# Patient Record
Sex: FEMALE | Race: WHITE | NOT HISPANIC OR LATINO | Employment: OTHER | URBAN - METROPOLITAN AREA
[De-identification: names, ages, dates, MRNs, and addresses within clinical notes are randomized per-mention and may not be internally consistent; named-entity substitution may affect disease eponyms.]

---

## 2017-01-03 ENCOUNTER — HISTORICAL (OUTPATIENT)
Dept: CARDIOLOGY | Facility: HOSPITAL | Age: 72
End: 2017-01-03

## 2017-01-17 ENCOUNTER — HISTORICAL (OUTPATIENT)
Dept: CARDIOLOGY | Facility: HOSPITAL | Age: 72
End: 2017-01-17

## 2017-01-27 ENCOUNTER — HISTORICAL (OUTPATIENT)
Dept: CARDIOLOGY | Facility: HOSPITAL | Age: 72
End: 2017-01-27

## 2017-01-30 ENCOUNTER — HISTORICAL (OUTPATIENT)
Dept: CARDIOLOGY | Facility: HOSPITAL | Age: 72
End: 2017-01-30

## 2017-02-08 ENCOUNTER — HISTORICAL (OUTPATIENT)
Dept: CARDIOLOGY | Facility: HOSPITAL | Age: 72
End: 2017-02-08

## 2017-02-14 ENCOUNTER — HISTORICAL (OUTPATIENT)
Dept: CARDIOLOGY | Facility: HOSPITAL | Age: 72
End: 2017-02-14

## 2017-02-21 ENCOUNTER — HISTORICAL (OUTPATIENT)
Dept: CARDIOLOGY | Facility: HOSPITAL | Age: 72
End: 2017-02-21

## 2017-03-08 ENCOUNTER — HISTORICAL (OUTPATIENT)
Dept: CARDIOLOGY | Facility: HOSPITAL | Age: 72
End: 2017-03-08

## 2017-03-21 ENCOUNTER — HISTORICAL (OUTPATIENT)
Dept: ADMINISTRATIVE | Facility: HOSPITAL | Age: 72
End: 2017-03-21

## 2017-04-05 ENCOUNTER — HISTORICAL (OUTPATIENT)
Dept: CARDIOLOGY | Facility: HOSPITAL | Age: 72
End: 2017-04-05

## 2017-05-08 ENCOUNTER — HISTORICAL (OUTPATIENT)
Dept: CARDIOLOGY | Facility: HOSPITAL | Age: 72
End: 2017-05-08

## 2017-05-11 ENCOUNTER — HISTORICAL (OUTPATIENT)
Dept: CARDIOLOGY | Facility: HOSPITAL | Age: 72
End: 2017-05-11

## 2017-05-17 ENCOUNTER — HISTORICAL (OUTPATIENT)
Dept: CARDIOLOGY | Facility: HOSPITAL | Age: 72
End: 2017-05-17

## 2017-06-14 ENCOUNTER — HISTORICAL (OUTPATIENT)
Dept: CARDIOLOGY | Facility: HOSPITAL | Age: 72
End: 2017-06-14

## 2017-06-23 ENCOUNTER — HISTORICAL (OUTPATIENT)
Dept: ADMINISTRATIVE | Facility: HOSPITAL | Age: 72
End: 2017-06-23

## 2017-06-23 LAB
FLUAV AG NPH QL IA: NEGATIVE
FLUBV AG NPH QL IA: NEGATIVE

## 2017-06-28 ENCOUNTER — HISTORICAL (OUTPATIENT)
Dept: CARDIOLOGY | Facility: HOSPITAL | Age: 72
End: 2017-06-28

## 2017-07-12 ENCOUNTER — HISTORICAL (OUTPATIENT)
Dept: CARDIOLOGY | Facility: HOSPITAL | Age: 72
End: 2017-07-12

## 2017-08-02 ENCOUNTER — HISTORICAL (OUTPATIENT)
Dept: CARDIOLOGY | Facility: HOSPITAL | Age: 72
End: 2017-08-02

## 2017-08-28 ENCOUNTER — HISTORICAL (OUTPATIENT)
Dept: CARDIOLOGY | Facility: HOSPITAL | Age: 72
End: 2017-08-28

## 2017-09-20 ENCOUNTER — HISTORICAL (OUTPATIENT)
Dept: ADMINISTRATIVE | Facility: HOSPITAL | Age: 72
End: 2017-09-20

## 2017-09-20 LAB
ABS NEUT (OLG): 3.56 X10(3)/MCL (ref 2.1–9.2)
ALBUMIN SERPL-MCNC: 3.9 GM/DL (ref 3.4–5)
ALBUMIN/GLOB SERPL: 1.3 {RATIO}
ALP SERPL-CCNC: 56 UNIT/L (ref 38–126)
ALT SERPL-CCNC: 24 UNIT/L (ref 12–78)
AST SERPL-CCNC: 17 UNIT/L (ref 15–37)
BASOPHILS # BLD AUTO: 0.1 X10(3)/MCL (ref 0–0.2)
BASOPHILS NFR BLD AUTO: 1 %
BILIRUB SERPL-MCNC: 0.7 MG/DL (ref 0.2–1)
BILIRUBIN DIRECT+TOT PNL SERPL-MCNC: 0.2 MG/DL (ref 0–0.2)
BILIRUBIN DIRECT+TOT PNL SERPL-MCNC: 0.5 MG/DL (ref 0–0.8)
BUN SERPL-MCNC: 19 MG/DL (ref 7–18)
CALCIUM SERPL-MCNC: 8.9 MG/DL (ref 8.5–10.1)
CHLORIDE SERPL-SCNC: 103 MMOL/L (ref 98–107)
CHOLEST SERPL-MCNC: 187 MG/DL (ref 0–200)
CHOLEST/HDLC SERPL: 2.8 {RATIO} (ref 0–4)
CO2 SERPL-SCNC: 28 MMOL/L (ref 21–32)
CREAT SERPL-MCNC: 0.7 MG/DL (ref 0.55–1.02)
EOSINOPHIL # BLD AUTO: 0.3 X10(3)/MCL (ref 0–0.9)
EOSINOPHIL NFR BLD AUTO: 5 %
ERYTHROCYTE [DISTWIDTH] IN BLOOD BY AUTOMATED COUNT: 13.1 % (ref 11.5–17)
GLOBULIN SER-MCNC: 3.1 GM/DL (ref 2.4–3.5)
GLUCOSE SERPL-MCNC: 96 MG/DL (ref 74–106)
HCT VFR BLD AUTO: 38.7 % (ref 37–47)
HDLC SERPL-MCNC: 67 MG/DL (ref 35–60)
HGB BLD-MCNC: 13.5 GM/DL (ref 12–16)
LDLC SERPL CALC-MCNC: 100 MG/DL (ref 0–129)
LYMPHOCYTES # BLD AUTO: 1.4 X10(3)/MCL (ref 0.6–4.6)
LYMPHOCYTES NFR BLD AUTO: 24 %
MCH RBC QN AUTO: 32.5 PG (ref 27–31)
MCHC RBC AUTO-ENTMCNC: 34.9 GM/DL (ref 33–36)
MCV RBC AUTO: 93 FL (ref 80–94)
MONOCYTES # BLD AUTO: 0.5 X10(3)/MCL (ref 0.1–1.3)
MONOCYTES NFR BLD AUTO: 9 %
NEUTROPHILS # BLD AUTO: 3.56 X10(3)/MCL (ref 1.4–7.9)
NEUTROPHILS NFR BLD AUTO: 60 %
PLATELET # BLD AUTO: 174 X10(3)/MCL (ref 130–400)
PMV BLD AUTO: 9.8 FL (ref 9.4–12.4)
POTASSIUM SERPL-SCNC: 4.3 MMOL/L (ref 3.5–5.1)
PROT SERPL-MCNC: 7 GM/DL (ref 6.4–8.2)
RBC # BLD AUTO: 4.16 X10(6)/MCL (ref 4.2–5.4)
SODIUM SERPL-SCNC: 138 MMOL/L (ref 136–145)
TRIGL SERPL-MCNC: 99 MG/DL (ref 30–150)
TSH SERPL-ACNC: 2.53 MIU/ML (ref 0.36–3.74)
VLDLC SERPL CALC-MCNC: 20 MG/DL
WBC # SPEC AUTO: 5.9 X10(3)/MCL (ref 4.5–11.5)

## 2017-09-26 ENCOUNTER — HISTORICAL (OUTPATIENT)
Dept: CARDIOLOGY | Facility: HOSPITAL | Age: 72
End: 2017-09-26

## 2017-09-29 ENCOUNTER — HISTORICAL (OUTPATIENT)
Dept: CARDIOLOGY | Facility: HOSPITAL | Age: 72
End: 2017-09-29

## 2017-10-05 ENCOUNTER — HISTORICAL (OUTPATIENT)
Dept: CARDIOLOGY | Facility: HOSPITAL | Age: 72
End: 2017-10-05

## 2017-10-18 ENCOUNTER — HISTORICAL (OUTPATIENT)
Dept: CARDIOLOGY | Facility: HOSPITAL | Age: 72
End: 2017-10-18

## 2017-11-15 ENCOUNTER — HISTORICAL (OUTPATIENT)
Dept: CARDIOLOGY | Facility: HOSPITAL | Age: 72
End: 2017-11-15

## 2017-11-29 ENCOUNTER — HISTORICAL (OUTPATIENT)
Dept: CARDIOLOGY | Facility: HOSPITAL | Age: 72
End: 2017-11-29

## 2017-12-14 ENCOUNTER — HISTORICAL (OUTPATIENT)
Dept: CARDIOLOGY | Facility: HOSPITAL | Age: 72
End: 2017-12-14

## 2017-12-19 ENCOUNTER — HISTORICAL (OUTPATIENT)
Dept: CARDIOLOGY | Facility: HOSPITAL | Age: 72
End: 2017-12-19

## 2018-01-10 ENCOUNTER — HISTORICAL (OUTPATIENT)
Dept: CARDIOLOGY | Facility: HOSPITAL | Age: 73
End: 2018-01-10

## 2018-01-30 ENCOUNTER — HISTORICAL (OUTPATIENT)
Dept: CARDIOLOGY | Facility: HOSPITAL | Age: 73
End: 2018-01-30

## 2018-02-06 ENCOUNTER — HISTORICAL (OUTPATIENT)
Dept: CARDIOLOGY | Facility: HOSPITAL | Age: 73
End: 2018-02-06

## 2018-02-15 ENCOUNTER — HISTORICAL (OUTPATIENT)
Dept: CARDIOLOGY | Facility: HOSPITAL | Age: 73
End: 2018-02-15

## 2018-02-19 ENCOUNTER — HISTORICAL (OUTPATIENT)
Dept: CARDIOLOGY | Facility: HOSPITAL | Age: 73
End: 2018-02-19

## 2018-02-21 ENCOUNTER — HISTORICAL (OUTPATIENT)
Dept: CARDIOLOGY | Facility: HOSPITAL | Age: 73
End: 2018-02-21

## 2018-02-27 ENCOUNTER — HISTORICAL (OUTPATIENT)
Dept: CARDIOLOGY | Facility: HOSPITAL | Age: 73
End: 2018-02-27

## 2018-03-15 ENCOUNTER — HISTORICAL (OUTPATIENT)
Dept: ADMINISTRATIVE | Facility: HOSPITAL | Age: 73
End: 2018-03-15

## 2018-03-15 LAB
ABS NEUT (OLG): 2.93 X10(3)/MCL (ref 2.1–9.2)
ALBUMIN SERPL-MCNC: 3.6 GM/DL (ref 3.4–5)
ALBUMIN/GLOB SERPL: 1.1 RATIO (ref 1.1–2)
ALP SERPL-CCNC: 64 UNIT/L (ref 38–126)
ALT SERPL-CCNC: 25 UNIT/L (ref 12–78)
AST SERPL-CCNC: 28 UNIT/L (ref 15–37)
BASOPHILS # BLD AUTO: 0.1 X10(3)/MCL (ref 0–0.2)
BASOPHILS NFR BLD AUTO: 2 %
BILIRUB SERPL-MCNC: 0.6 MG/DL (ref 0.2–1)
BILIRUBIN DIRECT+TOT PNL SERPL-MCNC: 0.1 MG/DL (ref 0–0.5)
BILIRUBIN DIRECT+TOT PNL SERPL-MCNC: 0.5 MG/DL (ref 0–0.8)
BUN SERPL-MCNC: 14 MG/DL (ref 7–18)
CALCIUM SERPL-MCNC: 9.4 MG/DL (ref 8.5–10.1)
CHLORIDE SERPL-SCNC: 103 MMOL/L (ref 98–107)
CHOLEST SERPL-MCNC: 205 MG/DL (ref 0–200)
CHOLEST/HDLC SERPL: 3.4 {RATIO} (ref 0–4)
CO2 SERPL-SCNC: 28 MMOL/L (ref 21–32)
CREAT SERPL-MCNC: 0.76 MG/DL (ref 0.55–1.02)
DEPRECATED CALCIDIOL+CALCIFEROL SERPL-MC: 50.93 NG/ML (ref 30–80)
EOSINOPHIL # BLD AUTO: 0.5 X10(3)/MCL (ref 0–0.9)
EOSINOPHIL NFR BLD AUTO: 9 %
ERYTHROCYTE [DISTWIDTH] IN BLOOD BY AUTOMATED COUNT: 12.6 % (ref 11.5–17)
GLOBULIN SER-MCNC: 3.2 GM/DL (ref 2.4–3.5)
GLUCOSE SERPL-MCNC: 108 MG/DL (ref 74–106)
HCT VFR BLD AUTO: 38 % (ref 37–47)
HDLC SERPL-MCNC: 61 MG/DL (ref 35–60)
HGB BLD-MCNC: 13.4 GM/DL (ref 12–16)
LDLC SERPL CALC-MCNC: 124 MG/DL (ref 0–129)
LYMPHOCYTES # BLD AUTO: 1.3 X10(3)/MCL (ref 0.6–4.6)
LYMPHOCYTES NFR BLD AUTO: 25 %
MCH RBC QN AUTO: 32.1 PG (ref 27–31)
MCHC RBC AUTO-ENTMCNC: 35.3 GM/DL (ref 33–36)
MCV RBC AUTO: 91.1 FL (ref 80–94)
MONOCYTES # BLD AUTO: 0.4 X10(3)/MCL (ref 0.1–1.3)
MONOCYTES NFR BLD AUTO: 8 %
NEUTROPHILS # BLD AUTO: 2.93 X10(3)/MCL (ref 2.1–9.2)
NEUTROPHILS NFR BLD AUTO: 56 %
PLATELET # BLD AUTO: 180 X10(3)/MCL (ref 130–400)
PMV BLD AUTO: 9.4 FL (ref 9.4–12.4)
POTASSIUM SERPL-SCNC: 4 MMOL/L (ref 3.5–5.1)
PROT SERPL-MCNC: 6.8 GM/DL (ref 6.4–8.2)
RBC # BLD AUTO: 4.17 X10(6)/MCL (ref 4.2–5.4)
SODIUM SERPL-SCNC: 138 MMOL/L (ref 136–145)
TRIGL SERPL-MCNC: 98 MG/DL (ref 30–150)
TSH SERPL-ACNC: 2.39 MIU/ML (ref 0.36–3.74)
VLDLC SERPL CALC-MCNC: 20 MG/DL
WBC # SPEC AUTO: 5.3 X10(3)/MCL (ref 4.5–11.5)

## 2018-03-20 ENCOUNTER — HISTORICAL (OUTPATIENT)
Dept: CARDIOLOGY | Facility: HOSPITAL | Age: 73
End: 2018-03-20

## 2018-04-16 ENCOUNTER — HISTORICAL (OUTPATIENT)
Dept: CARDIOLOGY | Facility: HOSPITAL | Age: 73
End: 2018-04-16

## 2018-05-14 ENCOUNTER — HISTORICAL (OUTPATIENT)
Dept: CARDIOLOGY | Facility: HOSPITAL | Age: 73
End: 2018-05-14

## 2018-06-14 ENCOUNTER — HISTORICAL (OUTPATIENT)
Dept: CARDIOLOGY | Facility: HOSPITAL | Age: 73
End: 2018-06-14

## 2018-07-12 ENCOUNTER — HISTORICAL (OUTPATIENT)
Dept: CARDIOLOGY | Facility: HOSPITAL | Age: 73
End: 2018-07-12

## 2018-08-09 ENCOUNTER — HISTORICAL (OUTPATIENT)
Dept: CARDIOLOGY | Facility: HOSPITAL | Age: 73
End: 2018-08-09

## 2018-08-22 ENCOUNTER — HISTORICAL (OUTPATIENT)
Dept: INTENSIVE CARE | Facility: HOSPITAL | Age: 73
End: 2018-08-22

## 2018-09-10 ENCOUNTER — HISTORICAL (OUTPATIENT)
Dept: CARDIOLOGY | Facility: HOSPITAL | Age: 73
End: 2018-09-10

## 2018-09-17 ENCOUNTER — HISTORICAL (OUTPATIENT)
Dept: ADMINISTRATIVE | Facility: HOSPITAL | Age: 73
End: 2018-09-17

## 2018-09-17 LAB
ABS NEUT (OLG): 3.23 X10(3)/MCL (ref 2.1–9.2)
ALBUMIN SERPL-MCNC: 3.3 GM/DL (ref 3.4–5)
ALBUMIN/GLOB SERPL: 1 RATIO (ref 1.1–2)
ALP SERPL-CCNC: 60 UNIT/L (ref 38–126)
ALT SERPL-CCNC: 25 UNIT/L (ref 12–78)
AST SERPL-CCNC: 19 UNIT/L (ref 15–37)
BASOPHILS # BLD AUTO: 0.1 X10(3)/MCL (ref 0–0.2)
BASOPHILS NFR BLD AUTO: 2 %
BILIRUB SERPL-MCNC: 0.5 MG/DL (ref 0.2–1)
BILIRUBIN DIRECT+TOT PNL SERPL-MCNC: 0.1 MG/DL (ref 0–0.5)
BILIRUBIN DIRECT+TOT PNL SERPL-MCNC: 0.4 MG/DL (ref 0–0.8)
BUN SERPL-MCNC: 15 MG/DL (ref 7–18)
CALCIUM SERPL-MCNC: 8.9 MG/DL (ref 8.5–10.1)
CHLORIDE SERPL-SCNC: 105 MMOL/L (ref 98–107)
CHOLEST SERPL-MCNC: 166 MG/DL (ref 0–200)
CHOLEST/HDLC SERPL: 3 {RATIO} (ref 0–4)
CO2 SERPL-SCNC: 29 MMOL/L (ref 21–32)
CREAT SERPL-MCNC: 0.74 MG/DL (ref 0.55–1.02)
EOSINOPHIL # BLD AUTO: 0.4 X10(3)/MCL (ref 0–0.9)
EOSINOPHIL NFR BLD AUTO: 6 %
ERYTHROCYTE [DISTWIDTH] IN BLOOD BY AUTOMATED COUNT: 12.8 % (ref 11.5–17)
GLOBULIN SER-MCNC: 3.4 GM/DL (ref 2.4–3.5)
GLUCOSE SERPL-MCNC: 100 MG/DL (ref 74–106)
HCT VFR BLD AUTO: 39.6 % (ref 37–47)
HDLC SERPL-MCNC: 56 MG/DL (ref 35–60)
HGB BLD-MCNC: 13.2 GM/DL (ref 12–16)
LDLC SERPL CALC-MCNC: 89 MG/DL (ref 0–129)
LYMPHOCYTES # BLD AUTO: 1.4 X10(3)/MCL (ref 0.6–4.6)
LYMPHOCYTES NFR BLD AUTO: 24 %
MCH RBC QN AUTO: 31.9 PG (ref 27–31)
MCHC RBC AUTO-ENTMCNC: 33.3 GM/DL (ref 33–36)
MCV RBC AUTO: 95.7 FL (ref 80–94)
MONOCYTES # BLD AUTO: 0.5 X10(3)/MCL (ref 0.1–1.3)
MONOCYTES NFR BLD AUTO: 9 %
NEUTROPHILS # BLD AUTO: 3.23 X10(3)/MCL (ref 1.4–7.9)
NEUTROPHILS NFR BLD AUTO: 58 %
PLATELET # BLD AUTO: 165 X10(3)/MCL (ref 130–400)
PMV BLD AUTO: 9.9 FL (ref 9.4–12.4)
POTASSIUM SERPL-SCNC: 3.8 MMOL/L (ref 3.5–5.1)
PROT SERPL-MCNC: 6.7 GM/DL (ref 6.4–8.2)
RBC # BLD AUTO: 4.14 X10(6)/MCL (ref 4.2–5.4)
SODIUM SERPL-SCNC: 140 MMOL/L (ref 136–145)
TRIGL SERPL-MCNC: 103 MG/DL (ref 30–150)
TSH SERPL-ACNC: 1.53 MIU/L (ref 0.36–3.74)
VLDLC SERPL CALC-MCNC: 21 MG/DL
WBC # SPEC AUTO: 5.5 X10(3)/MCL (ref 4.5–11.5)

## 2018-10-09 ENCOUNTER — HISTORICAL (OUTPATIENT)
Dept: CARDIOLOGY | Facility: HOSPITAL | Age: 73
End: 2018-10-09

## 2018-10-31 ENCOUNTER — HISTORICAL (OUTPATIENT)
Dept: CARDIOLOGY | Facility: HOSPITAL | Age: 73
End: 2018-10-31

## 2018-11-06 ENCOUNTER — HISTORICAL (OUTPATIENT)
Dept: CARDIOLOGY | Facility: HOSPITAL | Age: 73
End: 2018-11-06

## 2018-11-06 LAB
INR PPP: 2.31 (ref 0–1.27)
PROTHROMBIN TIME: 26.1 SECOND(S) (ref 12.2–14.7)

## 2018-11-28 ENCOUNTER — HISTORICAL (OUTPATIENT)
Dept: CARDIOLOGY | Facility: HOSPITAL | Age: 73
End: 2018-11-28

## 2018-12-10 ENCOUNTER — HISTORICAL (OUTPATIENT)
Dept: CARDIOLOGY | Facility: HOSPITAL | Age: 73
End: 2018-12-10

## 2018-12-12 ENCOUNTER — HISTORICAL (OUTPATIENT)
Dept: CARDIOLOGY | Facility: HOSPITAL | Age: 73
End: 2018-12-12

## 2019-01-02 ENCOUNTER — HISTORICAL (OUTPATIENT)
Dept: CARDIOLOGY | Facility: HOSPITAL | Age: 74
End: 2019-01-02

## 2019-01-03 ENCOUNTER — HISTORICAL (OUTPATIENT)
Dept: LAB | Facility: HOSPITAL | Age: 74
End: 2019-01-03

## 2019-01-04 LAB
GRAM STN SPEC: NORMAL
GRAM STN SPEC: NORMAL

## 2019-01-06 LAB — FINAL CULTURE: NORMAL

## 2019-01-23 ENCOUNTER — HISTORICAL (OUTPATIENT)
Dept: CARDIOLOGY | Facility: HOSPITAL | Age: 74
End: 2019-01-23

## 2019-02-04 LAB
FINAL CULTURE: NORMAL
FINAL CULTURE: NORMAL

## 2019-02-05 ENCOUNTER — HISTORICAL (OUTPATIENT)
Dept: CARDIOLOGY | Facility: HOSPITAL | Age: 74
End: 2019-02-05

## 2019-02-19 ENCOUNTER — HISTORICAL (OUTPATIENT)
Dept: CARDIOLOGY | Facility: HOSPITAL | Age: 74
End: 2019-02-19

## 2019-03-07 ENCOUNTER — HISTORICAL (OUTPATIENT)
Dept: CARDIOLOGY | Facility: HOSPITAL | Age: 74
End: 2019-03-07

## 2019-03-18 ENCOUNTER — HISTORICAL (OUTPATIENT)
Dept: ADMINISTRATIVE | Facility: HOSPITAL | Age: 74
End: 2019-03-18

## 2019-03-18 LAB
ABS NEUT (OLG): 3.4 X10(3)/MCL (ref 2.1–9.2)
ALBUMIN SERPL-MCNC: 3.4 GM/DL (ref 3.4–5)
ALBUMIN/GLOB SERPL: 1.1 RATIO (ref 1.1–2)
ALP SERPL-CCNC: 61 UNIT/L (ref 38–126)
ALT SERPL-CCNC: 24 UNIT/L (ref 12–78)
AST SERPL-CCNC: 22 UNIT/L (ref 15–37)
BASOPHILS # BLD AUTO: 0.1 X10(3)/MCL (ref 0–0.2)
BASOPHILS NFR BLD AUTO: 2 %
BILIRUB SERPL-MCNC: 0.5 MG/DL (ref 0.2–1)
BILIRUBIN DIRECT+TOT PNL SERPL-MCNC: 0.1 MG/DL (ref 0–0.5)
BILIRUBIN DIRECT+TOT PNL SERPL-MCNC: 0.4 MG/DL (ref 0–0.8)
BUN SERPL-MCNC: 21 MG/DL (ref 7–18)
CALCIUM SERPL-MCNC: 8.8 MG/DL (ref 8.5–10.1)
CHLORIDE SERPL-SCNC: 109 MMOL/L (ref 98–107)
CHOLEST SERPL-MCNC: 213 MG/DL (ref 0–200)
CHOLEST/HDLC SERPL: 3.1 {RATIO} (ref 0–4)
CO2 SERPL-SCNC: 32 MMOL/L (ref 21–32)
CREAT SERPL-MCNC: 0.85 MG/DL (ref 0.55–1.02)
DEPRECATED CALCIDIOL+CALCIFEROL SERPL-MC: 37.65 NG/ML (ref 30–80)
EOSINOPHIL # BLD AUTO: 0.4 X10(3)/MCL (ref 0–0.9)
EOSINOPHIL NFR BLD AUTO: 6 %
ERYTHROCYTE [DISTWIDTH] IN BLOOD BY AUTOMATED COUNT: 13 % (ref 11.5–17)
FT4I SERPL CALC-MCNC: 2.84 (ref 2.6–3.6)
GLOBULIN SER-MCNC: 3.2 GM/DL (ref 2.4–3.5)
GLUCOSE SERPL-MCNC: 102 MG/DL (ref 74–106)
HCT VFR BLD AUTO: 41.2 % (ref 37–47)
HDLC SERPL-MCNC: 68 MG/DL (ref 35–60)
HGB BLD-MCNC: 13.3 GM/DL (ref 12–16)
LDLC SERPL CALC-MCNC: 122 MG/DL (ref 0–129)
LYMPHOCYTES # BLD AUTO: 1.5 X10(3)/MCL (ref 0.6–4.6)
LYMPHOCYTES NFR BLD AUTO: 25 %
MCH RBC QN AUTO: 31.1 PG (ref 27–31)
MCHC RBC AUTO-ENTMCNC: 32.3 GM/DL (ref 33–36)
MCV RBC AUTO: 96.3 FL (ref 80–94)
MONOCYTES # BLD AUTO: 0.5 X10(3)/MCL (ref 0.1–1.3)
MONOCYTES NFR BLD AUTO: 8 %
NEUTROPHILS # BLD AUTO: 3.4 X10(3)/MCL (ref 2.1–9.2)
NEUTROPHILS NFR BLD AUTO: 58 %
PLATELET # BLD AUTO: 155 X10(3)/MCL (ref 130–400)
PMV BLD AUTO: 9.6 FL (ref 9.4–12.4)
POTASSIUM SERPL-SCNC: 4.5 MMOL/L (ref 3.5–5.1)
PROT SERPL-MCNC: 6.6 GM/DL (ref 6.4–8.2)
RBC # BLD AUTO: 4.28 X10(6)/MCL (ref 4.2–5.4)
SODIUM SERPL-SCNC: 143 MMOL/L (ref 136–145)
T3RU NFR SERPL: 35 % (ref 31–39)
T4 SERPL-MCNC: 8.1 MCG/DL (ref 4.7–13.3)
TRIGL SERPL-MCNC: 117 MG/DL (ref 30–150)
TSH SERPL-ACNC: 2.7 MIU/L (ref 0.36–3.74)
VLDLC SERPL CALC-MCNC: 23 MG/DL
WBC # SPEC AUTO: 5.8 X10(3)/MCL (ref 4.5–11.5)

## 2019-03-26 ENCOUNTER — HISTORICAL (OUTPATIENT)
Dept: CARDIOLOGY | Facility: HOSPITAL | Age: 74
End: 2019-03-26

## 2019-04-23 ENCOUNTER — HISTORICAL (OUTPATIENT)
Dept: CARDIOLOGY | Facility: HOSPITAL | Age: 74
End: 2019-04-23

## 2019-05-20 ENCOUNTER — HISTORICAL (OUTPATIENT)
Dept: CARDIOLOGY | Facility: HOSPITAL | Age: 74
End: 2019-05-20

## 2019-05-24 ENCOUNTER — HISTORICAL (OUTPATIENT)
Dept: CARDIOLOGY | Facility: HOSPITAL | Age: 74
End: 2019-05-24

## 2019-06-06 ENCOUNTER — HISTORICAL (OUTPATIENT)
Dept: CARDIOLOGY | Facility: HOSPITAL | Age: 74
End: 2019-06-06

## 2019-06-13 ENCOUNTER — HISTORICAL (OUTPATIENT)
Dept: CARDIOLOGY | Facility: HOSPITAL | Age: 74
End: 2019-06-13

## 2019-07-15 ENCOUNTER — HISTORICAL (OUTPATIENT)
Dept: CARDIOLOGY | Facility: HOSPITAL | Age: 74
End: 2019-07-15

## 2019-08-01 ENCOUNTER — HISTORICAL (OUTPATIENT)
Dept: CARDIOLOGY | Facility: HOSPITAL | Age: 74
End: 2019-08-01

## 2019-08-23 ENCOUNTER — HISTORICAL (OUTPATIENT)
Dept: CARDIOLOGY | Facility: HOSPITAL | Age: 74
End: 2019-08-23

## 2019-08-27 ENCOUNTER — HISTORICAL (OUTPATIENT)
Dept: CARDIOLOGY | Facility: HOSPITAL | Age: 74
End: 2019-08-27

## 2019-09-05 ENCOUNTER — HISTORICAL (OUTPATIENT)
Dept: CARDIOLOGY | Facility: HOSPITAL | Age: 74
End: 2019-09-05

## 2019-09-16 ENCOUNTER — HISTORICAL (OUTPATIENT)
Dept: ADMINISTRATIVE | Facility: HOSPITAL | Age: 74
End: 2019-09-16

## 2019-09-16 LAB
ABS NEUT (OLG): 3.03 X10(3)/MCL (ref 2.1–9.2)
ALBUMIN SERPL-MCNC: 3.6 GM/DL (ref 3.4–5)
ALBUMIN/GLOB SERPL: 1.1 RATIO (ref 1.1–2)
ALP SERPL-CCNC: 63 UNIT/L (ref 38–126)
ALT SERPL-CCNC: 26 UNIT/L (ref 12–78)
AST SERPL-CCNC: 23 UNIT/L (ref 15–37)
BASOPHILS # BLD AUTO: 0.1 X10(3)/MCL (ref 0–0.2)
BASOPHILS NFR BLD AUTO: 2 %
BILIRUB SERPL-MCNC: 0.6 MG/DL (ref 0.2–1)
BILIRUBIN DIRECT+TOT PNL SERPL-MCNC: 0.1 MG/DL (ref 0–0.5)
BILIRUBIN DIRECT+TOT PNL SERPL-MCNC: 0.5 MG/DL (ref 0–0.8)
BUN SERPL-MCNC: 15 MG/DL (ref 7–18)
CALCIUM SERPL-MCNC: 8.9 MG/DL (ref 8.5–10.1)
CHLORIDE SERPL-SCNC: 107 MMOL/L (ref 98–107)
CHOLEST SERPL-MCNC: 226 MG/DL (ref 0–200)
CHOLEST/HDLC SERPL: 3.5 {RATIO} (ref 0–4)
CO2 SERPL-SCNC: 26 MMOL/L (ref 21–32)
CREAT SERPL-MCNC: 0.73 MG/DL (ref 0.55–1.02)
EOSINOPHIL # BLD AUTO: 0.5 X10(3)/MCL (ref 0–0.9)
EOSINOPHIL NFR BLD AUTO: 9 %
ERYTHROCYTE [DISTWIDTH] IN BLOOD BY AUTOMATED COUNT: 13.1 % (ref 11.5–17)
GLOBULIN SER-MCNC: 3.2 GM/DL (ref 2.4–3.5)
GLUCOSE SERPL-MCNC: 95 MG/DL (ref 74–106)
HCT VFR BLD AUTO: 43.9 % (ref 37–47)
HDLC SERPL-MCNC: 65 MG/DL (ref 35–60)
HGB BLD-MCNC: 14.7 GM/DL (ref 12–16)
LDLC SERPL CALC-MCNC: 143 MG/DL (ref 0–129)
LYMPHOCYTES # BLD AUTO: 1.3 X10(3)/MCL (ref 0.6–4.6)
LYMPHOCYTES NFR BLD AUTO: 24 %
MCH RBC QN AUTO: 32 PG (ref 27–31)
MCHC RBC AUTO-ENTMCNC: 33.5 GM/DL (ref 33–36)
MCV RBC AUTO: 95.6 FL (ref 80–94)
MONOCYTES # BLD AUTO: 0.5 X10(3)/MCL (ref 0.1–1.3)
MONOCYTES NFR BLD AUTO: 9 %
NEUTROPHILS # BLD AUTO: 3.03 X10(3)/MCL (ref 2.1–9.2)
NEUTROPHILS NFR BLD AUTO: 56 %
PLATELET # BLD AUTO: 159 X10(3)/MCL (ref 130–400)
PMV BLD AUTO: 9.8 FL (ref 9.4–12.4)
POTASSIUM SERPL-SCNC: 4.2 MMOL/L (ref 3.5–5.1)
PROT SERPL-MCNC: 6.8 GM/DL (ref 6.4–8.2)
RBC # BLD AUTO: 4.59 X10(6)/MCL (ref 4.2–5.4)
SODIUM SERPL-SCNC: 142 MMOL/L (ref 136–145)
TRIGL SERPL-MCNC: 90 MG/DL (ref 30–150)
TSH SERPL-ACNC: 3.55 MIU/L (ref 0.36–3.74)
VLDLC SERPL CALC-MCNC: 18 MG/DL
WBC # SPEC AUTO: 5.4 X10(3)/MCL (ref 4.5–11.5)

## 2019-09-19 ENCOUNTER — HISTORICAL (OUTPATIENT)
Dept: CARDIOLOGY | Facility: HOSPITAL | Age: 74
End: 2019-09-19

## 2019-10-17 ENCOUNTER — HISTORICAL (OUTPATIENT)
Dept: CARDIOLOGY | Facility: HOSPITAL | Age: 74
End: 2019-10-17

## 2019-10-28 ENCOUNTER — HISTORICAL (OUTPATIENT)
Dept: ADMINISTRATIVE | Facility: HOSPITAL | Age: 74
End: 2019-10-28

## 2019-11-07 ENCOUNTER — HISTORICAL (OUTPATIENT)
Dept: CARDIOLOGY | Facility: HOSPITAL | Age: 74
End: 2019-11-07

## 2019-11-20 ENCOUNTER — HISTORICAL (OUTPATIENT)
Dept: CARDIOLOGY | Facility: HOSPITAL | Age: 74
End: 2019-11-20

## 2019-12-09 ENCOUNTER — HISTORICAL (OUTPATIENT)
Dept: LAB | Facility: HOSPITAL | Age: 74
End: 2019-12-09

## 2019-12-09 LAB
ABS NEUT (OLG): 4.19 X10(3)/MCL (ref 2.1–9.2)
ALBUMIN SERPL-MCNC: 3.8 GM/DL (ref 3.4–5)
ALBUMIN/GLOB SERPL: 1.2 {RATIO}
ALP SERPL-CCNC: 79 UNIT/L (ref 38–126)
ALT SERPL-CCNC: 24 UNIT/L (ref 12–78)
AST SERPL-CCNC: 21 UNIT/L (ref 15–37)
BASOPHILS # BLD AUTO: 0.1 X10(3)/MCL (ref 0–0.2)
BASOPHILS NFR BLD AUTO: 1 %
BILIRUB SERPL-MCNC: 0.5 MG/DL (ref 0.2–1)
BILIRUBIN DIRECT+TOT PNL SERPL-MCNC: 0.1 MG/DL (ref 0–0.2)
BILIRUBIN DIRECT+TOT PNL SERPL-MCNC: 0.4 MG/DL (ref 0–0.8)
BUN SERPL-MCNC: 20 MG/DL (ref 7–18)
CALCIUM SERPL-MCNC: 9.7 MG/DL (ref 8.5–10.1)
CHLORIDE SERPL-SCNC: 106 MMOL/L (ref 98–107)
CO2 SERPL-SCNC: 28 MMOL/L (ref 21–32)
CREAT SERPL-MCNC: 0.79 MG/DL (ref 0.55–1.02)
EOSINOPHIL # BLD AUTO: 0.6 X10(3)/MCL (ref 0–0.9)
EOSINOPHIL NFR BLD AUTO: 8 %
ERYTHROCYTE [DISTWIDTH] IN BLOOD BY AUTOMATED COUNT: 13.2 % (ref 11.5–17)
ERYTHROCYTE [SEDIMENTATION RATE] IN BLOOD: 14 MM/HR (ref 0–20)
GLOBULIN SER-MCNC: 3.3 GM/DL (ref 2.4–3.5)
GLUCOSE SERPL-MCNC: 91 MG/DL (ref 74–106)
HCT VFR BLD AUTO: 42.1 % (ref 37–47)
HGB BLD-MCNC: 14.1 GM/DL (ref 12–16)
LYMPHOCYTES # BLD AUTO: 1.6 X10(3)/MCL (ref 0.6–4.6)
LYMPHOCYTES NFR BLD AUTO: 23 %
MCH RBC QN AUTO: 32.7 PG (ref 27–31)
MCHC RBC AUTO-ENTMCNC: 33.5 GM/DL (ref 33–36)
MCV RBC AUTO: 97.7 FL (ref 80–94)
MONOCYTES # BLD AUTO: 0.6 X10(3)/MCL (ref 0.1–1.3)
MONOCYTES NFR BLD AUTO: 8 %
NEUTROPHILS # BLD AUTO: 4.19 X10(3)/MCL (ref 2.1–9.2)
NEUTROPHILS NFR BLD AUTO: 59 %
PLATELET # BLD AUTO: 171 X10(3)/MCL (ref 130–400)
PMV BLD AUTO: 10.1 FL (ref 9.4–12.4)
POTASSIUM SERPL-SCNC: 4.3 MMOL/L (ref 3.5–5.1)
PROT SERPL-MCNC: 7.1 GM/DL (ref 6.4–8.2)
RBC # BLD AUTO: 4.31 X10(6)/MCL (ref 4.2–5.4)
SODIUM SERPL-SCNC: 139 MMOL/L (ref 136–145)
WBC # SPEC AUTO: 7 X10(3)/MCL (ref 4.5–11.5)

## 2019-12-11 ENCOUNTER — HISTORICAL (OUTPATIENT)
Dept: CARDIOLOGY | Facility: HOSPITAL | Age: 74
End: 2019-12-11

## 2020-01-08 ENCOUNTER — HISTORICAL (OUTPATIENT)
Dept: CARDIOLOGY | Facility: HOSPITAL | Age: 75
End: 2020-01-08

## 2020-01-24 ENCOUNTER — HISTORICAL (OUTPATIENT)
Dept: ADMINISTRATIVE | Facility: HOSPITAL | Age: 75
End: 2020-01-24

## 2020-01-24 LAB
ABS NEUT (OLG): 4.86 X10(3)/MCL (ref 2.1–9.2)
BASOPHILS # BLD AUTO: 0.1 X10(3)/MCL (ref 0–0.2)
BASOPHILS NFR BLD AUTO: 1 %
BNP BLD-MCNC: 60 PG/ML (ref 0–125)
BUN SERPL-MCNC: 15 MG/DL (ref 7–18)
CALCIUM SERPL-MCNC: 9.2 MG/DL (ref 8.5–10.1)
CHLORIDE SERPL-SCNC: 98 MMOL/L (ref 98–107)
CO2 SERPL-SCNC: 28 MMOL/L (ref 21–32)
CREAT SERPL-MCNC: 0.85 MG/DL (ref 0.55–1.02)
CREAT/UREA NIT SERPL: 17.6
EOSINOPHIL # BLD AUTO: 0.5 X10(3)/MCL (ref 0–0.9)
EOSINOPHIL NFR BLD AUTO: 7 %
ERYTHROCYTE [DISTWIDTH] IN BLOOD BY AUTOMATED COUNT: 12.5 % (ref 11.5–17)
GLUCOSE SERPL-MCNC: 102 MG/DL (ref 74–106)
HCT VFR BLD AUTO: 39.1 % (ref 37–47)
HGB BLD-MCNC: 13.4 GM/DL (ref 12–16)
LYMPHOCYTES # BLD AUTO: 1.4 X10(3)/MCL (ref 0.6–4.6)
LYMPHOCYTES NFR BLD AUTO: 18 %
MCH RBC QN AUTO: 32.2 PG (ref 27–31)
MCHC RBC AUTO-ENTMCNC: 34.3 GM/DL (ref 33–36)
MCV RBC AUTO: 94 FL (ref 80–94)
MONOCYTES # BLD AUTO: 0.7 X10(3)/MCL (ref 0.1–1.3)
MONOCYTES NFR BLD AUTO: 9 %
NEUTROPHILS # BLD AUTO: 4.86 X10(3)/MCL (ref 2.1–9.2)
NEUTROPHILS NFR BLD AUTO: 64 %
PLATELET # BLD AUTO: 199 X10(3)/MCL (ref 130–400)
PMV BLD AUTO: 9.8 FL (ref 9.4–12.4)
POTASSIUM SERPL-SCNC: 4.1 MMOL/L (ref 3.5–5.1)
RBC # BLD AUTO: 4.16 X10(6)/MCL (ref 4.2–5.4)
SODIUM SERPL-SCNC: 134 MMOL/L (ref 136–145)
WBC # SPEC AUTO: 7.6 X10(3)/MCL (ref 4.5–11.5)

## 2020-01-30 ENCOUNTER — HISTORICAL (OUTPATIENT)
Dept: CARDIOLOGY | Facility: HOSPITAL | Age: 75
End: 2020-01-30

## 2020-02-06 ENCOUNTER — HISTORICAL (OUTPATIENT)
Dept: RADIOLOGY | Facility: HOSPITAL | Age: 75
End: 2020-02-06

## 2020-02-12 ENCOUNTER — HISTORICAL (OUTPATIENT)
Dept: CARDIOLOGY | Facility: HOSPITAL | Age: 75
End: 2020-02-12

## 2020-02-13 ENCOUNTER — HISTORICAL (OUTPATIENT)
Dept: ADMINISTRATIVE | Facility: HOSPITAL | Age: 75
End: 2020-02-13

## 2020-02-13 LAB
PHOSPHOLIPID AB COM-LC: NORMAL
PHOSPHOLIPID AB INT-LC: NEGATIVE
RHEUMATOID FACT SERPL-ACNC: <10 IU/ML (ref 0–15)

## 2020-02-20 ENCOUNTER — HISTORICAL (OUTPATIENT)
Dept: CARDIOLOGY | Facility: HOSPITAL | Age: 75
End: 2020-02-20

## 2020-02-24 ENCOUNTER — HISTORICAL (OUTPATIENT)
Dept: RESPIRATORY THERAPY | Facility: HOSPITAL | Age: 75
End: 2020-02-24

## 2020-03-03 ENCOUNTER — HISTORICAL (OUTPATIENT)
Dept: ADMINISTRATIVE | Facility: HOSPITAL | Age: 75
End: 2020-03-03

## 2020-03-03 LAB
ABS NEUT (OLG): 3.47 X10(3)/MCL (ref 2.1–9.2)
ALBUMIN SERPL-MCNC: 3.6 GM/DL (ref 3.4–5)
ALBUMIN/GLOB SERPL: 1.1 {RATIO}
ALP SERPL-CCNC: 74 UNIT/L (ref 38–126)
ALT SERPL-CCNC: 23 UNIT/L (ref 12–78)
AST SERPL-CCNC: 22 UNIT/L (ref 15–37)
BASOPHILS # BLD AUTO: 0.1 X10(3)/MCL (ref 0–0.2)
BASOPHILS NFR BLD AUTO: 2 %
BILIRUB SERPL-MCNC: 0.9 MG/DL (ref 0.2–1)
BILIRUBIN DIRECT+TOT PNL SERPL-MCNC: 0.1 MG/DL (ref 0–0.2)
BILIRUBIN DIRECT+TOT PNL SERPL-MCNC: 0.8 MG/DL (ref 0–0.8)
BUN SERPL-MCNC: 13 MG/DL (ref 7–18)
CALCIUM SERPL-MCNC: 9.1 MG/DL (ref 8.5–10.1)
CHLORIDE SERPL-SCNC: 101 MMOL/L (ref 98–107)
CHOLEST SERPL-MCNC: 206 MG/DL (ref 0–200)
CHOLEST/HDLC SERPL: 3 {RATIO} (ref 0–4)
CO2 SERPL-SCNC: 30 MMOL/L (ref 21–32)
CREAT SERPL-MCNC: 0.82 MG/DL (ref 0.55–1.02)
DEPRECATED CALCIDIOL+CALCIFEROL SERPL-MC: 51.13 NG/ML (ref 30–80)
EOSINOPHIL # BLD AUTO: 0.6 X10(3)/MCL (ref 0–0.9)
EOSINOPHIL NFR BLD AUTO: 9 %
ERYTHROCYTE [DISTWIDTH] IN BLOOD BY AUTOMATED COUNT: 12.6 % (ref 11.5–17)
GLOBULIN SER-MCNC: 3.3 GM/DL (ref 2.4–3.5)
GLUCOSE SERPL-MCNC: 105 MG/DL (ref 74–106)
HCT VFR BLD AUTO: 39.6 % (ref 37–47)
HDLC SERPL-MCNC: 68 MG/DL (ref 35–60)
HGB BLD-MCNC: 13.2 GM/DL (ref 12–16)
LDLC SERPL CALC-MCNC: 112 MG/DL (ref 0–129)
LYMPHOCYTES # BLD AUTO: 1.4 X10(3)/MCL (ref 0.6–4.6)
LYMPHOCYTES NFR BLD AUTO: 24 %
MCH RBC QN AUTO: 32 PG (ref 27–31)
MCHC RBC AUTO-ENTMCNC: 33.3 GM/DL (ref 33–36)
MCV RBC AUTO: 95.9 FL (ref 80–94)
MONOCYTES # BLD AUTO: 0.5 X10(3)/MCL (ref 0.1–1.3)
MONOCYTES NFR BLD AUTO: 8 %
NEUTROPHILS # BLD AUTO: 3.47 X10(3)/MCL (ref 2.1–9.2)
NEUTROPHILS NFR BLD AUTO: 57 %
PLATELET # BLD AUTO: 184 X10(3)/MCL (ref 130–400)
PMV BLD AUTO: 9.3 FL (ref 9.4–12.4)
POTASSIUM SERPL-SCNC: 3.7 MMOL/L (ref 3.5–5.1)
PROT SERPL-MCNC: 6.9 GM/DL (ref 6.4–8.2)
RBC # BLD AUTO: 4.13 X10(6)/MCL (ref 4.2–5.4)
SODIUM SERPL-SCNC: 138 MMOL/L (ref 136–145)
TRIGL SERPL-MCNC: 129 MG/DL (ref 30–150)
TSH SERPL-ACNC: 2.55 MIU/L (ref 0.36–3.74)
VLDLC SERPL CALC-MCNC: 26 MG/DL
WBC # SPEC AUTO: 6.1 X10(3)/MCL (ref 4.5–11.5)

## 2020-03-05 ENCOUNTER — HISTORICAL (OUTPATIENT)
Dept: ADMINISTRATIVE | Facility: HOSPITAL | Age: 75
End: 2020-03-05

## 2020-03-05 LAB
APPEARANCE, UA: CLEAR
BACTERIA SPEC CULT: ABNORMAL /HPF
BILIRUB UR QL STRIP: NEGATIVE
COLOR UR: YELLOW
GLUCOSE (UA): NEGATIVE
HGB UR QL STRIP: NEGATIVE
KETONES UR QL STRIP: NEGATIVE
LEUKOCYTE ESTERASE UR QL STRIP: NEGATIVE
NITRITE UR QL STRIP: NEGATIVE
PH UR STRIP: 6 [PH] (ref 5–9)
PROT UR QL STRIP: NEGATIVE
RBC #/AREA URNS HPF: ABNORMAL /[HPF]
SP GR UR STRIP: 1.02 (ref 1–1.03)
SQUAMOUS EPITHELIAL, UA: ABNORMAL
UA WBC MAN: ABNORMAL /HPF
UROBILINOGEN UR STRIP-ACNC: 0.2

## 2020-03-30 ENCOUNTER — HISTORICAL (OUTPATIENT)
Dept: CARDIOLOGY | Facility: HOSPITAL | Age: 75
End: 2020-03-30

## 2020-04-13 ENCOUNTER — HISTORICAL (OUTPATIENT)
Dept: ADMINISTRATIVE | Facility: HOSPITAL | Age: 75
End: 2020-04-13

## 2020-04-13 LAB
ABS NEUT (OLG): 4.12 X10(3)/MCL (ref 2.1–9.2)
BASOPHILS # BLD AUTO: 0.1 X10(3)/MCL (ref 0–0.2)
BASOPHILS NFR BLD AUTO: 1 %
CRP SERPL HS-MCNC: 2.47 MG/L
EOSINOPHIL # BLD AUTO: 1 X10(3)/MCL (ref 0–0.9)
EOSINOPHIL NFR BLD AUTO: 14 %
ERYTHROCYTE [DISTWIDTH] IN BLOOD BY AUTOMATED COUNT: 12.5 % (ref 11.5–17)
ERYTHROCYTE [SEDIMENTATION RATE] IN BLOOD: 15 MM/HR (ref 0–20)
HCT VFR BLD AUTO: 42.2 % (ref 37–47)
HGB BLD-MCNC: 14 GM/DL (ref 12–16)
LYMPHOCYTES # BLD AUTO: 1.6 X10(3)/MCL (ref 0.6–4.6)
LYMPHOCYTES NFR BLD AUTO: 22 %
MCH RBC QN AUTO: 31.6 PG (ref 27–31)
MCHC RBC AUTO-ENTMCNC: 33.2 GM/DL (ref 33–36)
MCV RBC AUTO: 95.3 FL (ref 80–94)
MONOCYTES # BLD AUTO: 0.6 X10(3)/MCL (ref 0.1–1.3)
MONOCYTES NFR BLD AUTO: 7 %
NEUTROPHILS # BLD AUTO: 4.12 X10(3)/MCL (ref 2.1–9.2)
NEUTROPHILS NFR BLD AUTO: 56 %
PLATELET # BLD AUTO: 191 X10(3)/MCL (ref 130–400)
PMV BLD AUTO: 9.4 FL (ref 9.4–12.4)
RBC # BLD AUTO: 4.43 X10(6)/MCL (ref 4.2–5.4)
WBC # SPEC AUTO: 7.4 X10(3)/MCL (ref 4.5–11.5)

## 2020-04-20 ENCOUNTER — HISTORICAL (OUTPATIENT)
Dept: CARDIOLOGY | Facility: HOSPITAL | Age: 75
End: 2020-04-20

## 2020-05-18 ENCOUNTER — HISTORICAL (OUTPATIENT)
Dept: CARDIOLOGY | Facility: HOSPITAL | Age: 75
End: 2020-05-18

## 2020-06-18 ENCOUNTER — HISTORICAL (OUTPATIENT)
Dept: ADMINISTRATIVE | Facility: HOSPITAL | Age: 75
End: 2020-06-18

## 2020-06-18 LAB
ABS NEUT (OLG): 3.78 X10(3)/MCL (ref 2.1–9.2)
ALBUMIN SERPL-MCNC: 4 GM/DL (ref 3.4–5)
ALBUMIN/GLOB SERPL: 1.3 RATIO (ref 1.1–2)
ALP SERPL-CCNC: 71 UNIT/L (ref 40–150)
ALT SERPL-CCNC: 22 UNIT/L (ref 0–55)
AST SERPL-CCNC: 28 UNIT/L (ref 5–34)
BASOPHILS # BLD AUTO: 0.1 X10(3)/MCL (ref 0–0.2)
BASOPHILS NFR BLD AUTO: 2 %
BILIRUB SERPL-MCNC: 0.4 MG/DL
BILIRUBIN DIRECT+TOT PNL SERPL-MCNC: 0.2 MG/DL (ref 0–0.5)
BILIRUBIN DIRECT+TOT PNL SERPL-MCNC: 0.2 MG/DL (ref 0–0.8)
BUN SERPL-MCNC: 14.7 MG/DL (ref 9.8–20.1)
CALCIUM SERPL-MCNC: 9.4 MG/DL (ref 8.4–10.2)
CHLORIDE SERPL-SCNC: 101 MMOL/L (ref 98–107)
CK SERPL-CCNC: 147 U/L (ref 29–168)
CO2 SERPL-SCNC: 31 MMOL/L (ref 23–31)
CREAT SERPL-MCNC: 0.86 MG/DL (ref 0.55–1.02)
CRP SERPL-MCNC: <0.3 MG/DL
EOSINOPHIL # BLD AUTO: 0.4 X10(3)/MCL (ref 0–0.9)
EOSINOPHIL NFR BLD AUTO: 7 %
ERYTHROCYTE [DISTWIDTH] IN BLOOD BY AUTOMATED COUNT: 13.2 % (ref 11.5–17)
ERYTHROCYTE [SEDIMENTATION RATE] IN BLOOD: 18 MM/HR (ref 0–20)
GLOBULIN SER-MCNC: 3 GM/DL (ref 2.4–3.5)
GLUCOSE SERPL-MCNC: 101 MG/DL (ref 82–115)
HCT VFR BLD AUTO: 39.1 % (ref 37–47)
HCV AB SERPL QL IA: NONREACTIVE
HGB BLD-MCNC: 13.1 GM/DL (ref 12–16)
LYMPHOCYTES # BLD AUTO: 1.3 X10(3)/MCL (ref 0.6–4.6)
LYMPHOCYTES NFR BLD AUTO: 21 %
MCH RBC QN AUTO: 31.5 PG (ref 27–31)
MCHC RBC AUTO-ENTMCNC: 33.5 GM/DL (ref 33–36)
MCV RBC AUTO: 94 FL (ref 80–94)
MONOCYTES # BLD AUTO: 0.4 X10(3)/MCL (ref 0.1–1.3)
MONOCYTES NFR BLD AUTO: 7 %
NEG CONT SPOT COUNT: NORMAL
NEUTROPHILS # BLD AUTO: 3.78 X10(3)/MCL (ref 2.1–9.2)
NEUTROPHILS NFR BLD AUTO: 63 %
PANEL A SPOT COUNT: 0
PANEL B SPOT COUNT: 0
PLATELET # BLD AUTO: 195 X10(3)/MCL (ref 130–400)
PMV BLD AUTO: 9.4 FL (ref 9.4–12.4)
POS CONT SPOT COUNT: NORMAL
POTASSIUM SERPL-SCNC: 3.6 MMOL/L (ref 3.5–5.1)
PROT SERPL-MCNC: 7 GM/DL (ref 5.8–7.6)
RBC # BLD AUTO: 4.16 X10(6)/MCL (ref 4.2–5.4)
SODIUM SERPL-SCNC: 139 MMOL/L (ref 136–145)
T-SPOT.TB: NORMAL
WBC # SPEC AUTO: 6 X10(3)/MCL (ref 4.5–11.5)

## 2020-06-24 ENCOUNTER — HISTORICAL (OUTPATIENT)
Dept: CARDIOLOGY | Facility: HOSPITAL | Age: 75
End: 2020-06-24

## 2020-07-07 ENCOUNTER — HISTORICAL (OUTPATIENT)
Dept: ADMINISTRATIVE | Facility: HOSPITAL | Age: 75
End: 2020-07-07

## 2020-07-07 LAB
ALBUMIN SERPL-MCNC: 3.8 GM/DL (ref 3.4–4.8)
ALBUMIN/GLOB SERPL: 1.3 RATIO (ref 1.1–2)
ALP SERPL-CCNC: 63 UNIT/L (ref 40–150)
ALT SERPL-CCNC: 17 UNIT/L (ref 0–55)
AST SERPL-CCNC: 23 UNIT/L (ref 5–34)
BILIRUB SERPL-MCNC: 0.8 MG/DL
BILIRUBIN DIRECT+TOT PNL SERPL-MCNC: 0.4 MG/DL (ref 0–0.5)
BILIRUBIN DIRECT+TOT PNL SERPL-MCNC: 0.4 MG/DL (ref 0–0.8)
BUN SERPL-MCNC: 15.9 MG/DL (ref 9.8–20.1)
CALCIUM SERPL-MCNC: 9.1 MG/DL (ref 8.4–10.2)
CHLORIDE SERPL-SCNC: 100 MMOL/L (ref 98–107)
CHOLEST SERPL-MCNC: 212 MG/DL
CHOLEST/HDLC SERPL: 3 {RATIO} (ref 0–5)
CO2 SERPL-SCNC: 32 MMOL/L (ref 23–31)
CREAT SERPL-MCNC: 0.8 MG/DL (ref 0.55–1.02)
GLOBULIN SER-MCNC: 2.9 GM/DL (ref 2.4–3.5)
GLUCOSE SERPL-MCNC: 98 MG/DL (ref 82–115)
HDLC SERPL-MCNC: 63 MG/DL (ref 35–60)
LDLC SERPL CALC-MCNC: 125 MG/DL (ref 50–140)
POTASSIUM SERPL-SCNC: 4 MMOL/L (ref 3.5–5.1)
PROT SERPL-MCNC: 6.7 GM/DL (ref 5.8–7.6)
SODIUM SERPL-SCNC: 138 MMOL/L (ref 136–145)
TRIGL SERPL-MCNC: 120 MG/DL (ref 37–140)
VLDLC SERPL CALC-MCNC: 24 MG/DL

## 2020-07-22 ENCOUNTER — HISTORICAL (OUTPATIENT)
Dept: CARDIOLOGY | Facility: HOSPITAL | Age: 75
End: 2020-07-22

## 2020-07-31 ENCOUNTER — HISTORICAL (OUTPATIENT)
Dept: ADMINISTRATIVE | Facility: HOSPITAL | Age: 75
End: 2020-07-31

## 2020-08-31 ENCOUNTER — HISTORICAL (OUTPATIENT)
Dept: RESPIRATORY THERAPY | Facility: HOSPITAL | Age: 75
End: 2020-08-31

## 2020-09-01 ENCOUNTER — HISTORICAL (OUTPATIENT)
Dept: CARDIOLOGY | Facility: HOSPITAL | Age: 75
End: 2020-09-01

## 2020-09-24 ENCOUNTER — HISTORICAL (OUTPATIENT)
Dept: CARDIOLOGY | Facility: HOSPITAL | Age: 75
End: 2020-09-24

## 2020-10-05 ENCOUNTER — HISTORICAL (OUTPATIENT)
Dept: ADMINISTRATIVE | Facility: HOSPITAL | Age: 75
End: 2020-10-05

## 2020-10-05 LAB
ABS NEUT (OLG): 4.79 X10(3)/MCL (ref 2.1–9.2)
ALBUMIN SERPL-MCNC: 4 GM/DL (ref 3.4–4.8)
ALBUMIN/GLOB SERPL: 1.2 RATIO (ref 1.1–2)
ALP SERPL-CCNC: 70 UNIT/L (ref 40–150)
ALT SERPL-CCNC: 19 UNIT/L (ref 0–55)
AST SERPL-CCNC: 21 UNIT/L (ref 5–34)
BASOPHILS # BLD AUTO: 0.1 X10(3)/MCL (ref 0–0.2)
BASOPHILS NFR BLD AUTO: 1 %
BILIRUB SERPL-MCNC: 0.4 MG/DL
BILIRUBIN DIRECT+TOT PNL SERPL-MCNC: 0.1 MG/DL (ref 0–0.5)
BILIRUBIN DIRECT+TOT PNL SERPL-MCNC: 0.3 MG/DL (ref 0–0.8)
BUN SERPL-MCNC: 20 MG/DL (ref 9.8–20.1)
CALCIUM SERPL-MCNC: 9.1 MG/DL (ref 8.4–10.2)
CHLORIDE SERPL-SCNC: 97 MMOL/L (ref 98–107)
CO2 SERPL-SCNC: 30 MMOL/L (ref 23–31)
CREAT SERPL-MCNC: 0.9 MG/DL (ref 0.55–1.02)
CRP SERPL HS-MCNC: 0.05 MG/DL
EOSINOPHIL # BLD AUTO: 0.3 X10(3)/MCL (ref 0–0.9)
EOSINOPHIL NFR BLD AUTO: 4 %
ERYTHROCYTE [DISTWIDTH] IN BLOOD BY AUTOMATED COUNT: 12.5 % (ref 11.5–17)
ERYTHROCYTE [SEDIMENTATION RATE] IN BLOOD: 14 MM/HR (ref 0–20)
GLOBULIN SER-MCNC: 3.2 GM/DL (ref 2.4–3.5)
GLUCOSE SERPL-MCNC: 93 MG/DL (ref 82–115)
HCT VFR BLD AUTO: 40.4 % (ref 37–47)
HGB BLD-MCNC: 13.9 GM/DL (ref 12–16)
LYMPHOCYTES # BLD AUTO: 1.6 X10(3)/MCL (ref 0.6–4.6)
LYMPHOCYTES NFR BLD AUTO: 21 %
MCH RBC QN AUTO: 32.3 PG (ref 27–31)
MCHC RBC AUTO-ENTMCNC: 34.4 GM/DL (ref 33–36)
MCV RBC AUTO: 94 FL (ref 80–94)
MONOCYTES # BLD AUTO: 0.6 X10(3)/MCL (ref 0.1–1.3)
MONOCYTES NFR BLD AUTO: 8 %
NEUTROPHILS # BLD AUTO: 4.79 X10(3)/MCL (ref 2.1–9.2)
NEUTROPHILS NFR BLD AUTO: 65 %
PLATELET # BLD AUTO: 206 X10(3)/MCL (ref 130–400)
PMV BLD AUTO: 9 FL (ref 9.4–12.4)
POTASSIUM SERPL-SCNC: 4.3 MMOL/L (ref 3.5–5.1)
PROT SERPL-MCNC: 7.2 GM/DL (ref 5.8–7.6)
RBC # BLD AUTO: 4.3 X10(6)/MCL (ref 4.2–5.4)
SODIUM SERPL-SCNC: 135 MMOL/L (ref 136–145)
WBC # SPEC AUTO: 7.4 X10(3)/MCL (ref 4.5–11.5)

## 2020-10-20 ENCOUNTER — HISTORICAL (OUTPATIENT)
Dept: CARDIOLOGY | Facility: HOSPITAL | Age: 75
End: 2020-10-20

## 2020-11-25 ENCOUNTER — HISTORICAL (OUTPATIENT)
Dept: CARDIOLOGY | Facility: HOSPITAL | Age: 75
End: 2020-11-25

## 2020-12-09 ENCOUNTER — HISTORICAL (OUTPATIENT)
Dept: CARDIOLOGY | Facility: HOSPITAL | Age: 75
End: 2020-12-09

## 2020-12-14 ENCOUNTER — TELEPHONE (OUTPATIENT)
Dept: TRANSPLANT | Facility: CLINIC | Age: 75
End: 2020-12-14

## 2020-12-14 DIAGNOSIS — R06.82 TACHYPNEA: ICD-10-CM

## 2020-12-14 DIAGNOSIS — I27.9 CHRONIC CARDIOPULMONARY DISEASE: ICD-10-CM

## 2020-12-14 DIAGNOSIS — Z79.899 POLYPHARMACY: Primary | ICD-10-CM

## 2020-12-17 ENCOUNTER — HISTORICAL (OUTPATIENT)
Dept: CARDIOLOGY | Facility: HOSPITAL | Age: 75
End: 2020-12-17

## 2021-01-01 ENCOUNTER — HISTORICAL (OUTPATIENT)
Dept: CARDIOLOGY | Facility: HOSPITAL | Age: 76
End: 2021-01-01

## 2021-01-01 ENCOUNTER — HISTORICAL (OUTPATIENT)
Dept: ADMINISTRATIVE | Facility: HOSPITAL | Age: 76
End: 2021-01-01

## 2021-01-01 ENCOUNTER — PATIENT MESSAGE (OUTPATIENT)
Dept: TRANSPLANT | Facility: CLINIC | Age: 76
End: 2021-01-01
Payer: MEDICARE

## 2021-01-01 ENCOUNTER — HISTORICAL (OUTPATIENT)
Dept: RESPIRATORY THERAPY | Facility: HOSPITAL | Age: 76
End: 2021-01-01

## 2021-01-01 LAB
ABS NEUT (OLG): 2.07 X10(3)/MCL (ref 2.1–9.2)
ABS NEUT (OLG): 3.57 X10(3)/MCL (ref 2.1–9.2)
ABS NEUT (OLG): 3.76 X10(3)/MCL (ref 2.1–9.2)
ABS NEUT (OLG): 4.63 X10(3)/MCL (ref 2.1–9.2)
ALBUMIN SERPL-MCNC: 3.3 GM/DL (ref 3.4–4.8)
ALBUMIN SERPL-MCNC: 4 GM/DL (ref 3.4–4.8)
ALBUMIN/GLOB SERPL: 1 RATIO (ref 1.1–2)
ALBUMIN/GLOB SERPL: 1.3 RATIO (ref 1.1–2)
ALP SERPL-CCNC: 66 UNIT/L (ref 40–150)
ALP SERPL-CCNC: 74 UNIT/L (ref 40–150)
ALT SERPL-CCNC: 10 UNIT/L (ref 0–55)
ALT SERPL-CCNC: 16 UNIT/L (ref 0–55)
AST SERPL-CCNC: 20 UNIT/L (ref 5–34)
AST SERPL-CCNC: 21 UNIT/L (ref 5–34)
BASOPHILS # BLD AUTO: 0.1 X10(3)/MCL (ref 0–0.2)
BASOPHILS NFR BLD AUTO: 1 %
BASOPHILS NFR BLD AUTO: 1 %
BASOPHILS NFR BLD AUTO: 2 %
BASOPHILS NFR BLD MANUAL: 1 % (ref 0–2)
BILIRUB SERPL-MCNC: 0.5 MG/DL
BILIRUB SERPL-MCNC: 0.6 MG/DL
BILIRUBIN DIRECT+TOT PNL SERPL-MCNC: 0.2 MG/DL (ref 0–0.5)
BILIRUBIN DIRECT+TOT PNL SERPL-MCNC: 0.2 MG/DL (ref 0–0.5)
BILIRUBIN DIRECT+TOT PNL SERPL-MCNC: 0.3 MG/DL (ref 0–0.8)
BILIRUBIN DIRECT+TOT PNL SERPL-MCNC: 0.4 MG/DL (ref 0–0.8)
BUN SERPL-MCNC: 16.8 MG/DL (ref 9.8–20.1)
BUN SERPL-MCNC: 20.3 MG/DL (ref 9.8–20.1)
BURR CELLS BLD QL SMEAR: 2 (ref 0–0)
CALCIUM SERPL-MCNC: 9.2 MG/DL (ref 8.4–10.2)
CALCIUM SERPL-MCNC: 9.6 MG/DL (ref 8.7–10.5)
CHLORIDE SERPL-SCNC: 103 MMOL/L (ref 98–107)
CHLORIDE SERPL-SCNC: 105 MMOL/L (ref 98–107)
CHOLEST SERPL-MCNC: 216 MG/DL
CHOLEST/HDLC SERPL: 4 {RATIO} (ref 0–5)
CK SERPL-CCNC: 90 U/L (ref 29–168)
CO2 SERPL-SCNC: 27 MMOL/L (ref 23–31)
CO2 SERPL-SCNC: 29 MMOL/L (ref 23–31)
CREAT SERPL-MCNC: 0.92 MG/DL (ref 0.55–1.02)
CREAT SERPL-MCNC: 1.05 MG/DL (ref 0.55–1.02)
CRP SERPL-MCNC: <0.1 MG/DL
DEPRECATED CALCIDIOL+CALCIFEROL SERPL-MC: 47.1 NG/ML (ref 30–80)
EOSINOPHIL # BLD AUTO: 0.4 X10(3)/MCL (ref 0–0.9)
EOSINOPHIL # BLD AUTO: 0.4 X10(3)/MCL (ref 0–0.9)
EOSINOPHIL # BLD AUTO: 0.6 X10(3)/MCL (ref 0–0.9)
EOSINOPHIL NFR BLD AUTO: 7 %
EOSINOPHIL NFR BLD AUTO: 7 %
EOSINOPHIL NFR BLD AUTO: 8 %
EOSINOPHIL NFR BLD MANUAL: 19 % (ref 0–8)
ERYTHROCYTE [DISTWIDTH] IN BLOOD BY AUTOMATED COUNT: 12 % (ref 11.5–17)
ERYTHROCYTE [DISTWIDTH] IN BLOOD BY AUTOMATED COUNT: 12.2 % (ref 11.5–17)
ERYTHROCYTE [DISTWIDTH] IN BLOOD BY AUTOMATED COUNT: 13.4 % (ref 11.5–17)
ERYTHROCYTE [DISTWIDTH] IN BLOOD BY AUTOMATED COUNT: 14.2 % (ref 11.5–17)
ERYTHROCYTE [SEDIMENTATION RATE] IN BLOOD: 18 MM/HR (ref 0–20)
FERRITIN SERPL-MCNC: 156.03 NG/ML (ref 4.63–204)
FOLATE SERPL-MCNC: 15.9 NG/ML (ref 7–31.4)
GLOBULIN SER-MCNC: 3 GM/DL (ref 2.4–3.5)
GLOBULIN SER-MCNC: 3.3 GM/DL (ref 2.4–3.5)
GLUCOSE SERPL-MCNC: 87 MG/DL (ref 82–115)
GLUCOSE SERPL-MCNC: 96 MG/DL (ref 82–115)
HCT VFR BLD AUTO: 31.9 % (ref 37–47)
HCT VFR BLD AUTO: 34.6 % (ref 37–47)
HCT VFR BLD AUTO: 38.8 % (ref 37–47)
HCT VFR BLD AUTO: 39.2 % (ref 37–47)
HDLC SERPL-MCNC: 59 MG/DL (ref 35–60)
HGB BLD-MCNC: 10.4 GM/DL (ref 12–16)
HGB BLD-MCNC: 11.3 GM/DL (ref 12–16)
HGB BLD-MCNC: 12.7 GM/DL (ref 12–16)
HGB BLD-MCNC: 12.9 GM/DL (ref 12–16)
IMM GRANULOCYTES # BLD AUTO: 0.02 10*3/UL
IMM GRANULOCYTES NFR BLD AUTO: 0 %
IRON SATN MFR SERPL: 37 % (ref 20–50)
IRON SERPL-MCNC: 101 UG/DL (ref 50–170)
LDLC SERPL CALC-MCNC: 133 MG/DL (ref 50–140)
LYMPHOCYTES # BLD AUTO: 1.1 X10(3)/MCL (ref 0.6–4.6)
LYMPHOCYTES # BLD AUTO: 1.5 X10(3)/MCL (ref 0.6–4.6)
LYMPHOCYTES # BLD AUTO: 1.5 X10(3)/MCL (ref 0.6–4.6)
LYMPHOCYTES NFR BLD AUTO: 16 %
LYMPHOCYTES NFR BLD AUTO: 25 %
LYMPHOCYTES NFR BLD AUTO: 25 %
LYMPHOCYTES NFR BLD MANUAL: 12 % (ref 13–40)
MACROCYTES BLD QL SMEAR: 1 /MCL
MCH RBC QN AUTO: 31.8 PG (ref 27–31)
MCH RBC QN AUTO: 31.9 PG (ref 27–31)
MCH RBC QN AUTO: 31.9 PG (ref 27–31)
MCH RBC QN AUTO: 32.4 PG (ref 27–31)
MCHC RBC AUTO-ENTMCNC: 32.6 GM/DL (ref 33–36)
MCHC RBC AUTO-ENTMCNC: 32.7 GM/DL (ref 33–36)
MCHC RBC AUTO-ENTMCNC: 32.7 GM/DL (ref 33–36)
MCHC RBC AUTO-ENTMCNC: 32.9 GM/DL (ref 33–36)
MCV RBC AUTO: 97 FL (ref 80–94)
MCV RBC AUTO: 97.5 FL (ref 80–94)
MCV RBC AUTO: 97.5 FL (ref 80–94)
MCV RBC AUTO: 99.4 FL (ref 80–94)
MONOCYTES # BLD AUTO: 0.3 X10(3)/MCL (ref 0.1–1.3)
MONOCYTES # BLD AUTO: 0.4 X10(3)/MCL (ref 0.1–1.3)
MONOCYTES # BLD AUTO: 0.4 X10(3)/MCL (ref 0.1–1.3)
MONOCYTES NFR BLD AUTO: 5 %
MONOCYTES NFR BLD AUTO: 7 %
MONOCYTES NFR BLD AUTO: 7 %
MONOCYTES NFR BLD MANUAL: 7 % (ref 2–11)
NEUTROPHILS # BLD AUTO: 3.57 X10(3)/MCL (ref 2.1–9.2)
NEUTROPHILS # BLD AUTO: 3.76 X10(3)/MCL (ref 2.1–9.2)
NEUTROPHILS # BLD AUTO: 4.63 X10(3)/MCL (ref 2.1–9.2)
NEUTROPHILS NFR BLD AUTO: 59 %
NEUTROPHILS NFR BLD AUTO: 60 %
NEUTROPHILS NFR BLD AUTO: 69 %
NEUTROPHILS NFR BLD MANUAL: 61 % (ref 47–80)
PLATELET # BLD AUTO: 188 X10(3)/MCL (ref 130–400)
PLATELET # BLD AUTO: 197 X10(3)/MCL (ref 130–400)
PLATELET # BLD AUTO: 204 X10(3)/MCL (ref 130–400)
PLATELET # BLD AUTO: 209 X10(3)/MCL (ref 130–400)
PLATELET # BLD EST: ADEQUATE 10*3/UL
PMV BLD AUTO: 9.6 FL (ref 9.4–12.4)
PMV BLD AUTO: 9.6 FL (ref 9.4–12.4)
PMV BLD AUTO: 9.8 FL (ref 9.4–12.4)
PMV BLD AUTO: 9.9 FL (ref 7.4–10.4)
POIKILOCYTOSIS BLD QL SMEAR: 2
POTASSIUM SERPL-SCNC: 4.4 MMOL/L (ref 3.5–5.1)
POTASSIUM SERPL-SCNC: 4.5 MMOL/L (ref 3.5–5.1)
PROT SERPL-MCNC: 6.6 GM/DL (ref 5.8–7.6)
PROT SERPL-MCNC: 7 GM/DL (ref 5.8–7.6)
RBC # BLD AUTO: 3.21 X10(6)/MCL (ref 4.2–5.4)
RBC # BLD AUTO: 3.55 X10(6)/MCL (ref 4.2–5.4)
RBC # BLD AUTO: 3.98 X10(6)/MCL (ref 4.2–5.4)
RBC # BLD AUTO: 4.04 X10(6)/MCL (ref 4.2–5.4)
RBC MORPH BLD: ABNORMAL
SARS-COV-2 RNA RESP QL NAA+PROBE: NEGATIVE
SARS-COV-2 RNA RESP QL NAA+PROBE: NEGATIVE
SODIUM SERPL-SCNC: 139 MMOL/L (ref 136–145)
SODIUM SERPL-SCNC: 145 MMOL/L (ref 136–145)
TIBC SERPL-MCNC: 172 UG/DL (ref 70–310)
TIBC SERPL-MCNC: 273 UG/DL (ref 250–450)
TRANSFERRIN SERPL-MCNC: 231 MG/DL (ref 173–360)
TRIGL SERPL-MCNC: 122 MG/DL (ref 37–140)
TSH SERPL-ACNC: 4.88 UIU/ML (ref 0.35–4.94)
TSH SERPL-ACNC: 5.73 UIU/ML (ref 0.35–4.94)
VIT B12 SERPL-MCNC: 308 PG/ML (ref 213–816)
VLDLC SERPL CALC-MCNC: 24 MG/DL
WBC # SPEC AUTO: 3.8 X10(3)/MCL (ref 4.5–11.5)
WBC # SPEC AUTO: 6.1 X10(3)/MCL (ref 4.5–11.5)
WBC # SPEC AUTO: 6.2 X10(3)/MCL (ref 4.5–11.5)
WBC # SPEC AUTO: 6.7 X10(3)/MCL (ref 4.5–11.5)

## 2021-01-04 ENCOUNTER — HISTORICAL (OUTPATIENT)
Dept: RESPIRATORY THERAPY | Facility: HOSPITAL | Age: 76
End: 2021-01-04

## 2021-01-05 ENCOUNTER — HISTORICAL (OUTPATIENT)
Dept: CARDIOLOGY | Facility: HOSPITAL | Age: 76
End: 2021-01-05

## 2021-01-06 ENCOUNTER — OFFICE VISIT (OUTPATIENT)
Dept: TRANSPLANT | Facility: CLINIC | Age: 76
End: 2021-01-06
Payer: MEDICARE

## 2021-01-06 ENCOUNTER — HOSPITAL ENCOUNTER (OUTPATIENT)
Dept: PULMONOLOGY | Facility: CLINIC | Age: 76
Discharge: HOME | End: 2021-01-06
Payer: MEDICARE

## 2021-01-06 VITALS
HEIGHT: 64 IN | HEART RATE: 55 BPM | DIASTOLIC BLOOD PRESSURE: 71 MMHG | SYSTOLIC BLOOD PRESSURE: 165 MMHG | BODY MASS INDEX: 30.9 KG/M2 | OXYGEN SATURATION: 97 % | WEIGHT: 181 LBS

## 2021-01-06 VITALS — WEIGHT: 182 LBS | BODY MASS INDEX: 31.07 KG/M2 | HEIGHT: 64 IN

## 2021-01-06 DIAGNOSIS — I27.20 PULMONARY HYPERTENSION: ICD-10-CM

## 2021-01-06 DIAGNOSIS — J84.112 UIP (USUAL INTERSTITIAL PNEUMONITIS): ICD-10-CM

## 2021-01-06 DIAGNOSIS — Z79.01 ANTICOAGULATION ADEQUATE: Primary | ICD-10-CM

## 2021-01-06 DIAGNOSIS — I27.9 CHRONIC CARDIOPULMONARY DISEASE: Primary | ICD-10-CM

## 2021-01-06 DIAGNOSIS — R06.02 SHORTNESS OF BREATH: ICD-10-CM

## 2021-01-06 DIAGNOSIS — I27.9 CHRONIC CARDIOPULMONARY DISEASE: ICD-10-CM

## 2021-01-06 DIAGNOSIS — I10 ESSENTIAL HYPERTENSION: ICD-10-CM

## 2021-01-06 DIAGNOSIS — Z86.79 ATRIAL FIBRILLATION, CURRENTLY IN SINUS RHYTHM: ICD-10-CM

## 2021-01-06 PROCEDURE — 99214 OFFICE O/P EST MOD 30 MIN: CPT | Mod: PBBFAC,25 | Performed by: INTERNAL MEDICINE

## 2021-01-06 PROCEDURE — 99204 OFFICE O/P NEW MOD 45 MIN: CPT | Mod: S$PBB,,, | Performed by: INTERNAL MEDICINE

## 2021-01-06 PROCEDURE — 99999 PR PBB SHADOW E&M-EST. PATIENT-LVL IV: ICD-10-PCS | Mod: PBBFAC,,, | Performed by: INTERNAL MEDICINE

## 2021-01-06 PROCEDURE — 99204 PR OFFICE/OUTPT VISIT, NEW, LEVL IV, 45-59 MIN: ICD-10-PCS | Mod: S$PBB,,, | Performed by: INTERNAL MEDICINE

## 2021-01-06 PROCEDURE — 94618 PULMONARY STRESS TESTING: CPT | Mod: 26,S$PBB,, | Performed by: INTERNAL MEDICINE

## 2021-01-06 PROCEDURE — 94618 PULMONARY STRESS TESTING: ICD-10-PCS | Mod: 26,S$PBB,, | Performed by: INTERNAL MEDICINE

## 2021-01-06 PROCEDURE — 99999 PR PBB SHADOW E&M-EST. PATIENT-LVL IV: CPT | Mod: PBBFAC,,, | Performed by: INTERNAL MEDICINE

## 2021-01-06 PROCEDURE — 94618 PULMONARY STRESS TESTING: CPT | Mod: PBBFAC | Performed by: INTERNAL MEDICINE

## 2021-01-06 RX ORDER — GABAPENTIN 300 MG/1
300 TABLET ORAL NIGHTLY
COMMUNITY

## 2021-01-06 RX ORDER — HYDROCHLOROTHIAZIDE 25 MG/1
25 TABLET ORAL EVERY MORNING
COMMUNITY
Start: 2020-12-28 | End: 2022-01-01

## 2021-01-06 RX ORDER — FLECAINIDE ACETATE 100 MG/1
100 TABLET ORAL 2 TIMES DAILY
COMMUNITY
Start: 2020-12-18

## 2021-01-06 RX ORDER — CELECOXIB 200 MG/1
200 CAPSULE ORAL DAILY
Status: ON HOLD | COMMUNITY
Start: 2020-02-27 | End: 2022-01-01

## 2021-01-06 RX ORDER — LORATADINE 10 MG/1
10 TABLET ORAL
COMMUNITY

## 2021-01-06 RX ORDER — HYDROXYZINE PAMOATE 25 MG/1
25 CAPSULE ORAL NIGHTLY
COMMUNITY
Start: 2020-12-07

## 2021-01-06 RX ORDER — PRAVASTATIN SODIUM 20 MG/1
20 TABLET ORAL NIGHTLY
COMMUNITY
Start: 2021-01-04

## 2021-01-06 RX ORDER — WARFARIN SODIUM 5 MG/1
5 TABLET ORAL DAILY
Status: ON HOLD | COMMUNITY
Start: 2020-12-28 | End: 2022-01-01

## 2021-01-06 RX ORDER — AMOXICILLIN 500 MG
CAPSULE ORAL DAILY
Status: ON HOLD | COMMUNITY
End: 2022-01-01

## 2021-01-06 RX ORDER — LEVOTHYROXINE SODIUM 50 UG/1
50 TABLET ORAL DAILY
COMMUNITY
Start: 2020-12-07 | End: 2022-01-01

## 2021-01-06 RX ORDER — WARFARIN 2.5 MG/1
2.5 TABLET ORAL
Status: ON HOLD | COMMUNITY
End: 2022-01-01

## 2021-01-06 RX ORDER — BROMPHENIRAMINE MALEATE, DEXTROMETHORPHAN HBR, PHENYLEPHRINE HCL, DIPHENHYDRAMINE HCL, PHENYLEPHRINE HCL 0.52G
0.52 KIT ORAL 2 TIMES DAILY
COMMUNITY

## 2021-01-06 RX ORDER — METOPROLOL SUCCINATE 25 MG/1
25 TABLET, EXTENDED RELEASE ORAL DAILY
COMMUNITY
Start: 2020-10-06

## 2021-01-06 RX ORDER — ZOLPIDEM TARTRATE 5 MG/1
5 TABLET ORAL NIGHTLY PRN
COMMUNITY
End: 2022-01-01

## 2021-01-06 RX ORDER — ACETAMINOPHEN 500 MG
TABLET ORAL
COMMUNITY

## 2021-01-06 RX ORDER — OMEPRAZOLE 20 MG/1
20 CAPSULE, DELAYED RELEASE ORAL DAILY
COMMUNITY
Start: 2021-01-04

## 2021-01-13 ENCOUNTER — HISTORICAL (OUTPATIENT)
Dept: CARDIOLOGY | Facility: HOSPITAL | Age: 76
End: 2021-01-13

## 2021-01-19 PROBLEM — I10 ESSENTIAL HYPERTENSION: Status: ACTIVE | Noted: 2021-01-19

## 2021-01-19 PROBLEM — I27.20 PULMONARY HYPERTENSION: Status: ACTIVE | Noted: 2021-01-19

## 2021-01-19 PROBLEM — R06.02 SHORTNESS OF BREATH: Status: ACTIVE | Noted: 2021-01-19

## 2021-01-19 PROBLEM — Z86.79 ATRIAL FIBRILLATION, CURRENTLY IN SINUS RHYTHM: Status: ACTIVE | Noted: 2021-01-19

## 2021-01-19 PROBLEM — J84.112 UIP (USUAL INTERSTITIAL PNEUMONITIS): Status: ACTIVE | Noted: 2021-01-19

## 2021-01-21 ENCOUNTER — HISTORICAL (OUTPATIENT)
Dept: CARDIOLOGY | Facility: HOSPITAL | Age: 76
End: 2021-01-21

## 2021-02-02 ENCOUNTER — TELEPHONE (OUTPATIENT)
Dept: TRANSPLANT | Facility: CLINIC | Age: 76
End: 2021-02-02

## 2021-02-04 ENCOUNTER — PATIENT MESSAGE (OUTPATIENT)
Dept: MEDSURG UNIT | Facility: HOSPITAL | Age: 76
End: 2021-02-04

## 2021-02-04 DIAGNOSIS — E78.5 HYPERLIPIDEMIA, UNSPECIFIED HYPERLIPIDEMIA TYPE: ICD-10-CM

## 2021-02-04 DIAGNOSIS — E03.9 PRIMARY HYPOTHYROIDISM: Primary | ICD-10-CM

## 2021-02-04 DIAGNOSIS — E55.9 VITAMIN D DEFICIENCY: ICD-10-CM

## 2021-02-05 ENCOUNTER — PATIENT MESSAGE (OUTPATIENT)
Dept: MEDSURG UNIT | Facility: HOSPITAL | Age: 76
End: 2021-02-05

## 2021-02-05 RX ORDER — ENOXAPARIN SODIUM 100 MG/ML
80 INJECTION SUBCUTANEOUS 2 TIMES DAILY
Qty: 10 SYRINGE | Refills: 1 | Status: SHIPPED | OUTPATIENT
Start: 2021-02-05 | End: 2022-01-01

## 2021-02-11 ENCOUNTER — HISTORICAL (OUTPATIENT)
Dept: CARDIOLOGY | Facility: HOSPITAL | Age: 76
End: 2021-02-11

## 2021-02-11 ENCOUNTER — PATIENT MESSAGE (OUTPATIENT)
Dept: MEDSURG UNIT | Facility: HOSPITAL | Age: 76
End: 2021-02-11

## 2021-02-11 ENCOUNTER — TELEPHONE (OUTPATIENT)
Dept: TRANSPLANT | Facility: CLINIC | Age: 76
End: 2021-02-11

## 2021-02-12 ENCOUNTER — HISTORICAL (OUTPATIENT)
Dept: ADMINISTRATIVE | Facility: HOSPITAL | Age: 76
End: 2021-02-12

## 2021-02-22 ENCOUNTER — PATIENT MESSAGE (OUTPATIENT)
Dept: MEDSURG UNIT | Facility: HOSPITAL | Age: 76
End: 2021-02-22

## 2021-02-22 ENCOUNTER — HISTORICAL (OUTPATIENT)
Dept: CARDIOLOGY | Facility: HOSPITAL | Age: 76
End: 2021-02-22

## 2021-02-22 ENCOUNTER — TELEPHONE (OUTPATIENT)
Dept: TRANSPLANT | Facility: CLINIC | Age: 76
End: 2021-02-22

## 2021-02-23 ENCOUNTER — PATIENT MESSAGE (OUTPATIENT)
Dept: MEDSURG UNIT | Facility: HOSPITAL | Age: 76
End: 2021-02-23

## 2021-02-25 ENCOUNTER — HOSPITAL ENCOUNTER (OUTPATIENT)
Dept: CARDIOLOGY | Facility: HOSPITAL | Age: 76
Discharge: HOME | End: 2021-02-25
Attending: INTERNAL MEDICINE
Payer: MEDICARE

## 2021-02-25 ENCOUNTER — HOSPITAL ENCOUNTER (OUTPATIENT)
Facility: HOSPITAL | Age: 76
Discharge: HOME | End: 2021-02-25
Attending: INTERNAL MEDICINE | Admitting: INTERNAL MEDICINE
Payer: MEDICARE

## 2021-02-25 ENCOUNTER — TELEPHONE (OUTPATIENT)
Dept: TRANSPLANT | Facility: HOSPITAL | Age: 76
End: 2021-02-25

## 2021-02-25 VITALS
HEART RATE: 58 BPM | RESPIRATION RATE: 16 BRPM | HEIGHT: 64 IN | TEMPERATURE: 97 F | SYSTOLIC BLOOD PRESSURE: 164 MMHG | OXYGEN SATURATION: 98 % | WEIGHT: 178.69 LBS | DIASTOLIC BLOOD PRESSURE: 82 MMHG | BODY MASS INDEX: 30.51 KG/M2

## 2021-02-25 VITALS
HEART RATE: 47 BPM | SYSTOLIC BLOOD PRESSURE: 122 MMHG | WEIGHT: 181 LBS | DIASTOLIC BLOOD PRESSURE: 80 MMHG | BODY MASS INDEX: 30.9 KG/M2 | HEIGHT: 64 IN

## 2021-02-25 DIAGNOSIS — R06.02 SHORTNESS OF BREATH: Primary | ICD-10-CM

## 2021-02-25 DIAGNOSIS — I27.9 CHRONIC CARDIOPULMONARY DISEASE: ICD-10-CM

## 2021-02-25 DIAGNOSIS — I27.20 PULMONARY HYPERTENSION: ICD-10-CM

## 2021-02-25 DIAGNOSIS — J84.112 UIP (USUAL INTERSTITIAL PNEUMONITIS): ICD-10-CM

## 2021-02-25 DIAGNOSIS — I10 ESSENTIAL HYPERTENSION: ICD-10-CM

## 2021-02-25 LAB
ASCENDING AORTA: 3.08 CM
AV INDEX (PROSTH): 0.63
AV MEAN GRADIENT: 6 MMHG
AV PEAK GRADIENT: 10 MMHG
AV VALVE AREA: 2.17 CM2
AV VELOCITY RATIO: 0.64
BSA FOR ECHO PROCEDURE: 1.93 M2
CV ECHO LV RWT: 0.31 CM
DOP CALC AO PEAK VEL: 1.6 M/S
DOP CALC AO VTI: 41.9 CM
DOP CALC LVOT AREA: 3.5 CM2
DOP CALC LVOT DIAMETER: 2.1 CM
DOP CALC LVOT PEAK VEL: 1.02 M/S
DOP CALC LVOT STROKE VOLUME: 90.98 CM3
DOP CALCLVOT PEAK VEL VTI: 26.28 CM
E WAVE DECELERATION TIME: 196.85 MSEC
E/A RATIO: 3.97
E/E' RATIO: 14.38 M/S
ECHO LV POSTERIOR WALL: 0.7 CM (ref 0.6–1.1)
FRACTIONAL SHORTENING: 31 % (ref 28–44)
INTERVENTRICULAR SEPTUM: 0.93 CM (ref 0.6–1.1)
LA MAJOR: 4.56 CM
LA MINOR: 4.54 CM
LA WIDTH: 3.74 CM
LEFT ATRIUM SIZE: 3.25 CM
LEFT ATRIUM VOLUME INDEX MOD: 26.8 ML/M2
LEFT ATRIUM VOLUME INDEX: 25.1 ML/M2
LEFT ATRIUM VOLUME MOD: 50.18 CM3
LEFT ATRIUM VOLUME: 47.01 CM3
LEFT INTERNAL DIMENSION IN SYSTOLE: 3.11 CM (ref 2.1–4)
LEFT VENTRICLE DIASTOLIC VOLUME INDEX: 50.58 ML/M2
LEFT VENTRICLE DIASTOLIC VOLUME: 94.58 ML
LEFT VENTRICLE MASS INDEX: 63 G/M2
LEFT VENTRICLE SYSTOLIC VOLUME INDEX: 20.5 ML/M2
LEFT VENTRICLE SYSTOLIC VOLUME: 38.28 ML
LEFT VENTRICULAR INTERNAL DIMENSION IN DIASTOLE: 4.54 CM (ref 3.5–6)
LEFT VENTRICULAR MASS: 118.17 G
LV LATERAL E/E' RATIO: 11.5 M/S
LV SEPTAL E/E' RATIO: 19.17 M/S
MV A" WAVE DURATION": 15.98 MSEC
MV PEAK A VEL: 0.29 M/S
MV PEAK E VEL: 1.15 M/S
MV STENOSIS PRESSURE HALF TIME: 57.09 MS
MV VALVE AREA P 1/2 METHOD: 3.85 CM2
PISA TR MAX VEL: 2.62 M/S
PULM VEIN S/D RATIO: 0.69
PV PEAK D VEL: 0.39 M/S
PV PEAK S VEL: 0.27 M/S
RA MAJOR: 3.65 CM
RA PRESSURE: 3 MMHG
RA WIDTH: 3.14 CM
RIGHT VENTRICULAR END-DIASTOLIC DIMENSION: 3.23 CM
RV TISSUE DOPPLER FREE WALL SYSTOLIC VELOCITY 1 (APICAL 4 CHAMBER VIEW): 8.79 CM/S
SINUS: 3.06 CM
STJ: 2.8 CM
TDI LATERAL: 0.1 M/S
TDI SEPTAL: 0.06 M/S
TDI: 0.08 M/S
TR MAX PG: 27 MMHG
TRICUSPID ANNULAR PLANE SYSTOLIC EXCURSION: 1.88 CM
TV REST PULMONARY ARTERY PRESSURE: 30 MMHG

## 2021-02-25 PROCEDURE — 93464 EXERCISE W/HEMODYNAMIC MEAS: CPT | Performed by: INTERNAL MEDICINE

## 2021-02-25 PROCEDURE — 25000003 PHARM REV CODE 250: Performed by: INTERNAL MEDICINE

## 2021-02-25 PROCEDURE — 93451 RIGHT HEART CATH: CPT | Performed by: INTERNAL MEDICINE

## 2021-02-25 PROCEDURE — 93464 EXERCISE W/HEMODYNAMIC MEAS: CPT | Mod: 26,,, | Performed by: INTERNAL MEDICINE

## 2021-02-25 PROCEDURE — 93306 ECHO (CUPID ONLY): ICD-10-PCS | Mod: 26,,, | Performed by: INTERNAL MEDICINE

## 2021-02-25 PROCEDURE — 93464 PR PHYSIOLOGIC EXERCISE STUDY & HEMODYNAMIC MEASURE: ICD-10-PCS | Mod: 26,,, | Performed by: INTERNAL MEDICINE

## 2021-02-25 PROCEDURE — 93306 TTE W/DOPPLER COMPLETE: CPT | Mod: 26,,, | Performed by: INTERNAL MEDICINE

## 2021-02-25 PROCEDURE — 93306 TTE W/DOPPLER COMPLETE: CPT

## 2021-02-25 PROCEDURE — 93451 RIGHT HEART CATH: CPT | Mod: 26,,, | Performed by: INTERNAL MEDICINE

## 2021-02-25 PROCEDURE — C1894 INTRO/SHEATH, NON-LASER: HCPCS | Performed by: INTERNAL MEDICINE

## 2021-02-25 PROCEDURE — C1751 CATH, INF, PER/CENT/MIDLINE: HCPCS | Performed by: INTERNAL MEDICINE

## 2021-02-25 PROCEDURE — 93451 PR RIGHT HEART CATH O2 SATURATION & CARDIAC OUTPUT: ICD-10-PCS | Mod: 26,,, | Performed by: INTERNAL MEDICINE

## 2021-02-25 RX ORDER — LOSARTAN POTASSIUM 50 MG/1
50 TABLET ORAL DAILY
COMMUNITY

## 2021-02-25 RX ORDER — SPIRONOLACTONE 25 MG/1
25 TABLET ORAL DAILY
Qty: 30 TABLET | Refills: 11 | Status: SHIPPED | OUTPATIENT
Start: 2021-02-25 | End: 2022-01-01

## 2021-02-25 RX ORDER — LIDOCAINE HYDROCHLORIDE AND EPINEPHRINE 20; 10 MG/ML; UG/ML
INJECTION, SOLUTION INFILTRATION; PERINEURAL
Status: DISCONTINUED | OUTPATIENT
Start: 2021-02-25 | End: 2021-02-25 | Stop reason: HOSPADM

## 2021-02-25 RX ORDER — BUTYROSPERMUM PARKII(SHEA BUTTER), SIMMONDSIA CHINENSIS (JOJOBA) SEED OIL, ALOE BARBADENSIS LEAF EXTRACT .01; 1; 3.5 G/100G; G/100G; G/100G
250 LIQUID TOPICAL 2 TIMES DAILY
COMMUNITY

## 2021-02-25 RX ORDER — SPIRONOLACTONE 25 MG/1
25 TABLET ORAL DAILY
Status: DISCONTINUED | OUTPATIENT
Start: 2021-02-25 | End: 2021-02-25

## 2021-02-26 ENCOUNTER — TELEPHONE (OUTPATIENT)
Dept: TRANSPLANT | Facility: CLINIC | Age: 76
End: 2021-02-26

## 2021-03-01 ENCOUNTER — HISTORICAL (OUTPATIENT)
Dept: CARDIOLOGY | Facility: HOSPITAL | Age: 76
End: 2021-03-01

## 2021-03-03 ENCOUNTER — TELEPHONE (OUTPATIENT)
Dept: RESEARCH | Facility: HOSPITAL | Age: 76
End: 2021-03-03

## 2021-03-03 ENCOUNTER — PATIENT MESSAGE (OUTPATIENT)
Dept: TRANSPLANT | Facility: CLINIC | Age: 76
End: 2021-03-03

## 2021-03-04 ENCOUNTER — HISTORICAL (OUTPATIENT)
Dept: ADMINISTRATIVE | Facility: HOSPITAL | Age: 76
End: 2021-03-04

## 2021-03-04 LAB
BUN SERPL-MCNC: 18.5 MG/DL (ref 9.8–20.1)
CALCIUM SERPL-MCNC: 9.4 MG/DL (ref 8.4–10.2)
CHLORIDE SERPL-SCNC: 96 MMOL/L (ref 98–107)
CO2 SERPL-SCNC: 31 MMOL/L (ref 23–31)
CREAT SERPL-MCNC: 0.86 MG/DL (ref 0.55–1.02)
CREAT/UREA NIT SERPL: 22
GLUCOSE SERPL-MCNC: 99 MG/DL (ref 82–115)
POTASSIUM SERPL-SCNC: 4.3 MMOL/L (ref 3.5–5.1)
SODIUM SERPL-SCNC: 134 MMOL/L (ref 136–145)

## 2021-03-09 ENCOUNTER — HISTORICAL (OUTPATIENT)
Dept: CARDIOLOGY | Facility: HOSPITAL | Age: 76
End: 2021-03-09

## 2021-03-10 ENCOUNTER — PATIENT MESSAGE (OUTPATIENT)
Dept: TRANSPLANT | Facility: CLINIC | Age: 76
End: 2021-03-10

## 2021-03-11 ENCOUNTER — TELEPHONE (OUTPATIENT)
Dept: TRANSPLANT | Facility: CLINIC | Age: 76
End: 2021-03-11

## 2021-03-18 ENCOUNTER — TELEPHONE (OUTPATIENT)
Dept: RESEARCH | Facility: HOSPITAL | Age: 76
End: 2021-03-18

## 2021-03-29 ENCOUNTER — HISTORICAL (OUTPATIENT)
Dept: CARDIOLOGY | Facility: HOSPITAL | Age: 76
End: 2021-03-29

## 2021-04-08 ENCOUNTER — HISTORICAL (OUTPATIENT)
Dept: ADMINISTRATIVE | Facility: HOSPITAL | Age: 76
End: 2021-04-08

## 2021-04-08 LAB
BUN SERPL-MCNC: 17 MG/DL (ref 9.8–20.1)
CALCIUM SERPL-MCNC: 9.8 MG/DL (ref 8.4–10.2)
CHLORIDE SERPL-SCNC: 97 MMOL/L (ref 98–107)
CO2 SERPL-SCNC: 32 MMOL/L (ref 23–31)
CREAT SERPL-MCNC: 0.86 MG/DL (ref 0.55–1.02)
CREAT/UREA NIT SERPL: 20
GLUCOSE SERPL-MCNC: 93 MG/DL (ref 82–115)
POTASSIUM SERPL-SCNC: 4.3 MMOL/L (ref 3.5–5.1)
SODIUM SERPL-SCNC: 137 MMOL/L (ref 136–145)

## 2021-04-15 ENCOUNTER — HISTORICAL (OUTPATIENT)
Dept: RESPIRATORY THERAPY | Facility: HOSPITAL | Age: 76
End: 2021-04-15

## 2021-04-15 ENCOUNTER — HISTORICAL (OUTPATIENT)
Dept: PULMONOLOGY | Facility: HOSPITAL | Age: 76
End: 2021-04-15

## 2021-04-28 ENCOUNTER — HISTORICAL (OUTPATIENT)
Dept: CARDIOLOGY | Facility: HOSPITAL | Age: 76
End: 2021-04-28

## 2021-05-03 ENCOUNTER — HISTORICAL (OUTPATIENT)
Dept: ADMINISTRATIVE | Facility: HOSPITAL | Age: 76
End: 2021-05-03

## 2021-05-03 LAB
BUN SERPL-MCNC: 24.9 MG/DL (ref 9.8–20.1)
CALCIUM SERPL-MCNC: 9.6 MG/DL (ref 8.4–10.2)
CHLORIDE SERPL-SCNC: 100 MMOL/L (ref 98–107)
CO2 SERPL-SCNC: 27 MMOL/L (ref 23–31)
CREAT SERPL-MCNC: 1.07 MG/DL (ref 0.55–1.02)
CREAT/UREA NIT SERPL: 23
CRP SERPL-MCNC: <0.1 MG/DL
ERYTHROCYTE [SEDIMENTATION RATE] IN BLOOD: 14 MM/HR (ref 0–20)
GLUCOSE SERPL-MCNC: 94 MG/DL (ref 82–115)
POTASSIUM SERPL-SCNC: 4.3 MMOL/L (ref 3.5–5.1)
SODIUM SERPL-SCNC: 137 MMOL/L (ref 136–145)

## 2021-06-16 ENCOUNTER — HISTORICAL (OUTPATIENT)
Dept: CARDIOLOGY | Facility: HOSPITAL | Age: 76
End: 2021-06-16

## 2021-07-21 ENCOUNTER — HISTORICAL (OUTPATIENT)
Dept: CARDIOLOGY | Facility: HOSPITAL | Age: 76
End: 2021-07-21

## 2021-07-28 ENCOUNTER — HISTORICAL (OUTPATIENT)
Dept: RESPIRATORY THERAPY | Facility: HOSPITAL | Age: 76
End: 2021-07-28

## 2021-07-28 ENCOUNTER — HISTORICAL (OUTPATIENT)
Dept: CARDIOLOGY | Facility: HOSPITAL | Age: 76
End: 2021-07-28

## 2022-01-01 ENCOUNTER — ANTI-COAG VISIT (OUTPATIENT)
Dept: CARDIOLOGY | Facility: HOSPITAL | Age: 77
End: 2022-01-01
Payer: MEDICARE

## 2022-01-01 ENCOUNTER — HISTORICAL (OUTPATIENT)
Dept: CARDIOLOGY | Facility: HOSPITAL | Age: 77
End: 2022-01-01

## 2022-01-01 ENCOUNTER — LAB VISIT (OUTPATIENT)
Dept: LAB | Facility: HOSPITAL | Age: 77
End: 2022-01-01
Attending: INTERNAL MEDICINE
Payer: MEDICARE

## 2022-01-01 ENCOUNTER — HISTORICAL (OUTPATIENT)
Dept: RADIOLOGY | Facility: HOSPITAL | Age: 77
End: 2022-01-01

## 2022-01-01 ENCOUNTER — HISTORICAL (OUTPATIENT)
Dept: CARDIOLOGY | Facility: HOSPITAL | Age: 77
End: 2022-01-01
Payer: MEDICARE

## 2022-01-01 ENCOUNTER — HOSPITAL ENCOUNTER (INPATIENT)
Facility: HOSPITAL | Age: 77
LOS: 11 days | DRG: 196 | End: 2022-08-04
Attending: EMERGENCY MEDICINE | Admitting: INTERNAL MEDICINE
Payer: MEDICARE

## 2022-01-01 ENCOUNTER — HISTORICAL (OUTPATIENT)
Dept: ADMINISTRATIVE | Facility: HOSPITAL | Age: 77
End: 2022-01-01
Payer: MEDICARE

## 2022-01-01 ENCOUNTER — HISTORICAL (OUTPATIENT)
Dept: ADMINISTRATIVE | Facility: HOSPITAL | Age: 77
End: 2022-01-01

## 2022-01-01 ENCOUNTER — OFFICE VISIT (OUTPATIENT)
Dept: INTERNAL MEDICINE | Facility: CLINIC | Age: 77
End: 2022-01-01
Payer: MEDICARE

## 2022-01-01 ENCOUNTER — HISTORICAL (OUTPATIENT)
Dept: RESPIRATORY THERAPY | Facility: HOSPITAL | Age: 77
End: 2022-01-01

## 2022-01-01 ENCOUNTER — PATIENT OUTREACH (OUTPATIENT)
Dept: ADMINISTRATIVE | Facility: HOSPITAL | Age: 77
End: 2022-01-01
Payer: MEDICARE

## 2022-01-01 VITALS
SYSTOLIC BLOOD PRESSURE: 157 MMHG | OXYGEN SATURATION: 63 % | BODY MASS INDEX: 32.78 KG/M2 | DIASTOLIC BLOOD PRESSURE: 64 MMHG | HEART RATE: 100 BPM | RESPIRATION RATE: 27 BRPM | TEMPERATURE: 98 F | HEIGHT: 63 IN | WEIGHT: 185 LBS

## 2022-01-01 VITALS
WEIGHT: 186 LBS | RESPIRATION RATE: 20 BRPM | HEART RATE: 65 BPM | DIASTOLIC BLOOD PRESSURE: 82 MMHG | HEIGHT: 63 IN | SYSTOLIC BLOOD PRESSURE: 130 MMHG | OXYGEN SATURATION: 97 % | TEMPERATURE: 97 F | BODY MASS INDEX: 32.96 KG/M2

## 2022-01-01 VITALS
SYSTOLIC BLOOD PRESSURE: 150 MMHG | OXYGEN SATURATION: 96 % | DIASTOLIC BLOOD PRESSURE: 75 MMHG | HEIGHT: 64 IN | BODY MASS INDEX: 32.14 KG/M2 | WEIGHT: 188.25 LBS

## 2022-01-01 DIAGNOSIS — J96.01 ACUTE HYPOXEMIC RESPIRATORY FAILURE: Primary | ICD-10-CM

## 2022-01-01 DIAGNOSIS — R06.09 OTHER FORMS OF DYSPNEA: ICD-10-CM

## 2022-01-01 DIAGNOSIS — Z86.79 ATRIAL FIBRILLATION, CURRENTLY IN SINUS RHYTHM: ICD-10-CM

## 2022-01-01 DIAGNOSIS — I27.9 CHRONIC CARDIOPULMONARY DISEASE: ICD-10-CM

## 2022-01-01 DIAGNOSIS — I82.90 DEEP VEIN THROMBOSIS (DVT) OF NON-EXTREMITY VEIN, UNSPECIFIED CHRONICITY: ICD-10-CM

## 2022-01-01 DIAGNOSIS — R60.9 EDEMA, UNSPECIFIED TYPE: Primary | ICD-10-CM

## 2022-01-01 DIAGNOSIS — J84.9 INTERSTITIAL LUNG DISEASE: ICD-10-CM

## 2022-01-01 DIAGNOSIS — R06.02 SHORTNESS OF BREATH: ICD-10-CM

## 2022-01-01 DIAGNOSIS — K21.9 GASTROESOPHAGEAL REFLUX DISEASE, UNSPECIFIED WHETHER ESOPHAGITIS PRESENT: ICD-10-CM

## 2022-01-01 DIAGNOSIS — I10 ESSENTIAL HYPERTENSION: ICD-10-CM

## 2022-01-01 DIAGNOSIS — J84.9 ILD (INTERSTITIAL LUNG DISEASE): ICD-10-CM

## 2022-01-01 DIAGNOSIS — Z79.899 ENCOUNTER FOR LONG-TERM (CURRENT) USE OF OTHER MEDICATIONS: Primary | ICD-10-CM

## 2022-01-01 DIAGNOSIS — I48.20 CHRONIC ATRIAL FIBRILLATION: Primary | ICD-10-CM

## 2022-01-01 DIAGNOSIS — J84.10 POSTINFLAMMATORY PULMONARY FIBROSIS: Primary | ICD-10-CM

## 2022-01-01 DIAGNOSIS — D68.51 FACTOR V LEIDEN MUTATION: ICD-10-CM

## 2022-01-01 DIAGNOSIS — D89.89 ANTISYNTHETASE SYNDROME: ICD-10-CM

## 2022-01-01 DIAGNOSIS — R06.03 SIGNS AND SYMPTOMS OF SEVERE RESPIRATORY DISTRESS: ICD-10-CM

## 2022-01-01 DIAGNOSIS — I10 ESSENTIAL HYPERTENSION: Primary | ICD-10-CM

## 2022-01-01 DIAGNOSIS — R79.1 SUPRATHERAPEUTIC INR: ICD-10-CM

## 2022-01-01 LAB
1,3 BETA GLUCAN SER-MCNC: <31 PG/ML
ABS NEUT (OLG): 1.16 (ref 2.1–9.2)
ABS NEUT (OLG): 1.45 (ref 2.1–9.2)
ABS NEUT (OLG): 24 X10(3)/MCL (ref 2.1–9.2)
ABS NEUT (OLG): 24.5 X10(3)/MCL (ref 2.1–9.2)
ABS NEUT (OLG): 26.36 X10(3)/MCL (ref 2.1–9.2)
ALBUMIN % SPEP (OHS): 46.62 (ref 48.1–59.5)
ALBUMIN SERPL BCP-MCNC: 3.7 G/DL
ALBUMIN SERPL-MCNC: 2.6 GM/DL (ref 3.4–4.8)
ALBUMIN SERPL-MCNC: 2.8 GM/DL (ref 3.4–4.8)
ALBUMIN SERPL-MCNC: 2.9 GM/DL (ref 3.4–4.8)
ALBUMIN SERPL-MCNC: 3.3 G/DL (ref 3.4–4.8)
ALBUMIN SERPL-MCNC: 3.4 GM/DL (ref 3.4–4.8)
ALBUMIN SERPL-MCNC: 3.4 GM/DL (ref 3.4–4.8)
ALBUMIN SERPL-MCNC: 3.6 GM/DL (ref 3.4–4.8)
ALBUMIN/GLOB SERPL: 0.8 RATIO (ref 1.1–2)
ALBUMIN/GLOB SERPL: 0.8 RATIO (ref 1.1–2)
ALBUMIN/GLOB SERPL: 1 RATIO (ref 1.1–2)
ALBUMIN/GLOB SERPL: 1 RATIO (ref 1.1–2)
ALBUMIN/GLOB SERPL: 1.1 {RATIO} (ref 1.1–2)
ALBUMIN/GLOB SERPL: 1.2 RATIO
ALBUMIN/GLOB SERPL: 1.4 RATIO (ref 1.1–2)
ALBUMIN/GLOB SERPL: 1.4 RATIO (ref 1.1–2)
ALP SERPL-CCNC: 103 UNIT/L (ref 40–150)
ALP SERPL-CCNC: 48 UNIT/L (ref 40–150)
ALP SERPL-CCNC: 56 UNIT/L (ref 40–150)
ALP SERPL-CCNC: 72 U/L (ref 40–150)
ALP SERPL-CCNC: 91 UNIT/L (ref 40–150)
ALP SERPL-CCNC: 92 UNIT/L (ref 40–150)
ALP SERPL-CCNC: 95 UNIT/L (ref 40–150)
ALPHA 1 GLOB (OHS): 0.41 GM/DL (ref 0–0.4)
ALPHA 1 GLOB% (OHS): 6.04 (ref 2.3–4.9)
ALPHA 2 GLOB % (OHS): 15.98 (ref 6.9–13)
ALPHA 2 GLOB (OHS): 1.09 GM/DL (ref 0.4–1)
ALT SERPL-CCNC: 11 U/L (ref 0–55)
ALT SERPL-CCNC: 15 UNIT/L (ref 0–55)
ALT SERPL-CCNC: 17 UNIT/L (ref 0–55)
ALT SERPL-CCNC: 20 UNIT/L (ref 0–55)
ALT SERPL-CCNC: 21 UNIT/L (ref 0–55)
ALT SERPL-CCNC: 29 UNIT/L (ref 0–55)
ALT SERPL-CCNC: 30 UNIT/L (ref 0–55)
ANION GAP SERPL CALC-SCNC: 10 MEQ/L
ANION GAP SERPL CALC-SCNC: 12 MEQ/L
ANION GAP SERPL CALC-SCNC: 12 MEQ/L
ANION GAP SERPL CALC-SCNC: 6 MEQ/L
ANION GAP SERPL CALC-SCNC: 6 MEQ/L
ANION GAP SERPL CALC-SCNC: 8 MEQ/L
ANION GAP SERPL CALC-SCNC: 9 MEQ/L
ANION GAP SERPL CALC-SCNC: 9 MEQ/L
ANISOCYTOSIS BLD QL SMEAR: ABNORMAL
APTT PPP: 62.2 SECONDS (ref 23.2–33.7)
APTT PPP: 75.6 SECONDS (ref 23.2–33.7)
AR (1,3)-BETA-D-GLUCAN INTERPRETATION: NEGATIVE
AST SERPL-CCNC: 17 UNIT/L (ref 5–34)
AST SERPL-CCNC: 18 UNIT/L (ref 5–34)
AST SERPL-CCNC: 20 U/L (ref 5–34)
AST SERPL-CCNC: 26 UNIT/L (ref 5–34)
AST SERPL-CCNC: 36 UNIT/L (ref 5–34)
AST SERPL-CCNC: 40 UNIT/L (ref 5–34)
AST SERPL-CCNC: 45 UNIT/L (ref 5–34)
B PARAP IS1001 DNA CT SPEC QN NAA+PROBE: NOT DETECTED
B PERT+PARAP PTXS1 CT SPEC QN NAA+PROBE: NOT DETECTED
BACTERIA BLD CULT: NORMAL
BACTERIA BLD CULT: NORMAL
BASOPHILS # BLD AUTO: 0 10*3/UL (ref 0–0.2)
BASOPHILS # BLD AUTO: 0 10*3/UL (ref 0–0.2)
BASOPHILS # BLD AUTO: 0.01 X10(3)/MCL (ref 0–0.2)
BASOPHILS # BLD AUTO: 0.02 X10(3)/MCL (ref 0–0.2)
BASOPHILS # BLD AUTO: 0.03 X10(3)/MCL (ref 0–0.2)
BASOPHILS # BLD AUTO: 0.05 X10(3)/MCL (ref 0–0.2)
BASOPHILS NFR BLD AUTO: 0.1 %
BASOPHILS NFR BLD AUTO: 0.2 %
BASOPHILS NFR BLD AUTO: 0.8 %
BASOPHILS NFR BLD AUTO: 0.8 %
BASOPHILS NFR BLD AUTO: 1 %
BASOPHILS NFR BLD AUTO: 1 %
BETA GLOB (OHS): 1.31 GM/DL (ref 0.7–1.3)
BETA GLOB% (OHS): 19.25 (ref 13.8–19.7)
BILIRUB SERPL-MCNC: 0.6 MG/DL
BILIRUBIN DIRECT+TOT PNL SERPL-MCNC: 0.2 (ref 0–0.5)
BILIRUBIN DIRECT+TOT PNL SERPL-MCNC: 0.3 MG/DL
BILIRUBIN DIRECT+TOT PNL SERPL-MCNC: 0.4 (ref 0–0.8)
BILIRUBIN DIRECT+TOT PNL SERPL-MCNC: 0.4 MG/DL
BILIRUBIN DIRECT+TOT PNL SERPL-MCNC: 0.5 MG/DL
BILIRUBIN DIRECT+TOT PNL SERPL-MCNC: 0.5 MG/DL
BILIRUBIN DIRECT+TOT PNL SERPL-MCNC: 0.6 MG/DL
BILIRUBIN DIRECT+TOT PNL SERPL-MCNC: 0.8 MG/DL
BNP BLD-MCNC: 171.4 PG/ML
BNP BLD-MCNC: 175.5 PG/ML
BNP BLD-MCNC: 52.8 PG/ML
BUN SERPL-MCNC: 17.6 MG/DL (ref 9.8–20.1)
BUN SERPL-MCNC: 17.6 MG/DL (ref 9.8–20.1)
BUN SERPL-MCNC: 18.3 MG/DL (ref 9.8–20.1)
BUN SERPL-MCNC: 19 MG/DL (ref 9.8–20.1)
BUN SERPL-MCNC: 19.6 MG/DL (ref 9.8–20.1)
BUN SERPL-MCNC: 22.8 MG/DL (ref 9.8–20.1)
BUN SERPL-MCNC: 29.7 MG/DL (ref 9.8–20.1)
BUN SERPL-MCNC: 32.6 MG/DL (ref 9.8–20.1)
BUN SERPL-MCNC: 35.7 MG/DL (ref 9.8–20.1)
BUN SERPL-MCNC: 39.8 MG/DL (ref 9.8–20.1)
BUN SERPL-MCNC: 41.3 MG/DL (ref 9.8–20.1)
BUN SERPL-MCNC: 45.5 MG/DL (ref 9.8–20.1)
BUN SERPL-MCNC: 47.5 MG/DL (ref 9.8–20.1)
BUN SERPL-MCNC: 47.6 MG/DL (ref 9.8–20.1)
BUN SERPL-MCNC: 50.9 MG/DL (ref 9.8–20.1)
CALCIUM SERPL-MCNC: 8.5 MG/DL (ref 8.4–10.2)
CALCIUM SERPL-MCNC: 8.6 MG/DL (ref 8.4–10.2)
CALCIUM SERPL-MCNC: 8.6 MG/DL (ref 8.4–10.2)
CALCIUM SERPL-MCNC: 8.7 MG/DL (ref 8.4–10.2)
CALCIUM SERPL-MCNC: 8.7 MG/DL (ref 8.4–10.2)
CALCIUM SERPL-MCNC: 8.8 MG/DL (ref 8.4–10.2)
CALCIUM SERPL-MCNC: 8.8 MG/DL (ref 8.4–10.2)
CALCIUM SERPL-MCNC: 8.9 MG/DL (ref 8.4–10.2)
CALCIUM SERPL-MCNC: 9 MG/DL (ref 8.4–10.2)
CALCIUM SERPL-MCNC: 9.1 MG/DL (ref 8.4–10.2)
CALCIUM SERPL-MCNC: 9.1 MG/DL (ref 8.4–10.2)
CALCIUM SERPL-MCNC: 9.1 MG/DL (ref 8.7–10.5)
CALCIUM SERPL-MCNC: 9.2 MG/DL (ref 8.4–10.2)
CALCIUM SERPL-MCNC: 9.3 MG/DL (ref 8.4–10.2)
CALCIUM SERPL-MCNC: 9.5 MG/DL (ref 8.4–10.2)
CHLAMYDIA SP IGG+IGM PNL TITR SER IF: NOT DETECTED
CHLORIDE SERPL-SCNC: 100 MMOL/L (ref 98–107)
CHLORIDE SERPL-SCNC: 101 MMOL/L (ref 98–107)
CHLORIDE SERPL-SCNC: 102 MMOL/L (ref 98–107)
CHLORIDE SERPL-SCNC: 103 MMOL/L (ref 98–107)
CHLORIDE SERPL-SCNC: 106 MMOL/L (ref 98–107)
CHLORIDE SERPL-SCNC: 93 MMOL/L (ref 98–107)
CHLORIDE SERPL-SCNC: 95 MMOL/L (ref 98–107)
CHLORIDE SERPL-SCNC: 95 MMOL/L (ref 98–107)
CHLORIDE SERPL-SCNC: 96 MMOL/L (ref 98–107)
CHLORIDE SERPL-SCNC: 96 MMOL/L (ref 98–107)
CHLORIDE SERPL-SCNC: 97 MMOL/L (ref 98–107)
CHLORIDE SERPL-SCNC: 99 MMOL/L (ref 98–107)
CHLORIDE SERPL-SCNC: 99 MMOL/L (ref 98–107)
CHOLEST SERPL-MCNC: 180 MG/DL
CHOLEST SERPL-MCNC: 204 MG/DL
CHOLEST/HDLC SERPL: 3 {RATIO} (ref 0–5)
CHOLEST/HDLC SERPL: 4 {RATIO} (ref 0–5)
CMV DNA SERPL NAA+PROBE-ACNC: 37 IU/ML
CO2 SERPL-SCNC: 21 MMOL/L (ref 23–31)
CO2 SERPL-SCNC: 21 MMOL/L (ref 23–31)
CO2 SERPL-SCNC: 22 MMOL/L (ref 23–31)
CO2 SERPL-SCNC: 24 MMOL/L (ref 23–31)
CO2 SERPL-SCNC: 25 MMOL/L (ref 23–31)
CO2 SERPL-SCNC: 26 MMOL/L (ref 23–31)
CO2 SERPL-SCNC: 26 MMOL/L (ref 23–31)
CO2 SERPL-SCNC: 27 MMOL/L (ref 23–31)
CO2 SERPL-SCNC: 28 MMOL/L (ref 23–31)
CO2 SERPL-SCNC: 28 MMOL/L (ref 23–31)
CO2 SERPL-SCNC: 30 MMOL/L (ref 23–31)
CO2 SERPL-SCNC: 32 MMOL/L (ref 23–31)
CO2 SERPL-SCNC: 32 MMOL/L (ref 23–31)
CORRECTED TEMPERATURE (PCO2): 38 MMHG (ref 35–45)
CORRECTED TEMPERATURE (PH): 7.44 (ref 7.35–7.45)
CORRECTED TEMPERATURE (PO2): 61 MMHG (ref 80–100)
CREAT SERPL-MCNC: 0.79 MG/DL (ref 0.55–1.02)
CREAT SERPL-MCNC: 0.81 MG/DL (ref 0.55–1.02)
CREAT SERPL-MCNC: 0.81 MG/DL (ref 0.55–1.02)
CREAT SERPL-MCNC: 0.83 MG/DL (ref 0.55–1.02)
CREAT SERPL-MCNC: 0.86 MG/DL (ref 0.55–1.02)
CREAT SERPL-MCNC: 0.89 MG/DL (ref 0.55–1.02)
CREAT SERPL-MCNC: 1.07 MG/DL (ref 0.55–1.02)
CREAT SERPL-MCNC: 1.07 MG/DL (ref 0.55–1.02)
CREAT SERPL-MCNC: 1.09 MG/DL (ref 0.55–1.02)
CREAT SERPL-MCNC: 1.16 MG/DL (ref 0.55–1.02)
CREAT SERPL-MCNC: 1.16 MG/DL (ref 0.55–1.02)
CREAT SERPL-MCNC: 1.32 MG/DL (ref 0.55–1.02)
CREAT SERPL-MCNC: 1.34 MG/DL (ref 0.55–1.02)
CREAT/UREA NIT SERPL: 13
CREAT/UREA NIT SERPL: 22
CREAT/UREA NIT SERPL: 30
CREAT/UREA NIT SERPL: 34
CREAT/UREA NIT SERPL: 46
CREAT/UREA NIT SERPL: 59
CREAT/UREA NIT SERPL: 60
CREAT/UREA NIT SERPL: 61
CRP SERPL-MCNC: 0.28 MG/L
CRP SERPL-MCNC: 1.4 MG/L
CRP SERPL-MCNC: 1.9 MG/L
CRP SERPL-MCNC: 53.8 MG/L
CRP SERPL-MCNC: <0.1 MG/L
CRP SERPL-MCNC: <0.1 MG/L
CTP QC/QA: YES
EOSINOPHIL # BLD AUTO: 0 X10(3)/MCL (ref 0–0.9)
EOSINOPHIL # BLD AUTO: 0.12 X10(3)/MCL (ref 0–0.9)
EOSINOPHIL # BLD AUTO: 0.18 X10(3)/MCL (ref 0–0.9)
EOSINOPHIL # BLD AUTO: 0.2 10*3/UL (ref 0–0.9)
EOSINOPHIL # BLD AUTO: 0.2 10*3/UL (ref 0–0.9)
EOSINOPHIL # BLD AUTO: 0.23 X10(3)/MCL (ref 0–0.9)
EOSINOPHIL NFR BLD AUTO: 0 %
EOSINOPHIL NFR BLD AUTO: 1.9 %
EOSINOPHIL NFR BLD AUTO: 1.9 %
EOSINOPHIL NFR BLD AUTO: 2.8 %
EOSINOPHIL NFR BLD AUTO: 7 %
EOSINOPHIL NFR BLD AUTO: 8 %
ERYTHROCYTE [DISTWIDTH] IN BLOOD BY AUTOMATED COUNT: 12.5 % (ref 11.5–17)
ERYTHROCYTE [DISTWIDTH] IN BLOOD BY AUTOMATED COUNT: 12.5 % (ref 11.5–17)
ERYTHROCYTE [DISTWIDTH] IN BLOOD BY AUTOMATED COUNT: 12.6 % (ref 11.5–17)
ERYTHROCYTE [DISTWIDTH] IN BLOOD BY AUTOMATED COUNT: 12.7 % (ref 11.5–17)
ERYTHROCYTE [DISTWIDTH] IN BLOOD BY AUTOMATED COUNT: 12.7 % (ref 11.5–17)
ERYTHROCYTE [DISTWIDTH] IN BLOOD BY AUTOMATED COUNT: 12.8 % (ref 11.5–17)
ERYTHROCYTE [DISTWIDTH] IN BLOOD BY AUTOMATED COUNT: 12.9 % (ref 11.5–17)
ERYTHROCYTE [DISTWIDTH] IN BLOOD BY AUTOMATED COUNT: 12.9 % (ref 11.5–17)
ERYTHROCYTE [DISTWIDTH] IN BLOOD BY AUTOMATED COUNT: 13 % (ref 11.5–17)
ERYTHROCYTE [DISTWIDTH] IN BLOOD BY AUTOMATED COUNT: 13 % (ref 11.5–17)
ERYTHROCYTE [DISTWIDTH] IN BLOOD BY AUTOMATED COUNT: 13.2 % (ref 11.5–17)
ERYTHROCYTE [DISTWIDTH] IN BLOOD BY AUTOMATED COUNT: 13.9 % (ref 11.5–17)
ERYTHROCYTE [DISTWIDTH] IN BLOOD BY AUTOMATED COUNT: 15.4 % (ref 11.5–17)
ERYTHROCYTE [DISTWIDTH] IN BLOOD BY AUTOMATED COUNT: 16.8 % (ref 11.5–17)
ERYTHROCYTE [DISTWIDTH] IN BLOOD BY AUTOMATED COUNT: 18 % (ref 11.5–17)
ERYTHROCYTE [SEDIMENTATION RATE] IN BLOOD: 10 MM/H (ref 0–20)
ERYTHROCYTE [SEDIMENTATION RATE] IN BLOOD: 18 MM/H (ref 0–20)
ERYTHROCYTE [SEDIMENTATION RATE] IN BLOOD: 18 MM/HR (ref 0–20)
ERYTHROCYTE [SEDIMENTATION RATE] IN BLOOD: 25 MM/H (ref 0–20)
ERYTHROCYTE [SEDIMENTATION RATE] IN BLOOD: 25 MM/HR (ref 0–20)
ERYTHROCYTE [SEDIMENTATION RATE] IN BLOOD: 26 MM/HR (ref 0–20)
ERYTHROCYTE [SEDIMENTATION RATE] IN BLOOD: 30 MM/HR (ref 0–20)
ERYTHROCYTE [SEDIMENTATION RATE] IN BLOOD: 58 MM/HR (ref 0–20)
FLUAV AG UPPER RESP QL IA.RAPID: NOT DETECTED
FLUAV AG UPPER RESP QL IA.RAPID: NOT DETECTED
FLUBV AG UPPER RESP QL IA.RAPID: NOT DETECTED
FLUBV AG UPPER RESP QL IA.RAPID: NOT DETECTED
G6PD RBC-CCNT: 10.8 U/G HB (ref 8–11.9)
GAMMA GLOBULIN % (OHS): 12.11 (ref 10.1–21.9)
GAMMA GLOBULIN (OHS): 0.82 GM/DL (ref 0.4–1.8)
GLOBULIN SER-MCNC: 2.4 GM/DL (ref 2.4–3.5)
GLOBULIN SER-MCNC: 2.6 GM/DL (ref 2.4–3.5)
GLOBULIN SER-MCNC: 2.9 GM/DL (ref 2.4–3.5)
GLOBULIN SER-MCNC: 3.1 G/DL (ref 2.4–3.5)
GLOBULIN SER-MCNC: 3.1 GM/DL
GLOBULIN SER-MCNC: 3.3 GM/DL (ref 2.4–3.5)
GLOBULIN SER-MCNC: 3.4 GM/DL (ref 2.4–3.5)
GLOBULIN SER-MCNC: 3.5 GM/DL (ref 2.4–3.5)
GLUCOSE SERPL-MCNC: 100 MG/DL (ref 82–115)
GLUCOSE SERPL-MCNC: 103 MG/DL (ref 82–115)
GLUCOSE SERPL-MCNC: 113 MG/DL (ref 82–115)
GLUCOSE SERPL-MCNC: 115 MG/DL (ref 82–115)
GLUCOSE SERPL-MCNC: 118 MG/DL (ref 82–115)
GLUCOSE SERPL-MCNC: 139 MG/DL (ref 82–115)
GLUCOSE SERPL-MCNC: 140 MG/DL (ref 82–115)
GLUCOSE SERPL-MCNC: 144 MG/DL (ref 82–115)
GLUCOSE SERPL-MCNC: 145 MG/DL (ref 82–115)
GLUCOSE SERPL-MCNC: 148 MG/DL (ref 82–115)
GLUCOSE SERPL-MCNC: 149 MG/DL (ref 82–115)
GLUCOSE SERPL-MCNC: 150 MG/DL (ref 82–115)
GLUCOSE SERPL-MCNC: 182 MG/DL (ref 82–115)
GLUCOSE SERPL-MCNC: 87 MG/DL (ref 82–115)
GLUCOSE SERPL-MCNC: 92 MG/DL (ref 82–115)
HADV DNA NPH QL NAA+NON-PROBE: NOT DETECTED
HCO3 UR-SCNC: 25.8 MMOL/L
HCOV 229E+OC43 RNA NPH QL NAA+PROBE: NOT DETECTED
HCOV HKU1 RNA SPEC QL NAA+PROBE: NOT DETECTED
HCOV NL63 RNA SPEC QL NAA+PROBE: NOT DETECTED
HCOV OC43 RNA SPEC QL NAA+PROBE: NOT DETECTED
HCT VFR BLD AUTO: 26.7 % (ref 37–47)
HCT VFR BLD AUTO: 27.4 % (ref 37–47)
HCT VFR BLD AUTO: 27.6 % (ref 37–47)
HCT VFR BLD AUTO: 27.7 % (ref 37–47)
HCT VFR BLD AUTO: 28.8 % (ref 37–47)
HCT VFR BLD AUTO: 29.4 % (ref 37–47)
HCT VFR BLD AUTO: 30.1 % (ref 37–47)
HCT VFR BLD AUTO: 30.2 % (ref 37–47)
HCT VFR BLD AUTO: 30.9 % (ref 37–47)
HCT VFR BLD AUTO: 31 % (ref 37–47)
HCT VFR BLD AUTO: 31.4 % (ref 37–47)
HCT VFR BLD AUTO: 31.5 % (ref 37–47)
HCT VFR BLD AUTO: 31.9 % (ref 37–47)
HCT VFR BLD AUTO: 32 % (ref 37–47)
HCT VFR BLD AUTO: 32.1 % (ref 37–47)
HCT VFR BLD AUTO: 32.5 % (ref 37–47)
HCT VFR BLD AUTO: 32.5 % (ref 37–47)
HCT VFR BLD AUTO: 32.7 % (ref 37–47)
HCT VFR BLD AUTO: 33.2 % (ref 37–47)
HCT VFR BLD AUTO: 35.7 % (ref 37–47)
HDLC SERPL-MCNC: 51 MG/DL (ref 35–60)
HDLC SERPL-MCNC: 72 MG/DL (ref 35–60)
HEMOCCULT STL QL IA: NEGATIVE
HEMOLYSIS INTERF INDEX SERPL-ACNC: 19
HGB BLD-MCNC: 10 GM/DL (ref 12–16)
HGB BLD-MCNC: 10.2 G/DL (ref 12–16)
HGB BLD-MCNC: 10.3 GM/DL (ref 12–16)
HGB BLD-MCNC: 10.4 GM/DL (ref 12–16)
HGB BLD-MCNC: 10.5 GM/DL (ref 12–16)
HGB BLD-MCNC: 10.6 GM/DL (ref 12–16)
HGB BLD-MCNC: 10.7 G/DL (ref 12–16)
HGB BLD-MCNC: 10.7 G/DL (ref 12–16)
HGB BLD-MCNC: 10.7 GM/DL (ref 12–16)
HGB BLD-MCNC: 10.9 GM/DL (ref 12–16)
HGB BLD-MCNC: 11.1 G/DL (ref 12–16)
HGB BLD-MCNC: 11.5 GM/DL (ref 12–16)
HGB BLD-MCNC: 8.8 GM/DL (ref 12–16)
HGB BLD-MCNC: 8.9 GM/DL (ref 12–16)
HGB BLD-MCNC: 9.4 GM/DL (ref 12–16)
HGB BLD-MCNC: 9.4 GM/DL (ref 12–16)
HGB BLD-MCNC: 9.5 GM/DL (ref 12–16)
HGB BLD-MCNC: 9.6 G/DL (ref 12–16)
HGB BLD-MCNC: 9.6 GM/DL (ref 12–16)
HGB BLD-MCNC: 9.7 GM/DL (ref 12–16)
HGB BLD-MCNC: 9.8 G/DL (ref 12–16)
HMPV RNA SPEC QL NAA+PROBE: NOT DETECTED
HPIV1 F GENE NPH QL NAA+PROBE: NOT DETECTED
HPIV2 L GENE NPH QL NAA+PROBE: NOT DETECTED
HPIV3 NP GENE NPH QL NAA+PROBE: NOT DETECTED
HPIV4 P GENE NPH QL NAA+PROBE: NOT DETECTED
HYPOCHROMIA BLD QL SMEAR: ABNORMAL
ICTERIC INTERF INDEX SERPL-ACNC: 1
IMM GRANULOCYTES # BLD AUTO: 0.03 X10(3)/MCL (ref 0–0.04)
IMM GRANULOCYTES # BLD AUTO: 0.07 X10(3)/MCL (ref 0–0.02)
IMM GRANULOCYTES # BLD AUTO: 0.08 X10(3)/MCL (ref 0–0.04)
IMM GRANULOCYTES # BLD AUTO: 0.08 X10(3)/MCL (ref 0–0.04)
IMM GRANULOCYTES # BLD AUTO: 0.1 X10(3)/MCL (ref 0–0.04)
IMM GRANULOCYTES # BLD AUTO: 0.19 X10(3)/MCL (ref 0–0.04)
IMM GRANULOCYTES # BLD AUTO: 0.27 X10(3)/MCL (ref 0–0.04)
IMM GRANULOCYTES # BLD AUTO: 0.46 X10(3)/MCL (ref 0–0.04)
IMM GRANULOCYTES # BLD AUTO: 0.47 X10(3)/MCL (ref 0–0.04)
IMM GRANULOCYTES # BLD AUTO: 0.69 X10(3)/MCL (ref 0–0.04)
IMM GRANULOCYTES # BLD AUTO: 0.78 X10(3)/MCL (ref 0–0.04)
IMM GRANULOCYTES # BLD AUTO: 0.81 X10(3)/MCL (ref 0–0.04)
IMM GRANULOCYTES # BLD AUTO: 0.94 X10(3)/MCL (ref 0–0.04)
IMM GRANULOCYTES NFR BLD AUTO: 0.5 %
IMM GRANULOCYTES NFR BLD AUTO: 0.6 %
IMM GRANULOCYTES NFR BLD AUTO: 1 %
IMM GRANULOCYTES NFR BLD AUTO: 1.1 % (ref 0–0.43)
IMM GRANULOCYTES NFR BLD AUTO: 1.5 %
IMM GRANULOCYTES NFR BLD AUTO: 2.5 %
IMM GRANULOCYTES NFR BLD AUTO: 2.6 %
IMM GRANULOCYTES NFR BLD AUTO: 2.8 %
IMM GRANULOCYTES NFR BLD AUTO: 3.1 %
IMM GRANULOCYTES NFR BLD AUTO: 3.4 %
IMM GRANULOCYTES NFR BLD AUTO: 3.5 %
INR BLD: 2.2 (ref 0–1.3)
INR BLD: 2.34 (ref 0–1.3)
INR BLD: 2.54 (ref 0–1.3)
INR BLD: 2.68 (ref 0–1.3)
INR BLD: 2.87 (ref 0–1.3)
INR BLD: 3.04 (ref 0–1.3)
INR BLD: 3.59 (ref 0–1.3)
INR BLD: 3.6 (ref 0–1.3)
INR BLD: 7.52 (ref 0–1.3)
INR BLD: 7.98 (ref 0–1.3)
INR BLD: 8.03 (ref 0–1.3)
INR PPP: 2.2 (ref 0–1.3)
INR PPP: 2.2 (ref 2–3)
INR PPP: 2.3 (ref 2–3)
INR PPP: 2.6 (ref 2–3)
INR PPP: 3 (ref 2–3)
INR PPP: 8.2 (ref 0–1.3)
INSTRUMENT WBC (OLG): 24 X10(3)/MCL
INSTRUMENT WBC (OLG): 25 X10(3)/MCL
INSTRUMENT WBC (OLG): 26.9 X10(3)/MCL
LDH SERPL-CCNC: 1112 U/L (ref 125–220)
LDLC SERPL CALC-MCNC: 104 MG/DL (ref 50–140)
LDLC SERPL CALC-MCNC: 109 MG/DL (ref 50–140)
LIPEMIC INTERF INDEX SERPL-ACNC: 0
LYMPHOCYTES # BLD AUTO: 0.33 X10(3)/MCL (ref 0.6–4.6)
LYMPHOCYTES # BLD AUTO: 0.41 X10(3)/MCL (ref 0.6–4.6)
LYMPHOCYTES # BLD AUTO: 0.42 X10(3)/MCL (ref 0.6–4.6)
LYMPHOCYTES # BLD AUTO: 0.52 X10(3)/MCL (ref 0.6–4.6)
LYMPHOCYTES # BLD AUTO: 0.57 X10(3)/MCL (ref 0.6–4.6)
LYMPHOCYTES # BLD AUTO: 0.61 X10(3)/MCL (ref 0.6–4.6)
LYMPHOCYTES # BLD AUTO: 0.69 X10(3)/MCL (ref 0.6–4.6)
LYMPHOCYTES # BLD AUTO: 0.78 X10(3)/MCL (ref 0.6–4.6)
LYMPHOCYTES # BLD AUTO: 0.8 10*3/UL (ref 0.6–4.6)
LYMPHOCYTES # BLD AUTO: 0.9 10*3/UL (ref 0.6–4.6)
LYMPHOCYTES # BLD AUTO: 1.66 X10(3)/MCL (ref 0.6–4.6)
LYMPHOCYTES # BLD AUTO: 1.96 X10(3)/MCL (ref 0.6–4.6)
LYMPHOCYTES NFR BLD AUTO: 1.6 %
LYMPHOCYTES NFR BLD AUTO: 2 %
LYMPHOCYTES NFR BLD AUTO: 2.3 %
LYMPHOCYTES NFR BLD AUTO: 2.9 %
LYMPHOCYTES NFR BLD AUTO: 25.8 %
LYMPHOCYTES NFR BLD AUTO: 29 %
LYMPHOCYTES NFR BLD AUTO: 3.1 %
LYMPHOCYTES NFR BLD AUTO: 30.6 %
LYMPHOCYTES NFR BLD AUTO: 35 %
LYMPHOCYTES NFR BLD AUTO: 4.1 %
LYMPHOCYTES NFR BLD AUTO: 4.7 %
LYMPHOCYTES NFR BLD AUTO: 6.3 %
LYMPHOCYTES NFR BLD MANUAL: 0.5 X10(3)/MCL
LYMPHOCYTES NFR BLD MANUAL: 2 %
M PNEUMO IGA SER-ACNC: NOT DETECTED
M SPIKE % (OHS): ABNORMAL
M SPIKE (OHS): ABNORMAL
MACROCYTES BLD QL SMEAR: ABNORMAL
MANUAL DIFF? (OHS): NO
MANUAL DIFF? (OHS): NO
MCH RBC QN AUTO: 32.8 PG (ref 27–31)
MCH RBC QN AUTO: 32.9 PG (ref 27–31)
MCH RBC QN AUTO: 33 PG (ref 27–31)
MCH RBC QN AUTO: 33.1 PG (ref 27–31)
MCH RBC QN AUTO: 33.2 PG (ref 27–31)
MCH RBC QN AUTO: 33.5 PG (ref 27–31)
MCH RBC QN AUTO: 33.9 PG (ref 27–31)
MCH RBC QN AUTO: 35.9 PG (ref 27–31)
MCH RBC QN AUTO: 36.2 PG (ref 27–31)
MCH RBC QN AUTO: 37.2 PG (ref 27–31)
MCH RBC QN AUTO: 38.1 PG (ref 27–31)
MCH RBC QN AUTO: 38.9 PG (ref 27–31)
MCH RBC QN AUTO: 39.5 PG (ref 27–31)
MCH RBC QN AUTO: 39.8 PG (ref 27–31)
MCH RBC QN AUTO: 40.2 PG (ref 27–31)
MCHC RBC AUTO-ENTMCNC: 30.8 MG/DL (ref 33–36)
MCHC RBC AUTO-ENTMCNC: 31 MG/DL (ref 33–36)
MCHC RBC AUTO-ENTMCNC: 31.4 MG/DL (ref 33–36)
MCHC RBC AUTO-ENTMCNC: 31.6 MG/DL (ref 33–36)
MCHC RBC AUTO-ENTMCNC: 32 MG/DL (ref 33–36)
MCHC RBC AUTO-ENTMCNC: 32.1 MG/DL (ref 33–36)
MCHC RBC AUTO-ENTMCNC: 32.2 MG/DL (ref 33–36)
MCHC RBC AUTO-ENTMCNC: 32.2 MG/DL (ref 33–36)
MCHC RBC AUTO-ENTMCNC: 32.3 MG/DL (ref 33–36)
MCHC RBC AUTO-ENTMCNC: 32.4 MG/DL (ref 33–36)
MCHC RBC AUTO-ENTMCNC: 32.4 MG/DL (ref 33–36)
MCHC RBC AUTO-ENTMCNC: 32.6 MG/DL (ref 33–36)
MCHC RBC AUTO-ENTMCNC: 32.8 MG/DL (ref 33–36)
MCHC RBC AUTO-ENTMCNC: 33 MG/DL (ref 33–36)
MCHC RBC AUTO-ENTMCNC: 33.5 MG/DL (ref 33–36)
MCHC RBC AUTO-ENTMCNC: 34.1 G/DL (ref 33–36)
MCHC RBC AUTO-ENTMCNC: 35 G/DL (ref 33–36)
MCHC RBC AUTO-ENTMCNC: 35.2 G/DL (ref 33–36)
MCHC RBC AUTO-ENTMCNC: 35.4 G/DL (ref 33–36)
MCHC RBC AUTO-ENTMCNC: 35.5 G/DL (ref 33–36)
MCV RBC AUTO: 100.7 FL (ref 80–94)
MCV RBC AUTO: 101.5 FL (ref 80–94)
MCV RBC AUTO: 102.6 FL (ref 80–94)
MCV RBC AUTO: 103.4 FL (ref 80–94)
MCV RBC AUTO: 103.5 FL (ref 80–94)
MCV RBC AUTO: 103.8 FL (ref 80–94)
MCV RBC AUTO: 103.9 FL (ref 80–94)
MCV RBC AUTO: 104.9 FL (ref 80–94)
MCV RBC AUTO: 105.3 FL (ref 80–94)
MCV RBC AUTO: 105.8 FL (ref 80–94)
MCV RBC AUTO: 106.5 FL (ref 80–94)
MCV RBC AUTO: 106.6 FL (ref 80–94)
MCV RBC AUTO: 107.4 FL (ref 80–94)
MCV RBC AUTO: 110.3 FL (ref 80–94)
MCV RBC AUTO: 110.8 FL (ref 80–94)
MCV RBC AUTO: 111.4 FL (ref 80–94)
MCV RBC AUTO: 111.5 FL (ref 80–94)
MCV RBC AUTO: 113.7 FL (ref 80–94)
MCV RBC AUTO: 114.1 FL (ref 80–94)
MCV RBC AUTO: 114.2 FL (ref 80–94)
METAMYELOCYTES NFR BLD MANUAL: 1 %
MONOCYTES # BLD AUTO: 0.2 10*3/UL (ref 0.1–1.3)
MONOCYTES # BLD AUTO: 0.3 10*3/UL (ref 0.1–1.3)
MONOCYTES # BLD AUTO: 0.36 X10(3)/MCL (ref 0.1–1.3)
MONOCYTES # BLD AUTO: 0.43 X10(3)/MCL (ref 0.1–1.3)
MONOCYTES # BLD AUTO: 0.53 X10(3)/MCL (ref 0.1–1.3)
MONOCYTES # BLD AUTO: 0.54 X10(3)/MCL (ref 0.1–1.3)
MONOCYTES # BLD AUTO: 0.6 X10(3)/MCL (ref 0.1–1.3)
MONOCYTES # BLD AUTO: 0.66 X10(3)/MCL (ref 0.1–1.3)
MONOCYTES # BLD AUTO: 0.68 X10(3)/MCL (ref 0.1–1.3)
MONOCYTES # BLD AUTO: 0.7 X10(3)/MCL (ref 0.1–1.3)
MONOCYTES # BLD AUTO: 0.9 X10(3)/MCL (ref 0.1–1.3)
MONOCYTES # BLD AUTO: 1.16 X10(3)/MCL (ref 0.1–1.3)
MONOCYTES NFR BLD AUTO: 12 %
MONOCYTES NFR BLD AUTO: 2.2 %
MONOCYTES NFR BLD AUTO: 2.7 %
MONOCYTES NFR BLD AUTO: 3.3 %
MONOCYTES NFR BLD AUTO: 3.5 %
MONOCYTES NFR BLD AUTO: 4 %
MONOCYTES NFR BLD AUTO: 4.3 %
MONOCYTES NFR BLD AUTO: 4.9 %
MONOCYTES NFR BLD AUTO: 6.4 %
MONOCYTES NFR BLD AUTO: 8 %
MONOCYTES NFR BLD AUTO: 8.4 %
MONOCYTES NFR BLD AUTO: 9.4 %
MONOCYTES NFR BLD MANUAL: 0.25 X10(3)/MCL (ref 0.1–1.3)
MONOCYTES NFR BLD MANUAL: 0.54 X10(3)/MCL (ref 0.1–1.3)
MONOCYTES NFR BLD MANUAL: 1 %
MONOCYTES NFR BLD MANUAL: 2 %
MRSA PCR SCRN (OHS): NOT DETECTED
MYELOCYTES NFR BLD MANUAL: 1 %
NEUTROPHILS # BLD AUTO: 1.16 10*3/UL (ref 2.1–9.2)
NEUTROPHILS # BLD AUTO: 1.45 10*3/UL (ref 2.1–9.2)
NEUTROPHILS # BLD AUTO: 10.8 X10(3)/MCL (ref 2.1–9.2)
NEUTROPHILS # BLD AUTO: 11.7 X10(3)/MCL (ref 2.1–9.2)
NEUTROPHILS # BLD AUTO: 15.1 X10(3)/MCL (ref 2.1–9.2)
NEUTROPHILS # BLD AUTO: 15.5 X10(3)/MCL (ref 2.1–9.2)
NEUTROPHILS # BLD AUTO: 16.1 X10(3)/MCL (ref 2.1–9.2)
NEUTROPHILS # BLD AUTO: 16.5 X10(3)/MCL (ref 2.1–9.2)
NEUTROPHILS # BLD AUTO: 17.2 X10(3)/MCL (ref 2.1–9.2)
NEUTROPHILS # BLD AUTO: 24.5 X10(3)/MCL (ref 2.1–9.2)
NEUTROPHILS # BLD AUTO: 3.6 X10(3)/MCL (ref 2.1–9.2)
NEUTROPHILS # BLD AUTO: 4 X10(3)/MCL (ref 2.1–9.2)
NEUTROPHILS NFR BLD AUTO: 44 %
NEUTROPHILS NFR BLD AUTO: 54 %
NEUTROPHILS NFR BLD AUTO: 56.2 %
NEUTROPHILS NFR BLD AUTO: 61.7 %
NEUTROPHILS NFR BLD AUTO: 86.7 %
NEUTROPHILS NFR BLD AUTO: 88.6 %
NEUTROPHILS NFR BLD AUTO: 88.9 %
NEUTROPHILS NFR BLD AUTO: 90.6 %
NEUTROPHILS NFR BLD AUTO: 91.2 %
NEUTROPHILS NFR BLD AUTO: 92.3 %
NEUTROPHILS NFR BLD AUTO: 92.9 %
NEUTROPHILS NFR BLD AUTO: 95.1 %
NEUTROPHILS NFR BLD MANUAL: 96 %
NEUTROPHILS NFR BLD MANUAL: 97 %
NEUTROPHILS NFR BLD MANUAL: 99 %
NRBC BLD AUTO-RTO: 0 %
NRBC BLD AUTO-RTO: 0.1 %
OVALOCYTES (OLG): ABNORMAL
PATH REV: NORMAL
PCO2 BLDA: 38 MMHG (ref 35–45)
PH SMN: 7.44 [PH] (ref 7.35–7.45)
PLATELET # BLD AUTO: 110 X10(3)/MCL (ref 130–400)
PLATELET # BLD AUTO: 121 X10(3)/MCL (ref 130–400)
PLATELET # BLD AUTO: 127 10*3/UL (ref 130–400)
PLATELET # BLD AUTO: 131 X10(3)/MCL (ref 130–400)
PLATELET # BLD AUTO: 133 10*3/UL (ref 130–400)
PLATELET # BLD AUTO: 138 10*3/UL (ref 130–400)
PLATELET # BLD AUTO: 141 X10(3)/MCL (ref 130–400)
PLATELET # BLD AUTO: 146 X10(3)/MCL (ref 130–400)
PLATELET # BLD AUTO: 147 X10(3)/MCL (ref 130–400)
PLATELET # BLD AUTO: 149 X10(3)/MCL (ref 130–400)
PLATELET # BLD AUTO: 150 X10(3)/MCL (ref 130–400)
PLATELET # BLD AUTO: 162 X10(3)/MCL (ref 130–400)
PLATELET # BLD AUTO: 168 X10(3)/MCL (ref 130–400)
PLATELET # BLD AUTO: 194 10*3/UL (ref 130–400)
PLATELET # BLD AUTO: 195 X10(3)/MCL (ref 130–400)
PLATELET # BLD AUTO: 196 X10(3)/MCL (ref 130–400)
PLATELET # BLD AUTO: 203 X10(3)/MCL (ref 130–400)
PLATELET # BLD AUTO: 208 10*3/UL (ref 130–400)
PLATELET # BLD AUTO: 225 X10(3)/MCL (ref 130–400)
PLATELET # BLD AUTO: 99 X10(3)/MCL (ref 130–400)
PLATELET # BLD EST: ABNORMAL 10*3/UL
PLATELET # BLD EST: NORMAL 10*3/UL
PMV BLD AUTO: 10 FL (ref 7.4–10.4)
PMV BLD AUTO: 10.1 FL (ref 7.4–10.4)
PMV BLD AUTO: 10.1 FL (ref 7.4–10.4)
PMV BLD AUTO: 10.2 FL (ref 9.4–12.4)
PMV BLD AUTO: 10.4 FL (ref 9.4–12.4)
PMV BLD AUTO: 10.7 FL (ref 7.4–10.4)
PMV BLD AUTO: 10.7 FL (ref 9.4–12.4)
PMV BLD AUTO: 10.9 FL (ref 7.4–10.4)
PMV BLD AUTO: 11.1 FL (ref 7.4–10.4)
PMV BLD AUTO: 11.3 FL (ref 7.4–10.4)
PMV BLD AUTO: 8.8 FL (ref 9.4–12.4)
PMV BLD AUTO: 9.2 FL (ref 7.4–10.4)
PMV BLD AUTO: 9.3 FL (ref 9.4–12.4)
PMV BLD AUTO: 9.5 FL (ref 9.4–12.4)
PMV BLD AUTO: 9.7 FL (ref 7.4–10.4)
PMV BLD AUTO: 9.7 FL (ref 9.4–12.4)
PMV BLD AUTO: 9.8 FL (ref 7.4–10.4)
PMV BLD AUTO: 9.9 FL (ref 9.4–12.4)
PO2 BLDA: 61 MMHG (ref 80–100)
POC BASE DEFICIT: 1.6 MMOL/L (ref -2–2)
POC COHB: 2.1 %
POC IONIZED CALCIUM: 1.2 MMOL/L (ref 1.12–1.23)
POC METHB: 0.6 % (ref 0.4–1.5)
POC O2HB: 90.6 % (ref 94–97)
POC SATURATED O2: 91.9 %
POC TEMPERATURE: 37 °C
POIKILOCYTOSIS BLD QL SMEAR: ABNORMAL
POIKILOCYTOSIS BLD QL SMEAR: ABNORMAL
POS ERR1 (OHS): NORMAL
POS ERR2 (OHS): NORMAL
POS ERR2 (OHS): NORMAL
POTASSIUM BLD-SCNC: 4.2 MMOL/L (ref 3.5–5)
POTASSIUM SERPL-SCNC: 3.7 MMOL/L (ref 3.5–5.1)
POTASSIUM SERPL-SCNC: 3.9 MMOL/L (ref 3.5–5.1)
POTASSIUM SERPL-SCNC: 4.1 MMOL/L (ref 3.5–5.1)
POTASSIUM SERPL-SCNC: 4.1 MMOL/L (ref 3.5–5.1)
POTASSIUM SERPL-SCNC: 4.2 MMOL/L (ref 3.5–5.1)
POTASSIUM SERPL-SCNC: 4.3 MMOL/L (ref 3.5–5.1)
POTASSIUM SERPL-SCNC: 4.5 MMOL/L (ref 3.5–5.1)
POTASSIUM SERPL-SCNC: 4.6 MMOL/L (ref 3.5–5.1)
POTASSIUM SERPL-SCNC: 4.8 MMOL/L (ref 3.5–5.1)
POTASSIUM SERPL-SCNC: 4.8 MMOL/L (ref 3.5–5.1)
POTASSIUM SERPL-SCNC: 5.1 MMOL/L (ref 3.5–5.1)
PROT SERPL-MCNC: 5.8 GM/DL (ref 5.8–7.6)
PROT SERPL-MCNC: 5.8 GM/DL (ref 5.8–7.6)
PROT SERPL-MCNC: 6 GM/DL (ref 5.8–7.6)
PROT SERPL-MCNC: 6.1 GM/DL (ref 5.8–7.6)
PROT SERPL-MCNC: 6.2 GM/DL (ref 5.8–7.6)
PROT SERPL-MCNC: 6.4 G/DL (ref 5.8–7.6)
PROT SERPL-MCNC: 6.8 GM/DL (ref 5.8–7.6)
PROT SERPL-MCNC: 6.9 GM/DL (ref 5.8–7.6)
PROTHROMBIN TIME, POC: 26.5 (ref 2–3)
PROTHROMBIN TIME, POC: 27.5 (ref 2–3)
PROTHROMBIN TIME, POC: 30.7 (ref 2–3)
PROTHROMBIN TIME, POC: 36.5 (ref 2–3)
PROTHROMBIN TIME: 23.8 S (ref 12.5–14.5)
PROTHROMBIN TIME: 24.1 SECONDS (ref 12.5–14.5)
PROTHROMBIN TIME: 25.2 SECONDS (ref 12.5–14.5)
PROTHROMBIN TIME: 26.8 SECONDS (ref 12.5–14.5)
PROTHROMBIN TIME: 28 SECONDS (ref 12.5–14.5)
PROTHROMBIN TIME: 29.5 SECONDS (ref 12.5–14.5)
PROTHROMBIN TIME: 30.8 SECONDS (ref 12.5–14.5)
PROTHROMBIN TIME: 35 SECONDS (ref 12.5–14.5)
PROTHROMBIN TIME: 35.1 SECONDS (ref 12.5–14.5)
PROTHROMBIN TIME: 61.8 SECONDS (ref 12.5–14.5)
PROTHROMBIN TIME: 64.7 SECONDS (ref 12.5–14.5)
PROTHROMBIN TIME: 65.1 SECONDS (ref 12.5–14.5)
PROTHROMBIN TIME: 67.5 S (ref 12.5–14.5)
RBC # BLD AUTO: 2.41 10*6/UL (ref 4.2–5.4)
RBC # BLD AUTO: 2.57 10*6/UL (ref 4.2–5.4)
RBC # BLD AUTO: 2.63 X10(6)/MCL (ref 4.2–5.4)
RBC # BLD AUTO: 2.68 X10(6)/MCL (ref 4.2–5.4)
RBC # BLD AUTO: 2.71 10*6/UL (ref 4.2–5.4)
RBC # BLD AUTO: 2.75 10*6/UL (ref 4.2–5.4)
RBC # BLD AUTO: 2.76 10*6/UL (ref 4.2–5.4)
RBC # BLD AUTO: 2.83 X10(6)/MCL (ref 4.2–5.4)
RBC # BLD AUTO: 2.86 X10(6)/MCL (ref 4.2–5.4)
RBC # BLD AUTO: 2.87 X10(6)/MCL (ref 4.2–5.4)
RBC # BLD AUTO: 2.87 X10(6)/MCL (ref 4.2–5.4)
RBC # BLD AUTO: 2.88 X10(6)/MCL (ref 4.2–5.4)
RBC # BLD AUTO: 2.91 X10(6)/MCL (ref 4.2–5.4)
RBC # BLD AUTO: 2.92 X10(6)/MCL (ref 4.2–5.4)
RBC # BLD AUTO: 3.01 X10(6)/MCL (ref 4.2–5.4)
RBC # BLD AUTO: 3.05 X10(6)/MCL (ref 4.2–5.4)
RBC # BLD AUTO: 3.13 X10(6)/MCL (ref 4.2–5.4)
RBC # BLD AUTO: 3.13 X10(6)/MCL (ref 4.2–5.4)
RBC # BLD AUTO: 3.16 X10(6)/MCL (ref 4.2–5.4)
RBC # BLD AUTO: 3.39 X10(6)/MCL (ref 4.2–5.4)
RBC MORPH BLD: ABNORMAL
RHINOVIRUS RNA SPEC NAA+PROBE: NOT DETECTED
RSV A 5' UTR RNA NPH QL NAA+PROBE: NOT DETECTED
SARS-COV-2 RNA RESP QL NAA+PROBE: NOT DETECTED
SODIUM BLD-SCNC: 127 MMOL/L (ref 137–145)
SODIUM SERPL-SCNC: 126 MMOL/L (ref 136–145)
SODIUM SERPL-SCNC: 127 MMOL/L (ref 136–145)
SODIUM SERPL-SCNC: 129 MMOL/L (ref 136–145)
SODIUM SERPL-SCNC: 129 MMOL/L (ref 136–145)
SODIUM SERPL-SCNC: 133 MMOL/L (ref 136–145)
SODIUM SERPL-SCNC: 134 MMOL/L (ref 136–145)
SODIUM SERPL-SCNC: 135 MMOL/L (ref 136–145)
SODIUM SERPL-SCNC: 136 MMOL/L (ref 136–145)
SODIUM SERPL-SCNC: 138 MMOL/L (ref 136–145)
SODIUM SERPL-SCNC: 139 MMOL/L (ref 136–145)
SODIUM SERPL-SCNC: 140 MMOL/L (ref 136–145)
SODIUM SERPL-SCNC: 141 MMOL/L (ref 136–145)
SPECIMEN SOURCE: ABNORMAL
STOMATOCYTES (OLG): ABNORMAL
TRIGL SERPL-MCNC: 100 MG/DL (ref 37–140)
TRIGL SERPL-MCNC: 139 MG/DL (ref 37–140)
TROPONIN I SERPL-MCNC: 0.03 NG/ML (ref 0–0.04)
TSH SERPL-ACNC: 2.87 M[IU]/L (ref 0.35–4.94)
TSH SERPL-ACNC: 4.65 UIU/ML (ref 0.35–4.94)
VIT B12 SERPL-MCNC: 426 PG/ML (ref 213–816)
VLDLC SERPL CALC-MCNC: 20 MG/DL
VLDLC SERPL CALC-MCNC: 28 MG/DL
WBC # SPEC AUTO: 11 10*3/UL (ref 4.5–11.5)
WBC # SPEC AUTO: 12.4 X10(3)/MCL (ref 4.5–11.5)
WBC # SPEC AUTO: 12.9 X10(3)/MCL (ref 4.5–11.5)
WBC # SPEC AUTO: 16.3 X10(3)/MCL (ref 4.5–11.5)
WBC # SPEC AUTO: 17 X10(3)/MCL (ref 4.5–11.5)
WBC # SPEC AUTO: 18.2 X10(3)/MCL (ref 4.5–11.5)
WBC # SPEC AUTO: 18.2 X10(3)/MCL (ref 4.5–11.5)
WBC # SPEC AUTO: 18.7 X10(3)/MCL (ref 4.5–11.5)
WBC # SPEC AUTO: 2.6 10*3/UL (ref 4.5–11.5)
WBC # SPEC AUTO: 2.7 10*3/UL (ref 4.5–11.5)
WBC # SPEC AUTO: 24 X10(3)/MCL (ref 4.5–11.5)
WBC # SPEC AUTO: 25.3 X10(3)/MCL (ref 4.5–11.5)
WBC # SPEC AUTO: 26.4 X10(3)/MCL (ref 4.5–11.5)
WBC # SPEC AUTO: 26.9 X10(3)/MCL (ref 4.5–11.5)
WBC # SPEC AUTO: 6.1 10*3/UL (ref 4.5–11.5)
WBC # SPEC AUTO: 6.4 X10(3)/MCL (ref 4.5–11.5)
WBC # SPEC AUTO: 6.4 X10(3)/MCL (ref 4.5–11.5)
WBC # SPEC AUTO: 6.6 X10(3)/MCL (ref 4.5–11.5)
WBC # SPEC AUTO: 6.7 10*3/UL (ref 4.5–11.5)
WBC # SPEC AUTO: 8 X10(3)/MCL (ref 4.5–11.5)

## 2022-01-01 PROCEDURE — 25000003 PHARM REV CODE 250: Performed by: EMERGENCY MEDICINE

## 2022-01-01 PROCEDURE — 63600175 PHARM REV CODE 636 W HCPCS: Performed by: INTERNAL MEDICINE

## 2022-01-01 PROCEDURE — 85610 PROTHROMBIN TIME: CPT | Performed by: INTERNAL MEDICINE

## 2022-01-01 PROCEDURE — 80048 BASIC METABOLIC PNL TOTAL CA: CPT | Performed by: INTERNAL MEDICINE

## 2022-01-01 PROCEDURE — 11000001 HC ACUTE MED/SURG PRIVATE ROOM

## 2022-01-01 PROCEDURE — 85025 COMPLETE CBC W/AUTO DIFF WBC: CPT | Performed by: INTERNAL MEDICINE

## 2022-01-01 PROCEDURE — 36415 COLL VENOUS BLD VENIPUNCTURE: CPT | Performed by: NURSE PRACTITIONER

## 2022-01-01 PROCEDURE — 25000003 PHARM REV CODE 250: Performed by: INTERNAL MEDICINE

## 2022-01-01 PROCEDURE — 99232 SBSQ HOSP IP/OBS MODERATE 35: CPT | Mod: ,,, | Performed by: GENERAL PRACTICE

## 2022-01-01 PROCEDURE — 63600175 PHARM REV CODE 636 W HCPCS: Performed by: HOSPITALIST

## 2022-01-01 PROCEDURE — 99214 PR OFFICE/OUTPT VISIT, EST, LEVL IV, 30-39 MIN: ICD-10-PCS | Mod: ,,, | Performed by: INTERNAL MEDICINE

## 2022-01-01 PROCEDURE — 99233 SBSQ HOSP IP/OBS HIGH 50: CPT | Mod: FS,,, | Performed by: GENERAL PRACTICE

## 2022-01-01 PROCEDURE — 94761 N-INVAS EAR/PLS OXIMETRY MLT: CPT

## 2022-01-01 PROCEDURE — 85025 COMPLETE CBC W/AUTO DIFF WBC: CPT

## 2022-01-01 PROCEDURE — 94799 UNLISTED PULMONARY SVC/PX: CPT

## 2022-01-01 PROCEDURE — 99900035 HC TECH TIME PER 15 MIN (STAT)

## 2022-01-01 PROCEDURE — 27000249 HC VAPOTHERM CIRCUIT

## 2022-01-01 PROCEDURE — 94660 CPAP INITIATION&MGMT: CPT

## 2022-01-01 PROCEDURE — S0039 INJECTION, SULFAMETHOXAZOLE: HCPCS | Performed by: NURSE PRACTITIONER

## 2022-01-01 PROCEDURE — 85651 RBC SED RATE NONAUTOMATED: CPT

## 2022-01-01 PROCEDURE — 99223 PR INITIAL HOSPITAL CARE,LEVL III: ICD-10-PCS | Mod: AI,,, | Performed by: INTERNAL MEDICINE

## 2022-01-01 PROCEDURE — 25000003 PHARM REV CODE 250: Performed by: NURSE PRACTITIONER

## 2022-01-01 PROCEDURE — 99233 PR SUBSEQUENT HOSPITAL CARE,LEVL III: ICD-10-PCS | Mod: ,,, | Performed by: GENERAL PRACTICE

## 2022-01-01 PROCEDURE — 99232 SBSQ HOSP IP/OBS MODERATE 35: CPT | Mod: ,,, | Performed by: INTERNAL MEDICINE

## 2022-01-01 PROCEDURE — 87641 MR-STAPH DNA AMP PROBE: CPT | Performed by: NURSE PRACTITIONER

## 2022-01-01 PROCEDURE — 97530 THERAPEUTIC ACTIVITIES: CPT

## 2022-01-01 PROCEDURE — 99232 PR SUBSEQUENT HOSPITAL CARE,LEVL II: ICD-10-PCS | Mod: ,,, | Performed by: INTERNAL MEDICINE

## 2022-01-01 PROCEDURE — 80053 COMPREHEN METABOLIC PANEL: CPT

## 2022-01-01 PROCEDURE — 87798 DETECT AGENT NOS DNA AMP: CPT | Performed by: NURSE PRACTITIONER

## 2022-01-01 PROCEDURE — 84443 ASSAY THYROID STIM HORMONE: CPT

## 2022-01-01 PROCEDURE — 63600175 PHARM REV CODE 636 W HCPCS: Performed by: NURSE PRACTITIONER

## 2022-01-01 PROCEDURE — 27100171 HC OXYGEN HIGH FLOW UP TO 24 HOURS

## 2022-01-01 PROCEDURE — 36415 COLL VENOUS BLD VENIPUNCTURE: CPT | Performed by: GENERAL PRACTICE

## 2022-01-01 PROCEDURE — 80053 COMPREHEN METABOLIC PANEL: CPT | Performed by: INTERNAL MEDICINE

## 2022-01-01 PROCEDURE — 96374 THER/PROPH/DIAG INJ IV PUSH: CPT

## 2022-01-01 PROCEDURE — 85730 THROMBOPLASTIN TIME PARTIAL: CPT | Performed by: NURSE PRACTITIONER

## 2022-01-01 PROCEDURE — 85025 COMPLETE CBC W/AUTO DIFF WBC: CPT | Performed by: EMERGENCY MEDICINE

## 2022-01-01 PROCEDURE — 36415 COLL VENOUS BLD VENIPUNCTURE: CPT | Performed by: INTERNAL MEDICINE

## 2022-01-01 PROCEDURE — 27000207 HC ISOLATION

## 2022-01-01 PROCEDURE — 83880 ASSAY OF NATRIURETIC PEPTIDE: CPT

## 2022-01-01 PROCEDURE — 83880 ASSAY OF NATRIURETIC PEPTIDE: CPT | Performed by: EMERGENCY MEDICINE

## 2022-01-01 PROCEDURE — 36415 COLL VENOUS BLD VENIPUNCTURE: CPT

## 2022-01-01 PROCEDURE — 85610 PROTHROMBIN TIME: CPT

## 2022-01-01 PROCEDURE — 99900031 HC PATIENT EDUCATION (STAT)

## 2022-01-01 PROCEDURE — 27000221 HC OXYGEN, UP TO 24 HOURS

## 2022-01-01 PROCEDURE — 84165 PROTEIN E-PHORESIS SERUM: CPT

## 2022-01-01 PROCEDURE — 99233 PR SUBSEQUENT HOSPITAL CARE,LEVL III: ICD-10-PCS | Mod: FS,,, | Performed by: GENERAL PRACTICE

## 2022-01-01 PROCEDURE — 99291 CRITICAL CARE FIRST HOUR: CPT | Mod: 25

## 2022-01-01 PROCEDURE — 99211 OFF/OP EST MAY X REQ PHY/QHP: CPT

## 2022-01-01 PROCEDURE — 97162 PT EVAL MOD COMPLEX 30 MIN: CPT

## 2022-01-01 PROCEDURE — 82803 BLOOD GASES ANY COMBINATION: CPT

## 2022-01-01 PROCEDURE — 87184 SC STD DISK METHOD PER PLATE: CPT | Performed by: GENERAL PRACTICE

## 2022-01-01 PROCEDURE — 80061 LIPID PANEL: CPT

## 2022-01-01 PROCEDURE — 99356 PR PROLONGED SERV,INPATIENT,1ST HR: ICD-10-PCS | Mod: ,,, | Performed by: NURSE PRACTITIONER

## 2022-01-01 PROCEDURE — 27000190 HC CPAP FULL FACE MASK W/VALVE

## 2022-01-01 PROCEDURE — 99222 PR INITIAL HOSPITAL CARE,LEVL II: ICD-10-PCS | Mod: ,,, | Performed by: NURSE PRACTITIONER

## 2022-01-01 PROCEDURE — 85027 COMPLETE CBC AUTOMATED: CPT

## 2022-01-01 PROCEDURE — 99497 PR ADVNCD CARE PLAN 30 MIN: ICD-10-PCS | Mod: ,,, | Performed by: NURSE PRACTITIONER

## 2022-01-01 PROCEDURE — 99232 SBSQ HOSP IP/OBS MODERATE 35: CPT | Mod: 95,,, | Performed by: NURSE PRACTITIONER

## 2022-01-01 PROCEDURE — 99233 SBSQ HOSP IP/OBS HIGH 50: CPT | Mod: ,,, | Performed by: NURSE PRACTITIONER

## 2022-01-01 PROCEDURE — 99233 PR SUBSEQUENT HOSPITAL CARE,LEVL III: ICD-10-PCS | Mod: ,,, | Performed by: INTERNAL MEDICINE

## 2022-01-01 PROCEDURE — 86140 C-REACTIVE PROTEIN: CPT

## 2022-01-01 PROCEDURE — 99233 PR SUBSEQUENT HOSPITAL CARE,LEVL III: ICD-10-PCS | Mod: ,,, | Performed by: NURSE PRACTITIONER

## 2022-01-01 PROCEDURE — 99222 1ST HOSP IP/OBS MODERATE 55: CPT | Mod: ,,, | Performed by: NURSE PRACTITIONER

## 2022-01-01 PROCEDURE — 85610 PROTHROMBIN TIME: CPT | Performed by: EMERGENCY MEDICINE

## 2022-01-01 PROCEDURE — 93005 ELECTROCARDIOGRAM TRACING: CPT

## 2022-01-01 PROCEDURE — 87449 NOS EACH ORGANISM AG IA: CPT | Performed by: NURSE PRACTITIONER

## 2022-01-01 PROCEDURE — 87070 CULTURE OTHR SPECIMN AEROBIC: CPT | Performed by: GENERAL PRACTICE

## 2022-01-01 PROCEDURE — 27100092 HC HIGH FLOW DELIVERY CANNULA

## 2022-01-01 PROCEDURE — 84484 ASSAY OF TROPONIN QUANT: CPT | Performed by: EMERGENCY MEDICINE

## 2022-01-01 PROCEDURE — 99497 ADVNCD CARE PLAN 30 MIN: CPT | Mod: ,,, | Performed by: NURSE PRACTITIONER

## 2022-01-01 PROCEDURE — 99223 PR INITIAL HOSPITAL CARE,LEVL III: ICD-10-PCS | Mod: FS,,, | Performed by: GENERAL PRACTICE

## 2022-01-01 PROCEDURE — 99233 SBSQ HOSP IP/OBS HIGH 50: CPT | Mod: ,,, | Performed by: GENERAL PRACTICE

## 2022-01-01 PROCEDURE — 99223 1ST HOSP IP/OBS HIGH 75: CPT | Mod: FS,,, | Performed by: GENERAL PRACTICE

## 2022-01-01 PROCEDURE — 63600175 PHARM REV CODE 636 W HCPCS: Performed by: GENERAL PRACTICE

## 2022-01-01 PROCEDURE — 85730 THROMBOPLASTIN TIME PARTIAL: CPT | Performed by: EMERGENCY MEDICINE

## 2022-01-01 PROCEDURE — 93010 EKG 12-LEAD: ICD-10-PCS | Mod: ,,, | Performed by: INTERNAL MEDICINE

## 2022-01-01 PROCEDURE — 36600 WITHDRAWAL OF ARTERIAL BLOOD: CPT

## 2022-01-01 PROCEDURE — 63600175 PHARM REV CODE 636 W HCPCS: Performed by: EMERGENCY MEDICINE

## 2022-01-01 PROCEDURE — 99232 PR SUBSEQUENT HOSPITAL CARE,LEVL II: ICD-10-PCS | Mod: 95,,, | Performed by: NURSE PRACTITIONER

## 2022-01-01 PROCEDURE — 99223 1ST HOSP IP/OBS HIGH 75: CPT | Mod: AI,,, | Performed by: INTERNAL MEDICINE

## 2022-01-01 PROCEDURE — 99232 PR SUBSEQUENT HOSPITAL CARE,LEVL II: ICD-10-PCS | Mod: ,,, | Performed by: GENERAL PRACTICE

## 2022-01-01 PROCEDURE — 80048 BASIC METABOLIC PNL TOTAL CA: CPT

## 2022-01-01 PROCEDURE — 83615 LACTATE (LD) (LDH) ENZYME: CPT | Performed by: GENERAL PRACTICE

## 2022-01-01 PROCEDURE — 87636 SARSCOV2 & INF A&B AMP PRB: CPT | Performed by: EMERGENCY MEDICINE

## 2022-01-01 PROCEDURE — 85610 PROTHROMBIN TIME: CPT | Performed by: NURSE PRACTITIONER

## 2022-01-01 PROCEDURE — 99356 PR PROLONGED SERV,INPATIENT,1ST HR: CPT | Mod: ,,, | Performed by: NURSE PRACTITIONER

## 2022-01-01 PROCEDURE — 87116 MYCOBACTERIA CULTURE: CPT

## 2022-01-01 PROCEDURE — 80053 COMPREHEN METABOLIC PANEL: CPT | Performed by: EMERGENCY MEDICINE

## 2022-01-01 PROCEDURE — 36415 COLL VENOUS BLD VENIPUNCTURE: CPT | Performed by: EMERGENCY MEDICINE

## 2022-01-01 PROCEDURE — 93010 ELECTROCARDIOGRAM REPORT: CPT | Mod: ,,, | Performed by: INTERNAL MEDICINE

## 2022-01-01 PROCEDURE — 99214 OFFICE O/P EST MOD 30 MIN: CPT | Mod: ,,, | Performed by: INTERNAL MEDICINE

## 2022-01-01 PROCEDURE — 87206 SMEAR FLUORESCENT/ACID STAI: CPT

## 2022-01-01 PROCEDURE — 83880 ASSAY OF NATRIURETIC PEPTIDE: CPT | Performed by: INTERNAL MEDICINE

## 2022-01-01 PROCEDURE — 85651 RBC SED RATE NONAUTOMATED: CPT | Performed by: INTERNAL MEDICINE

## 2022-01-01 PROCEDURE — 99233 SBSQ HOSP IP/OBS HIGH 50: CPT | Mod: ,,, | Performed by: INTERNAL MEDICINE

## 2022-01-01 PROCEDURE — 87040 BLOOD CULTURE FOR BACTERIA: CPT | Performed by: EMERGENCY MEDICINE

## 2022-01-01 PROCEDURE — 96375 TX/PRO/DX INJ NEW DRUG ADDON: CPT

## 2022-01-01 RX ORDER — ZOLPIDEM TARTRATE 5 MG/1
5 TABLET ORAL NIGHTLY
Status: DISCONTINUED | OUTPATIENT
Start: 2022-01-01 | End: 2022-08-05 | Stop reason: HOSPADM

## 2022-01-01 RX ORDER — PHYTONADIONE 5 MG/1
5 TABLET ORAL ONCE
Status: COMPLETED | OUTPATIENT
Start: 2022-01-01 | End: 2022-01-01

## 2022-01-01 RX ORDER — METOPROLOL SUCCINATE 25 MG/1
25 TABLET, EXTENDED RELEASE ORAL DAILY
Status: DISCONTINUED | OUTPATIENT
Start: 2022-01-01 | End: 2022-08-05 | Stop reason: HOSPADM

## 2022-01-01 RX ORDER — FUROSEMIDE 40 MG/1
40 TABLET ORAL 2 TIMES DAILY
Status: ON HOLD | COMMUNITY
End: 2022-01-01

## 2022-01-01 RX ORDER — FLECAINIDE ACETATE 100 MG/1
100 TABLET ORAL 2 TIMES DAILY
Status: DISCONTINUED | OUTPATIENT
Start: 2022-01-01 | End: 2022-08-05 | Stop reason: HOSPADM

## 2022-01-01 RX ORDER — MYCOPHENOLATE MOFETIL 500 MG/1
TABLET ORAL 2 TIMES DAILY
COMMUNITY

## 2022-01-01 RX ORDER — SODIUM CHLORIDE 0.9 % (FLUSH) 0.9 %
10 SYRINGE (ML) INJECTION
Status: DISCONTINUED | OUTPATIENT
Start: 2022-01-01 | End: 2022-08-05 | Stop reason: HOSPADM

## 2022-01-01 RX ORDER — SULFAMETHOXAZOLE AND TRIMETHOPRIM 800; 160 MG/1; MG/1
1 TABLET ORAL 2 TIMES DAILY
Status: DISCONTINUED | OUTPATIENT
Start: 2022-01-01 | End: 2022-01-01

## 2022-01-01 RX ORDER — LOPERAMIDE HYDROCHLORIDE 2 MG/1
4 CAPSULE ORAL ONCE
Status: COMPLETED | OUTPATIENT
Start: 2022-01-01 | End: 2022-01-01

## 2022-01-01 RX ORDER — OXYMETAZOLINE HCL 0.05 %
2 SPRAY, NON-AEROSOL (ML) NASAL DAILY
Status: COMPLETED | OUTPATIENT
Start: 2022-01-01 | End: 2022-01-01

## 2022-01-01 RX ORDER — PANTOPRAZOLE SODIUM 40 MG/1
40 TABLET, DELAYED RELEASE ORAL DAILY
Status: DISCONTINUED | OUTPATIENT
Start: 2022-01-01 | End: 2022-08-05 | Stop reason: HOSPADM

## 2022-01-01 RX ORDER — PREDNISONE 10 MG/1
10 TABLET ORAL DAILY
Status: ON HOLD | COMMUNITY
End: 2022-01-01

## 2022-01-01 RX ORDER — FUROSEMIDE 40 MG/1
40 TABLET ORAL DAILY
Status: DISCONTINUED | OUTPATIENT
Start: 2022-01-01 | End: 2022-08-05 | Stop reason: HOSPADM

## 2022-01-01 RX ORDER — HYDROXYZINE PAMOATE 25 MG/1
25 CAPSULE ORAL NIGHTLY
Status: DISCONTINUED | OUTPATIENT
Start: 2022-01-01 | End: 2022-08-05 | Stop reason: HOSPADM

## 2022-01-01 RX ORDER — MORPHINE SULFATE 4 MG/ML
2 INJECTION, SOLUTION INTRAMUSCULAR; INTRAVENOUS EVERY 6 HOURS SCHEDULED
Status: DISCONTINUED | OUTPATIENT
Start: 2022-01-01 | End: 2022-08-05 | Stop reason: HOSPADM

## 2022-01-01 RX ORDER — FAMOTIDINE 20 MG/1
20 TABLET, FILM COATED ORAL 2 TIMES DAILY
Status: DISCONTINUED | OUTPATIENT
Start: 2022-01-01 | End: 2022-08-05 | Stop reason: HOSPADM

## 2022-01-01 RX ORDER — POLYETHYLENE GLYCOL 3350 17 G/17G
17 POWDER, FOR SOLUTION ORAL DAILY
Status: DISCONTINUED | OUTPATIENT
Start: 2022-01-01 | End: 2022-08-05 | Stop reason: HOSPADM

## 2022-01-01 RX ORDER — LACTOBACILLUS ACIDOPHILUS 500MM CELL
1 CAPSULE ORAL
Status: DISCONTINUED | OUTPATIENT
Start: 2022-01-01 | End: 2022-08-05 | Stop reason: HOSPADM

## 2022-01-01 RX ORDER — ACETAMINOPHEN 325 MG/1
650 TABLET ORAL EVERY 8 HOURS PRN
Status: DISCONTINUED | OUTPATIENT
Start: 2022-01-01 | End: 2022-08-05 | Stop reason: HOSPADM

## 2022-01-01 RX ORDER — LEVOTHYROXINE SODIUM 50 UG/1
50 TABLET ORAL DAILY
Status: DISCONTINUED | OUTPATIENT
Start: 2022-01-01 | End: 2022-01-01

## 2022-01-01 RX ORDER — WARFARIN SODIUM 5 MG/1
5 TABLET ORAL DAILY
Status: DISCONTINUED | OUTPATIENT
Start: 2022-01-01 | End: 2022-01-01

## 2022-01-01 RX ORDER — ONDANSETRON 2 MG/ML
4 INJECTION INTRAMUSCULAR; INTRAVENOUS EVERY 6 HOURS PRN
Status: DISCONTINUED | OUTPATIENT
Start: 2022-01-01 | End: 2022-08-05 | Stop reason: HOSPADM

## 2022-01-01 RX ORDER — ALPRAZOLAM 0.25 MG/1
0.25 TABLET ORAL EVERY 6 HOURS PRN
Status: DISCONTINUED | OUTPATIENT
Start: 2022-01-01 | End: 2022-08-05 | Stop reason: HOSPADM

## 2022-01-01 RX ORDER — SPIRONOLACTONE 25 MG/1
25 TABLET ORAL DAILY
Status: DISCONTINUED | OUTPATIENT
Start: 2022-01-01 | End: 2022-08-05 | Stop reason: HOSPADM

## 2022-01-01 RX ORDER — ALPRAZOLAM 0.25 MG/1
0.25 TABLET ORAL 3 TIMES DAILY PRN
Status: DISCONTINUED | OUTPATIENT
Start: 2022-01-01 | End: 2022-01-01

## 2022-01-01 RX ORDER — CLINDAMYCIN PHOSPHATE 600 MG/50ML
600 INJECTION, SOLUTION INTRAVENOUS
Status: DISCONTINUED | OUTPATIENT
Start: 2022-01-01 | End: 2022-08-05 | Stop reason: HOSPADM

## 2022-01-01 RX ORDER — CEFTRIAXONE 2 G/50ML
2 INJECTION, SOLUTION INTRAVENOUS
Status: DISCONTINUED | OUTPATIENT
Start: 2022-01-01 | End: 2022-08-05 | Stop reason: HOSPADM

## 2022-01-01 RX ORDER — MYCOPHENOLATE MOFETIL 250 MG/1
500 CAPSULE ORAL 2 TIMES DAILY
Status: DISCONTINUED | OUTPATIENT
Start: 2022-01-01 | End: 2022-01-01

## 2022-01-01 RX ORDER — MORPHINE SULFATE 20 MG/ML
5 SOLUTION ORAL
Status: DISCONTINUED | OUTPATIENT
Start: 2022-01-01 | End: 2022-08-05 | Stop reason: HOSPADM

## 2022-01-01 RX ORDER — PRAVASTATIN SODIUM 10 MG/1
20 TABLET ORAL NIGHTLY
Status: DISCONTINUED | OUTPATIENT
Start: 2022-01-01 | End: 2022-08-05 | Stop reason: HOSPADM

## 2022-01-01 RX ORDER — MYCOPHENOLATE MOFETIL 250 MG/1
1000 CAPSULE ORAL 2 TIMES DAILY
Status: DISCONTINUED | OUTPATIENT
Start: 2022-01-01 | End: 2022-08-05 | Stop reason: HOSPADM

## 2022-01-01 RX ORDER — LEVOTHYROXINE SODIUM 50 UG/1
50 TABLET ORAL
Status: DISCONTINUED | OUTPATIENT
Start: 2022-01-01 | End: 2022-08-05 | Stop reason: HOSPADM

## 2022-01-01 RX ORDER — LOSARTAN POTASSIUM 50 MG/1
50 TABLET ORAL DAILY
Status: DISCONTINUED | OUTPATIENT
Start: 2022-01-01 | End: 2022-08-05 | Stop reason: HOSPADM

## 2022-01-01 RX ORDER — MORPHINE SULFATE 4 MG/ML
2 INJECTION, SOLUTION INTRAMUSCULAR; INTRAVENOUS
Status: DISCONTINUED | OUTPATIENT
Start: 2022-01-01 | End: 2022-08-05 | Stop reason: HOSPADM

## 2022-01-01 RX ORDER — NYSTATIN 100000 [USP'U]/ML
500000 SUSPENSION ORAL
Status: DISCONTINUED | OUTPATIENT
Start: 2022-01-01 | End: 2022-08-05 | Stop reason: HOSPADM

## 2022-01-01 RX ORDER — PRIMAQUINE PHOSPHATE 15 MG/1
26.3 TABLET ORAL
Status: DISCONTINUED | OUTPATIENT
Start: 2022-01-01 | End: 2022-08-05 | Stop reason: HOSPADM

## 2022-01-01 RX ORDER — METHYLPREDNISOLONE SOD SUCC 125 MG
125 VIAL (EA) INJECTION
Status: COMPLETED | OUTPATIENT
Start: 2022-01-01 | End: 2022-01-01

## 2022-01-01 RX ORDER — MORPHINE SULFATE 4 MG/ML
2 INJECTION, SOLUTION INTRAMUSCULAR; INTRAVENOUS EVERY 6 HOURS PRN
Status: DISCONTINUED | OUTPATIENT
Start: 2022-01-01 | End: 2022-01-01

## 2022-01-01 RX ORDER — DOCUSATE CALCIUM 240 MG
240 CAPSULE ORAL DAILY
Status: DISCONTINUED | OUTPATIENT
Start: 2022-01-01 | End: 2022-08-05 | Stop reason: HOSPADM

## 2022-01-01 RX ORDER — HYDROCODONE BITARTRATE AND ACETAMINOPHEN 5; 325 MG/1; MG/1
1 TABLET ORAL EVERY 4 HOURS PRN
Status: DISCONTINUED | OUTPATIENT
Start: 2022-01-01 | End: 2022-01-01

## 2022-01-01 RX ORDER — FUROSEMIDE 10 MG/ML
40 INJECTION INTRAMUSCULAR; INTRAVENOUS ONCE
Status: COMPLETED | OUTPATIENT
Start: 2022-01-01 | End: 2022-01-01

## 2022-01-01 RX ORDER — GABAPENTIN 300 MG/1
300 CAPSULE ORAL NIGHTLY
Status: DISCONTINUED | OUTPATIENT
Start: 2022-01-01 | End: 2022-01-01

## 2022-01-01 RX ORDER — WARFARIN 1 MG/1
2 TABLET ORAL DAILY
Status: DISCONTINUED | OUTPATIENT
Start: 2022-01-01 | End: 2022-01-01

## 2022-01-01 RX ORDER — NAPROXEN SODIUM 220 MG/1
81 TABLET, FILM COATED ORAL DAILY
Status: DISCONTINUED | OUTPATIENT
Start: 2022-01-01 | End: 2022-08-05 | Stop reason: HOSPADM

## 2022-01-01 RX ORDER — BISACODYL 10 MG
10 SUPPOSITORY, RECTAL RECTAL ONCE AS NEEDED
Status: DISCONTINUED | OUTPATIENT
Start: 2022-01-01 | End: 2022-08-05 | Stop reason: HOSPADM

## 2022-01-01 RX ORDER — CHOLECALCIFEROL (VITAMIN D3) 25 MCG
5000 TABLET ORAL DAILY
Status: DISCONTINUED | OUTPATIENT
Start: 2022-01-01 | End: 2022-08-05 | Stop reason: HOSPADM

## 2022-01-01 RX ORDER — MORPHINE SULFATE 4 MG/ML
2 INJECTION, SOLUTION INTRAMUSCULAR; INTRAVENOUS EVERY 6 HOURS SCHEDULED
Status: DISCONTINUED | OUTPATIENT
Start: 2022-01-01 | End: 2022-01-01

## 2022-01-01 RX ORDER — GABAPENTIN 300 MG/1
300 CAPSULE ORAL 2 TIMES DAILY
Status: DISCONTINUED | OUTPATIENT
Start: 2022-01-01 | End: 2022-08-05 | Stop reason: HOSPADM

## 2022-01-01 RX ORDER — TALC
6 POWDER (GRAM) TOPICAL NIGHTLY PRN
Status: DISCONTINUED | OUTPATIENT
Start: 2022-01-01 | End: 2022-08-05 | Stop reason: HOSPADM

## 2022-01-01 RX ADMIN — METHYLPREDNISOLONE SODIUM SUCCINATE 80 MG: 40 INJECTION, POWDER, FOR SOLUTION INTRAMUSCULAR; INTRAVENOUS at 09:08

## 2022-01-01 RX ADMIN — Medication 1 CAPSULE: at 01:07

## 2022-01-01 RX ADMIN — WARFARIN SODIUM 5 MG: 5 TABLET ORAL at 05:07

## 2022-01-01 RX ADMIN — PRAVASTATIN SODIUM 20 MG: 10 TABLET ORAL at 08:07

## 2022-01-01 RX ADMIN — CHOLECALCIFEROL TAB 25 MCG (1000 UNIT) 5000 UNITS: 25 TAB at 08:07

## 2022-01-01 RX ADMIN — Medication 20 MG: at 09:08

## 2022-01-01 RX ADMIN — CLINDAMYCIN IN 5 PERCENT DEXTROSE 600 MG: 12 INJECTION, SOLUTION INTRAVENOUS at 03:08

## 2022-01-01 RX ADMIN — PSYLLIUM HUSK 1 PACKET: 3.4 POWDER ORAL at 09:07

## 2022-01-01 RX ADMIN — METHYLPREDNISOLONE SODIUM SUCCINATE 80 MG: 40 INJECTION, POWDER, FOR SOLUTION INTRAMUSCULAR; INTRAVENOUS at 10:07

## 2022-01-01 RX ADMIN — GABAPENTIN 300 MG: 300 CAPSULE ORAL at 10:08

## 2022-01-01 RX ADMIN — ZOLPIDEM TARTRATE 5 MG: 5 TABLET, COATED ORAL at 08:07

## 2022-01-01 RX ADMIN — Medication 1 CAPSULE: at 11:07

## 2022-01-01 RX ADMIN — MYCOPHENOLATE MOFETIL 1000 MG: 250 CAPSULE ORAL at 08:07

## 2022-01-01 RX ADMIN — DEXTROSE MONOHYDRATE 1 G: 5 INJECTION INTRAVENOUS at 10:07

## 2022-01-01 RX ADMIN — CLINDAMYCIN IN 5 PERCENT DEXTROSE 600 MG: 12 INJECTION, SOLUTION INTRAVENOUS at 11:07

## 2022-01-01 RX ADMIN — HYDROXYZINE PAMOATE 25 MG: 25 CAPSULE ORAL at 09:08

## 2022-01-01 RX ADMIN — ONDANSETRON 4 MG: 2 INJECTION INTRAMUSCULAR; INTRAVENOUS at 06:07

## 2022-01-01 RX ADMIN — MYCOPHENOLATE MOFETIL 500 MG: 250 CAPSULE ORAL at 09:07

## 2022-01-01 RX ADMIN — CLINDAMYCIN IN 5 PERCENT DEXTROSE 600 MG: 12 INJECTION, SOLUTION INTRAVENOUS at 04:07

## 2022-01-01 RX ADMIN — METHYLPREDNISOLONE SODIUM SUCCINATE 80 MG: 40 INJECTION, POWDER, FOR SOLUTION INTRAMUSCULAR; INTRAVENOUS at 02:07

## 2022-01-01 RX ADMIN — ZOLPIDEM TARTRATE 5 MG: 5 TABLET, COATED ORAL at 09:08

## 2022-01-01 RX ADMIN — Medication 1 CAPSULE: at 06:07

## 2022-01-01 RX ADMIN — MYCOPHENOLATE MOFETIL 1000 MG: 250 CAPSULE ORAL at 09:08

## 2022-01-01 RX ADMIN — NYSTATIN 500000 UNITS: 100000 SUSPENSION ORAL at 10:08

## 2022-01-01 RX ADMIN — PANTOPRAZOLE SODIUM 40 MG: 40 TABLET, DELAYED RELEASE ORAL at 09:07

## 2022-01-01 RX ADMIN — PRIMAQUINE PHOSPHATE 26.3 MG: 15 TABLET, FILM COATED ORAL at 06:08

## 2022-01-01 RX ADMIN — GABAPENTIN 300 MG: 300 CAPSULE ORAL at 09:07

## 2022-01-01 RX ADMIN — Medication 20 MG: at 02:07

## 2022-01-01 RX ADMIN — CHOLECALCIFEROL TAB 25 MCG (1000 UNIT) 5000 UNITS: 25 TAB at 09:07

## 2022-01-01 RX ADMIN — Medication 20 MG: at 09:07

## 2022-01-01 RX ADMIN — Medication 1 CAPSULE: at 09:07

## 2022-01-01 RX ADMIN — FLECAINIDE ACETATE 100 MG: 100 TABLET ORAL at 10:07

## 2022-01-01 RX ADMIN — SPIRONOLACTONE 25 MG: 25 TABLET ORAL at 09:07

## 2022-01-01 RX ADMIN — SPIRONOLACTONE 25 MG: 25 TABLET ORAL at 09:08

## 2022-01-01 RX ADMIN — METOPROLOL SUCCINATE 25 MG: 25 TABLET, EXTENDED RELEASE ORAL at 08:07

## 2022-01-01 RX ADMIN — METOPROLOL SUCCINATE 25 MG: 25 TABLET, EXTENDED RELEASE ORAL at 09:08

## 2022-01-01 RX ADMIN — GABAPENTIN 300 MG: 300 CAPSULE ORAL at 10:07

## 2022-01-01 RX ADMIN — FUROSEMIDE 40 MG: 40 TABLET ORAL at 09:07

## 2022-01-01 RX ADMIN — FUROSEMIDE 40 MG: 40 TABLET ORAL at 10:08

## 2022-01-01 RX ADMIN — METHYLPREDNISOLONE SODIUM SUCCINATE 80 MG: 40 INJECTION, POWDER, FOR SOLUTION INTRAMUSCULAR; INTRAVENOUS at 09:07

## 2022-01-01 RX ADMIN — Medication 81 MG: at 08:07

## 2022-01-01 RX ADMIN — Medication 1 CAPSULE: at 11:08

## 2022-01-01 RX ADMIN — ALPRAZOLAM 0.25 MG: 0.25 TABLET ORAL at 02:07

## 2022-01-01 RX ADMIN — LEVOTHYROXINE SODIUM 50 MCG: 50 TABLET ORAL at 06:08

## 2022-01-01 RX ADMIN — POLYETHYLENE GLYCOL 3350 17 G: 17 POWDER, FOR SOLUTION ORAL at 10:07

## 2022-01-01 RX ADMIN — CHOLECALCIFEROL TAB 25 MCG (1000 UNIT) 5000 UNITS: 25 TAB at 10:08

## 2022-01-01 RX ADMIN — CLINDAMYCIN IN 5 PERCENT DEXTROSE 600 MG: 12 INJECTION, SOLUTION INTRAVENOUS at 06:08

## 2022-01-01 RX ADMIN — LOSARTAN POTASSIUM 50 MG: 50 TABLET, FILM COATED ORAL at 09:07

## 2022-01-01 RX ADMIN — Medication 81 MG: at 10:07

## 2022-01-01 RX ADMIN — METHYLPREDNISOLONE SODIUM SUCCINATE 40 MG: 40 INJECTION, POWDER, FOR SOLUTION INTRAMUSCULAR; INTRAVENOUS at 01:08

## 2022-01-01 RX ADMIN — GABAPENTIN 300 MG: 300 CAPSULE ORAL at 09:08

## 2022-01-01 RX ADMIN — FLECAINIDE ACETATE 100 MG: 100 TABLET ORAL at 08:07

## 2022-01-01 RX ADMIN — METHYLPREDNISOLONE SODIUM SUCCINATE 125 MG: 125 INJECTION, POWDER, FOR SOLUTION INTRAMUSCULAR; INTRAVENOUS at 05:07

## 2022-01-01 RX ADMIN — LOSARTAN POTASSIUM 50 MG: 50 TABLET, FILM COATED ORAL at 10:07

## 2022-01-01 RX ADMIN — FLECAINIDE ACETATE 100 MG: 100 TABLET ORAL at 09:07

## 2022-01-01 RX ADMIN — Medication 81 MG: at 10:08

## 2022-01-01 RX ADMIN — METHYLPREDNISOLONE SODIUM SUCCINATE 80 MG: 40 INJECTION, POWDER, FOR SOLUTION INTRAMUSCULAR; INTRAVENOUS at 05:08

## 2022-01-01 RX ADMIN — ONDANSETRON 4 MG: 2 INJECTION INTRAMUSCULAR; INTRAVENOUS at 07:07

## 2022-01-01 RX ADMIN — FLECAINIDE ACETATE 100 MG: 100 TABLET ORAL at 09:08

## 2022-01-01 RX ADMIN — CLINDAMYCIN IN 5 PERCENT DEXTROSE 600 MG: 12 INJECTION, SOLUTION INTRAVENOUS at 08:07

## 2022-01-01 RX ADMIN — GABAPENTIN 300 MG: 300 CAPSULE ORAL at 12:07

## 2022-01-01 RX ADMIN — CLINDAMYCIN IN 5 PERCENT DEXTROSE 600 MG: 12 INJECTION, SOLUTION INTRAVENOUS at 10:07

## 2022-01-01 RX ADMIN — ALPRAZOLAM 0.25 MG: 0.25 TABLET ORAL at 03:08

## 2022-01-01 RX ADMIN — Medication 1 CAPSULE: at 05:07

## 2022-01-01 RX ADMIN — METHYLPREDNISOLONE SODIUM SUCCINATE 80 MG: 40 INJECTION, POWDER, FOR SOLUTION INTRAMUSCULAR; INTRAVENOUS at 06:07

## 2022-01-01 RX ADMIN — Medication 81 MG: at 09:08

## 2022-01-01 RX ADMIN — CLINDAMYCIN IN 5 PERCENT DEXTROSE 600 MG: 12 INJECTION, SOLUTION INTRAVENOUS at 07:08

## 2022-01-01 RX ADMIN — CHOLECALCIFEROL TAB 25 MCG (1000 UNIT) 5000 UNITS: 25 TAB at 11:08

## 2022-01-01 RX ADMIN — METHYLPREDNISOLONE SODIUM SUCCINATE 80 MG: 40 INJECTION, POWDER, FOR SOLUTION INTRAMUSCULAR; INTRAVENOUS at 11:07

## 2022-01-01 RX ADMIN — CLINDAMYCIN IN 5 PERCENT DEXTROSE 600 MG: 12 INJECTION, SOLUTION INTRAVENOUS at 12:07

## 2022-01-01 RX ADMIN — METHYLPREDNISOLONE SODIUM SUCCINATE 80 MG: 40 INJECTION, POWDER, FOR SOLUTION INTRAMUSCULAR; INTRAVENOUS at 02:08

## 2022-01-01 RX ADMIN — FLECAINIDE ACETATE 100 MG: 100 TABLET ORAL at 10:08

## 2022-01-01 RX ADMIN — METHYLPREDNISOLONE SODIUM SUCCINATE 40 MG: 40 INJECTION, POWDER, FOR SOLUTION INTRAMUSCULAR; INTRAVENOUS at 06:08

## 2022-01-01 RX ADMIN — PANTOPRAZOLE SODIUM 40 MG: 40 TABLET, DELAYED RELEASE ORAL at 09:08

## 2022-01-01 RX ADMIN — CLINDAMYCIN IN 5 PERCENT DEXTROSE 600 MG: 12 INJECTION, SOLUTION INTRAVENOUS at 09:08

## 2022-01-01 RX ADMIN — CLINDAMYCIN IN 5 PERCENT DEXTROSE 600 MG: 12 INJECTION, SOLUTION INTRAVENOUS at 09:07

## 2022-01-01 RX ADMIN — ZOLPIDEM TARTRATE 5 MG: 5 TABLET, COATED ORAL at 12:07

## 2022-01-01 RX ADMIN — Medication 20 MG: at 10:08

## 2022-01-01 RX ADMIN — NYSTATIN 500000 UNITS: 100000 SUSPENSION ORAL at 06:08

## 2022-01-01 RX ADMIN — PRIMAQUINE PHOSPHATE 26.3 MG: 15 TABLET, FILM COATED ORAL at 08:07

## 2022-01-01 RX ADMIN — Medication 20 MG: at 08:07

## 2022-01-01 RX ADMIN — DOCUSATE CALCIUM 240 MG: 240 CAPSULE, LIQUID FILLED ORAL at 09:07

## 2022-01-01 RX ADMIN — PRIMAQUINE PHOSPHATE 26.3 MG: 15 TABLET, FILM COATED ORAL at 03:08

## 2022-01-01 RX ADMIN — SPIRONOLACTONE 25 MG: 25 TABLET ORAL at 08:07

## 2022-01-01 RX ADMIN — PRIMAQUINE PHOSPHATE 26.3 MG: 15 TABLET, FILM COATED ORAL at 10:08

## 2022-01-01 RX ADMIN — Medication 1 CAPSULE: at 10:07

## 2022-01-01 RX ADMIN — GABAPENTIN 300 MG: 300 CAPSULE ORAL at 08:07

## 2022-01-01 RX ADMIN — FUROSEMIDE 40 MG: 40 TABLET ORAL at 09:08

## 2022-01-01 RX ADMIN — ONDANSETRON 4 MG: 2 INJECTION INTRAMUSCULAR; INTRAVENOUS at 12:07

## 2022-01-01 RX ADMIN — PSYLLIUM HUSK 1 PACKET: 3.4 POWDER ORAL at 09:08

## 2022-01-01 RX ADMIN — PRAVASTATIN SODIUM 20 MG: 10 TABLET ORAL at 09:08

## 2022-01-01 RX ADMIN — CEFTRIAXONE SODIUM 1 G: 1 INJECTION, POWDER, FOR SOLUTION INTRAMUSCULAR; INTRAVENOUS at 08:07

## 2022-01-01 RX ADMIN — CEFTRIAXONE 2 G: 2 INJECTION, SOLUTION INTRAVENOUS at 09:08

## 2022-01-01 RX ADMIN — HYDROXYZINE PAMOATE 25 MG: 25 CAPSULE ORAL at 09:07

## 2022-01-01 RX ADMIN — ALPRAZOLAM 0.25 MG: 0.25 TABLET ORAL at 08:07

## 2022-01-01 RX ADMIN — LEVOTHYROXINE SODIUM 50 MCG: 50 TABLET ORAL at 04:08

## 2022-01-01 RX ADMIN — METHYLPREDNISOLONE SODIUM SUCCINATE 80 MG: 40 INJECTION, POWDER, FOR SOLUTION INTRAMUSCULAR; INTRAVENOUS at 01:07

## 2022-01-01 RX ADMIN — PRIMAQUINE PHOSPHATE 26.3 MG: 15 TABLET, FILM COATED ORAL at 06:07

## 2022-01-01 RX ADMIN — METOPROLOL SUCCINATE 25 MG: 25 TABLET, EXTENDED RELEASE ORAL at 09:07

## 2022-01-01 RX ADMIN — Medication 1 CAPSULE: at 12:07

## 2022-01-01 RX ADMIN — PRAVASTATIN SODIUM 20 MG: 10 TABLET ORAL at 10:07

## 2022-01-01 RX ADMIN — Medication 20 MG: at 12:07

## 2022-01-01 RX ADMIN — WARFARIN SODIUM 2 MG: 1 TABLET ORAL at 06:08

## 2022-01-01 RX ADMIN — PSYLLIUM HUSK 1 PACKET: 3.4 POWDER ORAL at 08:07

## 2022-01-01 RX ADMIN — FLECAINIDE ACETATE 100 MG: 100 TABLET ORAL at 12:07

## 2022-01-01 RX ADMIN — WARFARIN SODIUM 2 MG: 1 TABLET ORAL at 05:08

## 2022-01-01 RX ADMIN — PANTOPRAZOLE SODIUM 40 MG: 40 TABLET, DELAYED RELEASE ORAL at 10:07

## 2022-01-01 RX ADMIN — METHYLPREDNISOLONE SODIUM SUCCINATE 80 MG: 40 INJECTION, POWDER, FOR SOLUTION INTRAMUSCULAR; INTRAVENOUS at 05:07

## 2022-01-01 RX ADMIN — LOPERAMIDE HYDROCHLORIDE 4 MG: 2 CAPSULE ORAL at 02:07

## 2022-01-01 RX ADMIN — Medication 1 CAPSULE: at 07:08

## 2022-01-01 RX ADMIN — LEVOTHYROXINE SODIUM 50 MCG: 50 TABLET ORAL at 09:07

## 2022-01-01 RX ADMIN — HYDROXYZINE PAMOATE 25 MG: 25 CAPSULE ORAL at 08:07

## 2022-01-01 RX ADMIN — LOSARTAN POTASSIUM 50 MG: 50 TABLET, FILM COATED ORAL at 09:08

## 2022-01-01 RX ADMIN — MYCOPHENOLATE MOFETIL 500 MG: 250 CAPSULE ORAL at 10:07

## 2022-01-01 RX ADMIN — CHOLECALCIFEROL TAB 25 MCG (1000 UNIT) 5000 UNITS: 25 TAB at 09:08

## 2022-01-01 RX ADMIN — Medication 1 CAPSULE: at 07:07

## 2022-01-01 RX ADMIN — POLYETHYLENE GLYCOL 3350 17 G: 17 POWDER, FOR SOLUTION ORAL at 09:08

## 2022-01-01 RX ADMIN — Medication 1 CAPSULE: at 05:08

## 2022-01-01 RX ADMIN — CLINDAMYCIN IN 5 PERCENT DEXTROSE 600 MG: 12 INJECTION, SOLUTION INTRAVENOUS at 02:08

## 2022-01-01 RX ADMIN — OXYMETAZOLINE HCL 2 SPRAY: 0.05 SPRAY NASAL at 06:08

## 2022-01-01 RX ADMIN — MYCOPHENOLATE MOFETIL 1000 MG: 250 CAPSULE ORAL at 10:07

## 2022-01-01 RX ADMIN — PRIMAQUINE PHOSPHATE 26.3 MG: 15 TABLET, FILM COATED ORAL at 05:08

## 2022-01-01 RX ADMIN — Medication 1 CAPSULE: at 06:08

## 2022-01-01 RX ADMIN — LEVOTHYROXINE SODIUM 50 MCG: 50 TABLET ORAL at 05:07

## 2022-01-01 RX ADMIN — AZITHROMYCIN MONOHYDRATE 500 MG: 500 INJECTION, POWDER, LYOPHILIZED, FOR SOLUTION INTRAVENOUS at 08:07

## 2022-01-01 RX ADMIN — ZOLPIDEM TARTRATE 5 MG: 5 TABLET, COATED ORAL at 10:07

## 2022-01-01 RX ADMIN — PHYTONADIONE 5 MG: 5 TABLET ORAL at 09:07

## 2022-01-01 RX ADMIN — Medication 1 CAPSULE: at 09:08

## 2022-01-01 RX ADMIN — LOSARTAN POTASSIUM 50 MG: 50 TABLET, FILM COATED ORAL at 10:08

## 2022-01-01 RX ADMIN — DOCUSATE CALCIUM 240 MG: 240 CAPSULE, LIQUID FILLED ORAL at 09:08

## 2022-01-01 RX ADMIN — Medication 20 MG: at 10:07

## 2022-01-01 RX ADMIN — MYCOPHENOLATE MOFETIL 1000 MG: 250 CAPSULE ORAL at 10:08

## 2022-01-01 RX ADMIN — MYCOPHENOLATE MOFETIL 1000 MG: 250 CAPSULE ORAL at 09:07

## 2022-01-01 RX ADMIN — SULFAMETHOXAZOLE AND TRIMETHOPRIM 419.2 MG: 80; 16 INJECTION, SOLUTION, CONCENTRATE INTRAVENOUS at 12:07

## 2022-01-01 RX ADMIN — SULFAMETHOXAZOLE AND TRIMETHOPRIM 419.2 MG: 80; 16 INJECTION, SOLUTION, CONCENTRATE INTRAVENOUS at 11:07

## 2022-01-01 RX ADMIN — SULFAMETHOXAZOLE AND TRIMETHOPRIM 419.2 MG: 80; 16 INJECTION, SOLUTION, CONCENTRATE INTRAVENOUS at 05:07

## 2022-01-01 RX ADMIN — NYSTATIN: 100000 SUSPENSION ORAL at 10:08

## 2022-01-01 RX ADMIN — LEVOTHYROXINE SODIUM 50 MCG: 50 TABLET ORAL at 07:07

## 2022-01-01 RX ADMIN — MORPHINE SULFATE: 4 INJECTION INTRAVENOUS at 06:08

## 2022-01-01 RX ADMIN — LEVOTHYROXINE SODIUM 50 MCG: 50 TABLET ORAL at 08:07

## 2022-01-01 RX ADMIN — Medication 81 MG: at 09:07

## 2022-01-01 RX ADMIN — METHYLPREDNISOLONE SODIUM SUCCINATE 80 MG: 40 INJECTION, POWDER, FOR SOLUTION INTRAMUSCULAR; INTRAVENOUS at 03:07

## 2022-01-01 RX ADMIN — HYDROXYZINE PAMOATE 25 MG: 25 CAPSULE ORAL at 12:07

## 2022-01-01 RX ADMIN — MORPHINE SULFATE: 4 INJECTION INTRAVENOUS at 10:08

## 2022-01-01 RX ADMIN — NYSTATIN 500000 UNITS: 100000 SUSPENSION ORAL at 09:08

## 2022-01-01 RX ADMIN — HYDROXYZINE PAMOATE 25 MG: 25 CAPSULE ORAL at 10:08

## 2022-01-01 RX ADMIN — ALPRAZOLAM 0.25 MG: 0.25 TABLET ORAL at 07:07

## 2022-01-01 RX ADMIN — HYDROXYZINE PAMOATE 25 MG: 25 CAPSULE ORAL at 10:07

## 2022-01-01 RX ADMIN — ALPRAZOLAM 0.25 MG: 0.25 TABLET ORAL at 01:07

## 2022-01-01 RX ADMIN — METHYLPREDNISOLONE SODIUM SUCCINATE 40 MG: 40 INJECTION, POWDER, FOR SOLUTION INTRAMUSCULAR; INTRAVENOUS at 10:08

## 2022-01-01 RX ADMIN — PRAVASTATIN SODIUM 20 MG: 10 TABLET ORAL at 09:07

## 2022-01-01 RX ADMIN — LEVOTHYROXINE SODIUM 50 MCG: 50 TABLET ORAL at 05:08

## 2022-01-01 RX ADMIN — MYCOPHENOLATE MOFETIL 1000 MG: 250 CAPSULE ORAL at 12:07

## 2022-01-01 RX ADMIN — POLYETHYLENE GLYCOL 3350 17 G: 17 POWDER, FOR SOLUTION ORAL at 09:07

## 2022-01-01 RX ADMIN — ONDANSETRON 4 MG: 2 INJECTION INTRAMUSCULAR; INTRAVENOUS at 11:07

## 2022-01-01 RX ADMIN — MORPHINE SULFATE 4 MG: 4 INJECTION INTRAVENOUS at 03:08

## 2022-01-01 RX ADMIN — SULFAMETHOXAZOLE AND TRIMETHOPRIM 1 TABLET: 800; 160 TABLET ORAL at 10:07

## 2022-01-01 RX ADMIN — Medication 1 CAPSULE: at 10:08

## 2022-01-01 RX ADMIN — ZOLPIDEM TARTRATE 5 MG: 5 TABLET, COATED ORAL at 09:07

## 2022-01-01 RX ADMIN — CLINDAMYCIN IN 5 PERCENT DEXTROSE 600 MG: 12 INJECTION, SOLUTION INTRAVENOUS at 01:07

## 2022-01-01 RX ADMIN — CLINDAMYCIN IN 5 PERCENT DEXTROSE 600 MG: 12 INJECTION, SOLUTION INTRAVENOUS at 03:07

## 2022-01-01 RX ADMIN — PRAVASTATIN SODIUM 20 MG: 10 TABLET ORAL at 12:07

## 2022-01-01 RX ADMIN — HYDROCODONE BITARTRATE AND ACETAMINOPHEN 1 TABLET: 5; 325 TABLET ORAL at 07:07

## 2022-01-01 RX ADMIN — PRAVASTATIN SODIUM 20 MG: 10 TABLET ORAL at 10:08

## 2022-01-01 RX ADMIN — ONDANSETRON 4 MG: 2 INJECTION INTRAMUSCULAR; INTRAVENOUS at 08:07

## 2022-01-01 RX ADMIN — SPIRONOLACTONE 25 MG: 25 TABLET ORAL at 10:07

## 2022-01-01 RX ADMIN — FUROSEMIDE 40 MG: 40 TABLET ORAL at 10:07

## 2022-01-01 RX ADMIN — Medication 1 CAPSULE: at 08:07

## 2022-01-01 RX ADMIN — PANTOPRAZOLE SODIUM 40 MG: 40 TABLET, DELAYED RELEASE ORAL at 08:07

## 2022-01-01 RX ADMIN — PRIMAQUINE PHOSPHATE 26.3 MG: 15 TABLET, FILM COATED ORAL at 05:07

## 2022-01-01 RX ADMIN — CEFTRIAXONE 2 G: 2 INJECTION, SOLUTION INTRAVENOUS at 07:08

## 2022-01-01 RX ADMIN — CHOLECALCIFEROL TAB 25 MCG (1000 UNIT) 5000 UNITS: 25 TAB at 10:07

## 2022-01-01 RX ADMIN — DEXTROSE MONOHYDRATE 1 G: 5 INJECTION INTRAVENOUS at 09:07

## 2022-01-01 RX ADMIN — FUROSEMIDE 40 MG: 40 TABLET ORAL at 08:07

## 2022-01-01 RX ADMIN — ONDANSETRON 4 MG: 2 INJECTION INTRAMUSCULAR; INTRAVENOUS at 08:08

## 2022-01-01 RX ADMIN — METOPROLOL SUCCINATE 25 MG: 25 TABLET, EXTENDED RELEASE ORAL at 10:08

## 2022-01-01 RX ADMIN — METOPROLOL SUCCINATE 25 MG: 25 TABLET, EXTENDED RELEASE ORAL at 10:07

## 2022-01-01 RX ADMIN — MYCOPHENOLATE MOFETIL 1000 MG: 250 CAPSULE ORAL at 11:08

## 2022-01-01 RX ADMIN — FUROSEMIDE 40 MG: 10 INJECTION, SOLUTION INTRAMUSCULAR; INTRAVENOUS at 10:07

## 2022-01-01 RX ADMIN — LEVOTHYROXINE SODIUM 50 MCG: 50 TABLET ORAL at 04:07

## 2022-01-01 RX ADMIN — LOSARTAN POTASSIUM 50 MG: 50 TABLET, FILM COATED ORAL at 08:07

## 2022-01-01 RX ADMIN — OXYMETAZOLINE HCL 2 SPRAY: 0.05 SPRAY NASAL at 09:08

## 2022-01-01 RX ADMIN — PSYLLIUM HUSK 1 PACKET: 3.4 POWDER ORAL at 10:08

## 2022-01-01 RX ADMIN — SPIRONOLACTONE 25 MG: 25 TABLET ORAL at 10:08

## 2022-01-01 RX ADMIN — ZOLPIDEM TARTRATE 5 MG: 5 TABLET, COATED ORAL at 10:08

## 2022-01-01 RX ADMIN — CLINDAMYCIN IN 5 PERCENT DEXTROSE 600 MG: 12 INJECTION, SOLUTION INTRAVENOUS at 10:08

## 2022-01-01 RX ADMIN — LEVOTHYROXINE SODIUM 50 MCG: 50 TABLET ORAL at 10:07

## 2022-01-01 RX ADMIN — CLINDAMYCIN IN 5 PERCENT DEXTROSE 600 MG: 12 INJECTION, SOLUTION INTRAVENOUS at 05:07

## 2022-01-01 RX ADMIN — CHOLECALCIFEROL TAB 25 MCG (1000 UNIT) 5000 UNITS: 25 TAB at 11:07

## 2022-01-01 RX ADMIN — PSYLLIUM HUSK 1 PACKET: 3.4 POWDER ORAL at 10:07

## 2022-01-01 RX ADMIN — CLINDAMYCIN IN 5 PERCENT DEXTROSE 600 MG: 12 INJECTION, SOLUTION INTRAVENOUS at 05:08

## 2022-01-01 RX ADMIN — METHYLPREDNISOLONE SODIUM SUCCINATE 40 MG: 40 INJECTION, POWDER, FOR SOLUTION INTRAMUSCULAR; INTRAVENOUS at 05:08

## 2022-01-01 RX ADMIN — PANTOPRAZOLE SODIUM 40 MG: 40 TABLET, DELAYED RELEASE ORAL at 10:08

## 2022-05-26 NOTE — PROGRESS NOTES
Population Health Outreach.Records Received, hyper-linked into chart at this time. The following record(s)  below were uploaded for Health Maintenance .    2/28/2022-FIT KIT

## 2022-07-13 PROBLEM — E78.5 HYPERLIPIDEMIA: Status: ACTIVE | Noted: 2022-01-01

## 2022-07-13 PROBLEM — I48.91 ATRIAL FIBRILLATION: Status: ACTIVE | Noted: 2022-01-01

## 2022-07-13 PROBLEM — D64.9 ANEMIA: Status: ACTIVE | Noted: 2022-01-01

## 2022-07-13 PROBLEM — D68.51 FACTOR V LEIDEN MUTATION: Status: ACTIVE | Noted: 2022-01-01

## 2022-07-13 PROBLEM — D89.89 ANTISYNTHETASE SYNDROME: Status: ACTIVE | Noted: 2022-01-01

## 2022-07-13 PROBLEM — J84.9 INTERSTITIAL LUNG DISEASE: Status: ACTIVE | Noted: 2021-01-19

## 2022-07-13 PROBLEM — K21.9 GASTROESOPHAGEAL REFLUX DISEASE: Status: ACTIVE | Noted: 2022-01-01

## 2022-07-13 PROBLEM — M31.6 TEMPORAL ARTERITIS: Status: ACTIVE | Noted: 2022-01-01

## 2022-07-13 PROBLEM — C44.90 MALIGNANT NEOPLASM OF SKIN: Status: ACTIVE | Noted: 2022-01-01

## 2022-07-13 PROBLEM — R25.1 TREMOR: Status: ACTIVE | Noted: 2022-01-01

## 2022-07-13 PROBLEM — E03.9 HYPOTHYROIDISM: Status: ACTIVE | Noted: 2022-01-01

## 2022-07-13 PROBLEM — E55.9 VITAMIN D DEFICIENCY: Status: ACTIVE | Noted: 2022-01-01

## 2022-07-13 PROBLEM — I82.409 DEEP VEIN THROMBOSIS (DVT): Status: ACTIVE | Noted: 2022-01-01

## 2022-07-13 NOTE — PROGRESS NOTES
Subjective:       Patient ID: Ria Holman is a 77 y.o. female.    Chief Complaint: Follow-up      Is a 77-year-old woman in for follow-up of multiple problems.  She is doing okay except for increasing shortness of breath with exertion. O2s  sats do drop as low as 75 when she walks.  It does limit her exercise.  No shortness of breath at rest her O2 sats remained normal at rest.    Also occasional lightheadedness when she stands.  One episode of near syncope about 2 weeks ago.  All heart rate has been relatively low according to a diary she brings in with her heart rate usually in the 50s and occasionally in the 40s.  I suggested she check with her electrophysiologist for perhaps adjusting the dose of Toprol down.    Follow-up      Review of Systems     Current Outpatient Medications on File Prior to Visit   Medication Sig Dispense Refill    aspirin-calcium carbonate 81 mg-300 mg calcium(777 mg) Tab Take 81 mg by mouth.      celecoxib (CELEBREX) 200 MG capsule Take 200 mg by mouth once daily.      cholecalciferol, vitamin D3, 125 mcg (5,000 unit) Tab       DOCUSATE CALCIUM ORAL       fexofenadine HCl (MUCINEX ALLERGY ORAL)       flecainide (TAMBOCOR) 100 MG Tab Take 100 mg by mouth 2 (two) times daily.      furosemide (LASIX) 40 MG tablet Take 40 mg by mouth 2 (two) times daily.      gabapentin 300 mg Tb24 Take 300 mg by mouth every evening.      hydrOXYzine pamoate (VISTARIL) 25 MG Cap Take 25 mg by mouth every evening.      levothyroxine (SYNTHROID) 50 MCG tablet TAKE ONE TABLET ONCE DAILY 30 tablet 11    loratadine (CLARITIN) 10 mg tablet Take 10 mg by mouth.      losartan (COZAAR) 50 MG tablet Take 50 mg by mouth once daily.      metoprolol succinate (TOPROL-XL) 25 MG 24 hr tablet Take 25 mg by mouth once daily.      mycophenolate (CELLCEPT) 500 mg Tab Take by mouth 2 (two) times daily.      omega-3 fatty acids/fish oil (FISH OIL-OMEGA-3 FATTY ACIDS) 300-1,000 mg capsule Take by mouth once  "daily.      omeprazole (PRILOSEC) 20 MG capsule Take 20 mg by mouth once daily.      pravastatin (PRAVACHOL) 20 MG tablet Take 20 mg by mouth every evening.      predniSONE (DELTASONE) 10 MG tablet Take 10 mg by mouth once daily.      psyllium 0.52 gram capsule Take 0.52 g by mouth 2 (two) times a day.      Saccharomyces boulardii (FLORASTOR) 250 mg capsule Take 250 mg by mouth 2 (two) times daily.      spironolactone (ALDACTONE) 25 MG tablet TAKE ONE TABLET ONCE DAILY 30 tablet 11    warfarin (COUMADIN) 2.5 MG tablet Take 2.5 mg by mouth. Sun, Mon, Wed, Fri      warfarin (COUMADIN) 5 MG tablet Take 5 mg by mouth Daily. Tues, Thurs, Sat      zolpidem (AMBIEN) 5 MG Tab TAKE ONE TABLET AT BEDTIME 30 tablet 2    [DISCONTINUED] enoxaparin (LOVENOX) 60 mg/0.6 mL Syrg Inject 0.8 mLs (80 mg total) into the skin 2 (two) times a day. 10 Syringe 1    [DISCONTINUED] folic acid/multivit-min/lutein (CENTRUM SILVER ORAL)       [DISCONTINUED] hydroCHLOROthiazide (HYDRODIURIL) 25 MG tablet Take 25 mg by mouth every morning.       No current facility-administered medications on file prior to visit.     Objective:      /82 (BP Location: Left arm, Patient Position: Sitting, BP Method: Large (Manual))   Pulse 65   Temp 96.8 °F (36 °C) (Temporal)   Resp 20   Ht 5' 3" (1.6 m)   Wt 84.4 kg (186 lb)   SpO2 97%   BMI 32.95 kg/m²     Physical Exam  Vitals reviewed.   Constitutional:       Appearance: Normal appearance.   HENT:      Head: Normocephalic.      Nose: Nose normal.      Mouth/Throat:      Pharynx: Oropharynx is clear.   Eyes:      Pupils: Pupils are equal, round, and reactive to light.   Neck:      Thyroid: No thyromegaly.   Cardiovascular:      Rate and Rhythm: Normal rate and regular rhythm.      Pulses: Normal pulses.   Pulmonary:      Comments: There are diffuse rales and squeaks in all lung fields  Abdominal:      General: Abdomen is flat. Bowel sounds are normal.      Palpations: Abdomen is soft. " There is no hepatomegaly, splenomegaly or mass.      Tenderness: There is no abdominal tenderness.   Musculoskeletal:      Cervical back: Neck supple.   Lymphadenopathy:      Cervical: No cervical adenopathy.   Skin:     General: Skin is warm and dry.   Neurological:      Mental Status: She is alert.        recent lab work okay.  Mild anemia noted.  She is on CellCept.  Actually doses increase.  His sed rate is down to essentially normal it was elevated 6 weeks ago at 62. That is when her rheumatologist increase the CellCept dose.  Other lab studies okay.    Assessment:       1. Essential hypertension    2. Atrial fibrillation, currently in sinus rhythm    3. Interstitial lung disease    4. Antisynthetase syndrome    5. Factor V Leiden mutation      1. Blood pressure control    2. She has remains in sinus rhythm  . Bradycardia.  May be contributing to lightheadedness and near-syncope    3. Seems to be getting worse and would likely benefit from low-flow oxygen with thick walking or exercise    4. Gradually progressive, recent increased dose of CellCept    5.  On anticoagulation  Plan:     will check with her pulmonologist regarding the addition of oxygen.  Meds stay the same.  Would consider reducing Toprol to half of the current dose.  Follow-up with me 6 months with CBC CMP lipid TSH prior

## 2022-07-24 NOTE — ED PROVIDER NOTES
Encounter Date: 2022    SCRIBE #1 NOTE: I, Vane Camarillo, am scribing for, and in the presence of,  Dr. Cruz. I have scribed the entire note.       History     Chief Complaint   Patient presents with    Shortness of Breath     Patient reports SOB for 3 days worse today, hx of pulmonary fibrosis, home O2     77 year old female with a hx of A fib s/p cardiac ablation on Coumadin, ILD,  and HTN presents to the ED via EMS for SOB over the past week. Pt's SOB became increasingly worse today. EMS reports the pt was found with an O2 sat in the 80's and improved to 95% on non-rebreather. . Pt feels dizzy, nauseous, and is experiencing a cough. She saw her pulmonologist on Monday and Prednisone was increased, started on Azithromyin and Bactrim and started on supplemental home oxygen.    PCP: Dr. Reinaldo Mckay   Pulmonologist: Dr. Alok Sheriff     The history is provided by the patient and the EMS personnel. No  was used.   Shortness of Breath  This is a new problem. The problem occurs frequently.The current episode started more than 2 days ago. The problem has been gradually worsening. Associated symptoms include cough. Pertinent negatives include no fever, no headaches, no sore throat, no chest pain, no vomiting and no rash. She has tried oral steroids for the symptoms. The treatment provided no relief.     Review of patient's allergies indicates:   Allergen Reactions    Codeine Itching    Doxycycline Itching and Rash    Levofloxacin Nausea Only     Other reaction(s): Acid reflux     Past Medical History:   Diagnosis Date    Atrial fibrillation     Cancer     skin cancer    Deep vein thrombosis     Diabetes mellitus     GERD (gastroesophageal reflux disease)     Hyperlipidemia     Hypertension     Hypothyroidism     Interstitial lung disease      Past Surgical History:   Procedure Laterality Date    APPENDECTOMY      BIOPSY OF ADENOIDS      BIOPSY OF TEMPORAL ARTERY        SECTION      FOOT SURGERY      FUNCTIONAL ENDOSCOPIC SINUS SURGERY (FESS)      JOINT REPLACEMENT      hip    RIGHT HEART CATHETERIZATION Right 2/25/2021    Procedure: INSERTION, CATHETER, RIGHT HEART;  Surgeon: Bridgette Cheung MD;  Location: [facility] CATH LAB;  Service: Cardiology;  Laterality: Right;    THORACOSCOPY      TONSILLECTOMY       Family History   Problem Relation Age of Onset    Diabetes Mellitus Mother     Heart failure Mother     Hypertension Mother     Heart attack Father     Hypertension Father     Hypertension Sister      Social History     Tobacco Use    Smoking status: Never Smoker    Smokeless tobacco: Never Used   Substance Use Topics    Alcohol use: Never    Drug use: Never     Review of Systems   Constitutional: Negative for chills, diaphoresis and fever.   HENT: Negative for congestion and sore throat.    Eyes: Negative for visual disturbance.   Respiratory: Positive for cough and shortness of breath.    Cardiovascular: Negative for chest pain and palpitations.   Gastrointestinal: Positive for nausea. Negative for diarrhea and vomiting.   Genitourinary: Negative for dysuria and hematuria.   Skin: Negative for rash.   Neurological: Positive for dizziness. Negative for syncope, weakness, numbness and headaches.   All other systems reviewed and are negative.      Physical Exam     Initial Vitals   BP Pulse Resp Temp SpO2   07/24/22 1714 07/24/22 1714 07/24/22 1714 07/24/22 1716 07/24/22 1714   139/82 85 (!) 32 98.4 °F (36.9 °C) 95 %      MAP       --                Physical Exam    Nursing note and vitals reviewed.  Constitutional: She appears well-developed and well-nourished. She is not diaphoretic. She does not appear ill. She appears distressed.   Pleasant elderly white female    HENT:   Head: Normocephalic and atraumatic.   Right Ear: External ear normal.   Left Ear: External ear normal.   Mouth/Throat: Oropharynx is clear and moist.   Eyes: Conjunctivae and EOM are normal.  Pupils are equal, round, and reactive to light.   Neck: Neck supple. No tracheal deviation present.   Cardiovascular: Normal rate, regular rhythm, normal heart sounds and intact distal pulses.   No murmur heard.  Pulmonary/Chest: Tachypnea noted. She is in respiratory distress (moderate). She has no wheezes. She has rhonchi in the right upper field, the right middle field, the right lower field, the left upper field, the left middle field and the left lower field. She has no rales.   Abdominal: Abdomen is soft. Bowel sounds are normal. She exhibits no distension. There is no abdominal tenderness.   No right CVA tenderness.  No left CVA tenderness.   Musculoskeletal:         General: No edema. Normal range of motion.      Cervical back: Neck supple.     Neurological: She is alert and oriented to person, place, and time. She has normal strength. No cranial nerve deficit. GCS score is 15. GCS eye subscore is 4. GCS verbal subscore is 5. GCS motor subscore is 6.   Skin: Skin is warm and dry. Capillary refill takes less than 2 seconds. No rash noted. No pallor.   Psychiatric: She has a normal mood and affect. Her behavior is normal.         ED Course   Critical Care    Date/Time: 7/24/2022 5:20 PM  Performed by: Aiyana Cruz MD  Authorized by: Aiyana Cruz MD   Direct patient critical care time: 15 minutes  Additional history critical care time: 7 minutes  Ordering / reviewing critical care time: 5 minutes  Documentation critical care time: 5 minutes  Consulting other physicians critical care time: 3 minutes  Total critical care time (exclusive of procedural time) : 35 minutes  Critical care was necessary to treat or prevent imminent or life-threatening deterioration of the following conditions: cardiac failure, sepsis, shock, circulatory failure, respiratory failure and CNS failure or compromise.  Critical care was time spent personally by me on the following activities: development of treatment plan with patient  or surrogate, discussions with primary provider, evaluation of patient's response to treatment, examination of patient, obtaining history from patient or surrogate, ordering and performing treatments and interventions, ordering and review of laboratory studies, ordering and review of radiographic studies, pulse oximetry, re-evaluation of patient's condition and review of old charts.        Labs Reviewed   COMPREHENSIVE METABOLIC PANEL - Abnormal; Notable for the following components:       Result Value    Sodium Level 133 (*)     Carbon Dioxide 22 (*)     Albumin/Globulin Ratio 1.0 (*)     Aspartate Aminotransferase 45 (*)     All other components within normal limits   B-TYPE NATRIURETIC PEPTIDE - Abnormal; Notable for the following components:    Natriuretic Peptide 175.5 (*)     All other components within normal limits   CBC WITH DIFFERENTIAL - Abnormal; Notable for the following components:    WBC 12.4 (*)     RBC 3.16 (*)     Hgb 10.6 (*)     Hct 32.7 (*)     .5 (*)     MCH 33.5 (*)     MCHC 32.4 (*)     Neut # 10.8 (*)     IG# 0.08 (*)     All other components within normal limits   PROTIME-INR - Abnormal; Notable for the following components:    PT 61.8 (*)     INR 7.52 (*)     All other components within normal limits   APTT - Abnormal; Notable for the following components:    PTT 62.2 (*)     All other components within normal limits   TROPONIN I - Normal   COVID/FLU A&B PCR - Normal   BLOOD CULTURE OLG   BLOOD CULTURE OLG   CBC W/ AUTO DIFFERENTIAL    Narrative:     The following orders were created for panel order CBC auto differential.  Procedure                               Abnormality         Status                     ---------                               -----------         ------                     CBC with Differential[[id number]]        Abnormal            Final result                 Please view results for these tests on the individual orders.      Latest Reference Range & Units  07/24/22 19:33   POC Sodium 137 - 145 mmol/l 127 !   POC Potassium 3.5 - 5.0 mmol/l 4.2   POC Ionized Calcium 1.12 - 1.23 mmol/l 1.20   POC HEMOGLOBIN 12.0 - 16.0 g/dL 10.2 !   POC PH 7.35 - 7.45  7.44   POC PCO2 35 - 45 mmHg 38   POC PO2 80 - 100 mmHg 61 !   POC HCO3 mmol/l 25.8   POC SATURATED O2 % 91.9   POC O2Hb 94.0 - 97.0 % 90.6 !   POC COHb % 2.1   POC MetHb 0.40 - 1.5 % 0.60   Specimen source  Arterial sample   Base Deficit -2.0 - 2.0 mmol/l 1.6   Corrected Temperature (pCO2) 35 - 45 mmHg 38   Corrected Temperature (pO2) 80 - 100 mmHg 61 !   Correct Temperature (PH) 7.35 - 7.45  7.44   !: Data is abnormal  EKG Readings: (Independently Interpreted)   Initial Reading: No STEMI. Rhythm: Normal Sinus Rhythm. Heart Rate: 81. Ectopy: No Ectopy. Conduction: 1st Degree AV Block (bifasicular block).   Done on 7/24/22 at 1714.        Imaging Results          X-Ray Chest AP Portable (In process)               X-Rays:   Independently Interpreted Readings:   Chest X-Ray: Normal heart size. Significantly worsened interstitial lung disease compared to prior studies      Medications   cefTRIAXone (ROCEPHIN) 1 g in dextrose 5 % in water (D5W) 5 % 50 mL IVPB (MB+) (1 g Intravenous New Bag 7/24/22 2015)   azithromycin 500 mg in dextrose 5 % 250 mL IVPB (ready to mix system) (has no administration in time range)   methylPREDNISolone sodium succinate injection 125 mg (125 mg Intravenous Given 7/24/22 1745)     Medical Decision Making:   History:   Old Medical Records: I decided to obtain old medical records.  Old Records Summarized: records from clinic visits.       <> Summary of Records: Reviewed note from pulm office last week- increased Prednisone to 40 mg daily, started on Bactrim for possible PJP, started on Azithromycin, qualified for home oxygen. CT chest negative for PE but worsening of ILD  Initial Assessment:   78 yo female with respiratory distress, currently on NRB and still labored, diffuse rhonchi present    Independently Interpreted Test(s):   I have ordered and independently interpreted X-rays - see prior notes.  I have ordered and independently interpreted EKG Reading(s) - see prior notes  Clinical Tests:   Lab Tests: Ordered and Reviewed  The following lab test(s) were unremarkable: CMP       <> Summary of Lab: Mild leukocytosis, supratheuraputic INR   Radiological Study: Ordered and Reviewed  Medical Tests: Ordered and Reviewed  ED Management:  Given Solumedrol IV and DuoNeb with some improvement   ABG with PO2 61 even on NRB  Changed to Vapotherm with marked clinical improvement  INR 7, no signs of bleeding, will hold Warfarin today   Blood cx obtained and started on Azithromycin and Rocephin   Admitted to Dr Mckay  Puladolfo consult placed for AM             Scribe Attestation:   Scribe #1: I performed the above scribed service and the documentation accurately describes the services I performed. I attest to the accuracy of the note.    Attending Attestation:           Physician Attestation for Scribe:  Physician Attestation Statement for Scribe #1: I, Dr. Cruz, reviewed documentation, as scribed by Vane Camarillo in my presence, and it is both accurate and complete.             ED Course as of 07/25/22 1146   Sun Jul 24, 2022   1903 SARS-CoV2 (COVID-19) Qualitative PCR: Not Detected [KM]   2010 Spoke with Dr Painting for admission to Dr Mckay  [KM]      ED Course User Index  [KM] Aiyana Cruz MD             Clinical Impression:   Final diagnoses:  [R06.02] Shortness of breath  [J84.9] ILD (interstitial lung disease)  [J96.01] Acute hypoxemic respiratory failure (Primary)  [R79.1] Supratherapeutic INR          ED Disposition Condition    Admit               Aiyana Cruz MD  07/25/22 1146

## 2022-07-25 NOTE — ED NOTES
Respiratory notified of pending transport. Okay to send pt to floor on 15lpm via oxymask. Tech will come to ER to transport vapotherm to room 935.

## 2022-07-25 NOTE — CONSULTS
CONSULTATION DATE: 7/25/2022          CONSULTING PHYSICIAN: Jeanna Araiza NP     REASON FOR CONSULTATION:  Infectious workup, worsening bilateral infiltrates           HISTORY OF PRESENT ILLNESS:  A 77-year-old female with a past medical history of interstitial lung disease secondary to antisynthetase syndrome who is followed by rheumatology and has been on significant immune suppression since September of last year.  She was also initiated on daily prednisone in March of this year and had been maintained on 10 mg daily for several months.  She reports developing significant dyspnea on exertion as well as hypoxia approximately 2-3 weeks ago.  She was seen at the beginning of last week by the Pulmonary team in the office who prescribed Bactrim as well as azithromycin.  Prednisone was also increased and patient was sent for a CT of the thorax.  Per report, CT was negative for PE, however showed bilateral areas of ground-glass opacities which had increased from the prior report.  Sputum culture as an outpatient showed normal respiratory katie.  COVID-19 and influenza PCRs are negative.  She continued to have worsening hypoxia and presented here for further evaluation.  She has been afebrile and hemodynamically stable, however does report ongoing dyspnea on exertion.  She is now requiring Vapotherm, currently at 40L / 90%.  She denies any significant sputum production.  Pulmonary team is following-holding off on bronchoscopy for now given tenuous pulmonary status.  She is currently receiving oral Bactrim as well as ceftriaxone.  Per discussion with patient, no known environmental exposures or recent travel or known sick contacts.  She is now retired from nursing and resides in [place] with her  at home.  No animals inside the home, however several outside dogs which are all healthy.  We have been consulted for further input.        PAST MEDICAL HISTORY:   Past Medical History:   Diagnosis Date    Atrial  fibrillation     Cancer     skin cancer    Deep vein thrombosis     Diabetes mellitus     GERD (gastroesophageal reflux disease)     Hyperlipidemia     Hypertension     Hypothyroidism     Interstitial lung disease             PAST SURGICAL HISTORY:   Past Surgical History:   Procedure Laterality Date    APPENDECTOMY      BIOPSY OF ADENOIDS      BIOPSY OF TEMPORAL ARTERY       SECTION      FOOT SURGERY      FUNCTIONAL ENDOSCOPIC SINUS SURGERY (FESS)      JOINT REPLACEMENT      hip    RIGHT HEART CATHETERIZATION Right 2021    Procedure: INSERTION, CATHETER, RIGHT HEART;  Surgeon: Bridgette Cheung MD;  Location: [facility] CATH LAB;  Service: Cardiology;  Laterality: Right;    THORACOSCOPY      TONSILLECTOMY              SOCIAL HISTORY:   Social History     Socioeconomic History    Marital status:    Tobacco Use    Smoking status: Never Smoker    Smokeless tobacco: Never Used   Substance and Sexual Activity    Alcohol use: Never    Drug use: Never            FAMILY HISTORY:   Family History   Problem Relation Age of Onset    Diabetes Mellitus Mother     Heart failure Mother     Hypertension Mother     Heart attack Father     Hypertension Father     Hypertension Sister             ALLERGIES:   Review of patient's allergies indicates:   Allergen Reactions    Codeine Itching    Doxycycline Itching and Rash    Levofloxacin Nausea Only     Other reaction(s): Acid reflux            REVIEW OF SYSTEMS: Negative unless stated above         MEDICATIONS:   Reviewed in EMR        PHYSICAL EXAM:   Vitals:    22 1616   BP:    Pulse: 80   Resp: (!) 22   Temp:       GENERAL: In bed on Vapotherm; NAD; does not appear toxic  SKIN: no rash  HEENT: sclera non-icteric; PERRL; OP without erythema or exudates  NECK: supple; no LAD  CHEST: Coarse w/crackles; mildly tachypneic although nonlabored, equal expansion  CARDIOVASCULAR: RRR, S1S2; no murmur although heart sounds difficult to  auscultate; strong, equal peripheral pulses; no edema  ABDOMEN:  active bowel sounds; abdomen soft, nondistended, nontender to palpation  GENITOURINARY: no suprapubic tenderness; no CVA tenderness   EXTREMITIES: no cyanosis or clubbing  NEURO: AAO x4; CN II-XII grossly intact  PSYCH: Mentation and affect appropriate          LABORATORY DATA: Reviewed         RADIOLOGICAL DATA:   Imaging Results          X-Ray Chest AP Portable (Final result)  Result time 07/25/22 06:31:27    Final result by Fletcher Champion MD (07/25/22 06:31:27)                 Impression:      Interval development of dense bilateral opacification is concerning for typical infectious process.  Recommend continued follow-up.      Electronically signed by: Fletcher Champion  Date:    07/25/2022  Time:    06:31             Narrative:    EXAMINATION:  XR CHEST AP PORTABLE    CLINICAL HISTORY:  Shortness of breath    TECHNIQUE:  Single view of the chest    COMPARISON:  01/20/2022    FINDINGS:  Interval development of dense bilateral opacifications.    The cardiomediastinal silhouette is within normal limits.    No acute osseous abnormality.                                      IMPRESSION:   She is a 77-year-old female with a past medical history of ILD on chronic CellCept and steroids who recently developed dyspnea on exertion as well as hypoxia.  Imaging concerning for atypical process.  Id consult for input.    1. Worsening bilateral pulmonary infiltrates, concerning for atypical infection such as PJP vs progression of ILD vs other  2. 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 Acute hypoxemic respiratory failure  4. Atrial fibrillation / history of DVT / factor V Leiden mutation on Coumadin      PLAN:  Change Bactrim to treatment dose - ok for IV form for now given need for Vapotherm.  Continue ceftriaxone for now.  F/u respiratory PCR panel and Fungitell.  Steroids per pulmonary.  Consider  bronchoscopy when stable.  Discussed with patient and daughter-in-law at bedside.

## 2022-07-25 NOTE — CONSULTS
[facility] - 9th Floor Med Surg  Pulmonary Critical Care Note    Patient Name: Ria Holman  MRN: [id number]  Admission Date: 2022  Hospital Length of Stay: 1 days  Code Status: DNR  Attending Provider: Reinaldo Mckay MD  Primary Care Provider: Reinaldo Mckay MD     Subjective:     HPI:   This is a 77-year-old female patient of Dr. 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 She also has a history of DVT in her right lower extremity and factor V Leiden mutation as well as atrial fibrillation, on coumadin. Most recently had been on Cellcept and prednisone by Dr Branch. She had been experiencing increasing dyspnea and cough and was seen in our clinic last week. O2 levels were low and she was initiated on oxygen, started on bactrim and azithromyin and increased her prednisone. A CT scan was obtained at outside hospital which revealed no PE but showed patchy areas of groundglass increasing compared to 2022 per report. She had low oxygen levels that persisted at home despite nasal cannula and she was admitted on 22. Workup revealed anemia, elevated INR, and hypoxemia. Her respiratory culture from last week is with normal respiratory katie and COVID and flu swabs are negative. She is currently on Vapotherm 90% FIO2, 40LPM and oxygen level dropping to the 50s with mobilization. Pulmonary being consulted for evaluation.         Past Medical History:   Diagnosis Date    Atrial fibrillation     Cancer     skin cancer    Deep vein thrombosis     Diabetes mellitus     GERD (gastroesophageal reflux disease)     Hyperlipidemia     Hypertension     Hypothyroidism     Interstitial lung disease        Past Surgical History:   Procedure Laterality Date    APPENDECTOMY      BIOPSY OF ADENOIDS      BIOPSY OF TEMPORAL ARTERY       SECTION      FOOT SURGERY      FUNCTIONAL ENDOSCOPIC SINUS SURGERY (FESS)      JOINT  REPLACEMENT      hip    RIGHT HEART CATHETERIZATION Right 2/25/2021    Procedure: INSERTION, CATHETER, RIGHT HEART;  Surgeon: Bridgette Cheung MD;  Location: [facility] CATH LAB;  Service: Cardiology;  Laterality: Right;    THORACOSCOPY      TONSILLECTOMY         Social History     Socioeconomic History    Marital status:    Tobacco Use    Smoking status: Never Smoker    Smokeless tobacco: Never Used   Substance and Sexual Activity    Alcohol use: Never    Drug use: Never           Objective:     Current Outpatient Medications   Medication Instructions    cholecalciferol, vitamin D3, 125 mcg (5,000 unit) Tab No dose, route, or frequency recorded.    DOCUSATE CALCIUM ORAL No dose, route, or frequency recorded.    flecainide (TAMBOCOR) 100 mg, Oral, 2 times daily    gabapentin (NEURONTIN) 300 MG capsule TAKE ONE CAPSULE TWICE DAILY    gabapentin 300 mg, Oral, Nightly    hydrOXYzine pamoate (VISTARIL) 25 mg, Oral, Nightly    levothyroxine (SYNTHROID) 50 MCG tablet TAKE ONE TABLET ONCE DAILY    loratadine (CLARITIN) 10 mg, Oral    losartan (COZAAR) 50 mg, Oral, Daily    metoprolol succinate (TOPROL-XL) 25 mg, Oral, Daily    mycophenolate (CELLCEPT) 500 mg Tab Oral, 2 times daily    omeprazole (PRILOSEC) 20 mg, Oral, Daily    pravastatin (PRAVACHOL) 20 mg, Oral, Nightly    psyllium 0.52 g, Oral, 2 times daily    Saccharomyces boulardii (FLORASTOR) 250 mg, Oral, 2 times daily    spironolactone (ALDACTONE) 25 MG tablet TAKE ONE TABLET ONCE DAILY    sulfamethoxazole-trimethoprim 800-160mg (BACTRIM DS) 800-160 mg Tab 1 tablet, Oral, 2 times daily    zolpidem (AMBIEN) 5 MG Tab TAKE ONE TABLET AT BEDTIME       Current Inpatient Medications   aspirin  81 mg Oral Daily    cefTRIAXone (ROCEPHIN) IVPB  1 g Intravenous Q24H    docusate calcium  240 mg Oral Daily    famotidine  20 mg Oral BID    flecainide  100 mg Oral BID    furosemide (LASIX) injection  40 mg Intravenous Once    gabapentin  300 mg  Oral BID    hydrOXYzine pamoate  25 mg Oral QHS    Lactobacillus acidophilus  1 capsule Oral TID WM    levothyroxine  50 mcg Oral Daily    losartan  50 mg Oral Daily    methylPREDNISolone sodium succinate injection  80 mg Intravenous Q8H    metoprolol succinate  25 mg Oral Daily    mycophenolate  500 mg Oral BID    pantoprazole  40 mg Oral Daily    phytonadione (vitamin K1)  5 mg Oral Once    polyethylene glycol  17 g Oral Daily    pravastatin  20 mg Oral QHS    psyllium husk (with sugar)  1 packet Oral Daily    spironolactone  25 mg Oral Daily    sulfamethoxazole-trimethoprim 800-160mg  1 tablet Oral BID    vitamin D  5,000 Units Oral Daily    zolpidem  5 mg Oral Nightly           Intake/Output Summary (Last 24 hours) at 7/25/2022 0957  Last data filed at 7/24/2022 2120  Gross per 24 hour   Intake 0 ml   Output --   Net 0 ml       Review of Systems   Respiratory: Positive for cough and shortness of breath.         Vital Signs (Most Recent):  Temp: 97.5 °F (36.4 °C) (07/25/22 0841)  Pulse: 69 (07/25/22 0841)  Resp: 20 (07/25/22 0841)  BP: (!) 150/71 (07/25/22 0841)  SpO2: 95 % (07/25/22 0841)  Body mass index is 32.77 kg/m².  Weight: 83.9 kg (185 lb) Vital Signs (24h Range):  Temp:  [97.5 °F (36.4 °C)-98.4 °F (36.9 °C)] 97.5 °F (36.4 °C)  Pulse:  [69-85] 69  Resp:  [20-32] 20  SpO2:  [90 %-98 %] 95 %  BP: (134-150)/(46-82) 150/71     Physical Exam  Vitals reviewed.   Constitutional:       Appearance: Normal appearance.   HENT:      Head: Normocephalic and atraumatic.   Cardiovascular:      Rate and Rhythm: Normal rate.      Heart sounds: Normal heart sounds.   Pulmonary:      Comments: Scattered crackles  Abdominal:      Palpations: Abdomen is soft.   Musculoskeletal:      Cervical back: Neck supple.   Neurological:      Mental Status: She is alert.           Lines/Drains/Airways     Peripheral Intravenous Line  Duration                Peripheral IV - Single Lumen 07/24/22 1740 20 G  Anterior;Proximal;Right;Lateral Forearm <1 day                Significant Labs:    Lab Results   Component Value Date    WBC 12.4 (H) 07/24/2022    HGB 10.6 (L) 07/24/2022    HCT 32.7 (L) 07/24/2022    .5 (H) 07/24/2022     07/24/2022         BMP  Lab Results   Component Value Date     (L) 07/24/2022    K 4.1 07/24/2022    CL 98 02/25/2021    CO2 22 (L) 07/24/2022    BUN 19.0 07/24/2022    CREATININE 0.83 07/24/2022    CALCIUM 9.2 07/24/2022    ANIONGAP 5 (L) 02/25/2021    ESTGFRAFRICA >60.0 02/25/2021    EGFRNONAA >60 07/24/2022       ABG  Recent Labs   Lab 07/24/22 1933   PH 7.44   PO2 61*   PCO2 38   HCO3 25.8           Significant Imaging:  I have reviewed all pertinent imaging within the past 24 hours.        Assessment/Plan:     Assessment  1. Pulmonary fibrosis s/t  antisynthetase syndrome, on Cellcept and prednisone per rheumatology  2. Worsening bilateral infiltrates on imaging -- possible progression of ILD vs infectious vs inflammatory vs DAH  3. Acute hypoxemic respiratory failure s/t above  4. Anemia and elevated INR  5. Atrial fibrillation on coumadin  6. Pulmonary HTN  7. History of DVT and factor V Leiden mutation, on coumadin      Plan  Continue broad spectrum antibiotics, respiratory culture from last week negative  Fungitell, Resp PCR, MRSA PCR all pending  Consult ID for their recommendation  Holding anticoagulation and vitamin K was given, cannot rule out alveolar hemorrhage with elevated INR and pulmonary infiltrates with anemia, too unstable for bronchoscopy at this time  Increasing IV steroids  Will attempt weaning oxygen if possible  Long discussion with patient at bedside, if condition worsens she does not want CPR or to be placed on mechanical ventilation, will change code status to DNR           JEFERSON Pride  Pulmonary Critical Care Medicine  [facility] - 9th Floor Med Surg

## 2022-07-25 NOTE — CONSULTS
Cardiovascular Admission H&P    Patient Name: Ria Holman  Age: 77 y.o.  : 1945  MRN: [id number]  Admission Date: 2022  ?  Chief Complaint:   Chief Complaint   Patient presents with    Shortness of Breath     Patient reports SOB for 3 days worse today, hx of pulmonary fibrosis, home O2       History of Present Illness:  Ria Holman is a 77 y.o. female, retired ICU RN from [employer], followed by Dr. Saleh in cardiology clinic with rather complex history including Afib with prior ablation (previously transitioned from sotalol to Multaq to Flecainide + metoprolol). No recurrent Afib since transition to Flecainide. She also has a history of DVT and was diagnosed with Factor V Leiden in the past. She has a history of temporal arteritis (biopsy proven).  She has been treated long term with coumadin therapy. Also with  diagnosis of pulmonary fibrosis.     She has chronic SOB with diagnosis of interstitial lung disease with autommune features by lung biopsy in 3/2020. Prior 2D echo was concerning for pulmonary HTN. She is status post RHC at [facility] in the past with only mild pulmonary HTN noted; PAWP was elevated. She notes chronic, gravity dependent R>L LE edema (prior RLE DVT noted).     Echo 3/2022  EF > 60% with mild to moderate AI and mild TR (RVSP 33 mm Hg).    RHC 21 Dr. Bridgette Hinds   The estimated blood loss was < 50mL.   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   With evidence PCWP clearly beronica.   Recommended adding aldactone, increasing afterload reduction, and repeat sleep apnea testing at that time.      Patient admitted on 2022 with progressive SOB/SAMUEL despite recent initiation of home O2.  Workup has revealed progression of pulmonary infiltrates; started on   Bactrim and higher doses of prednisone as outpatient.      Despite these measures, her symptoms progressed and became severe and limiting.  She denied fever or chills,  chest discomfort, orthopnea, and PND.  Mild LE edema noted.      Labs significant for INR 7.52, creatinine 0.83, mildly elevated BNP (175.5), leukocytosis (12.4 - on steroids), and anemia (hemoglobin 10.6).  CXR showed interval development of dense bilateral opacification concerning for typical infectious process.      She was on 90% NC Vapotherm O2 when I rounded.      Patient Active Problem List   Diagnosis    Chronic cardiopulmonary disease    Pulmonary hypertension    Interstitial lung disease    Shortness of breath    Atrial fibrillation, currently in sinus rhythm    Primary hypertension    Anemia    Antisynthetase syndrome    Atrial fibrillation    Deep vein thrombosis (DVT)    Factor V Leiden mutation    Gastroesophageal reflux disease    Hyperlipidemia    Malignant neoplasm of skin    Temporal arteritis    Hypothyroidism    Tremor    Vitamin D deficiency       Review of Systems   Constitutional: Positive for malaise/fatigue. Negative for fever.   HENT: Negative for congestion and nosebleeds.    Eyes: Negative for blurred vision and visual disturbance.   Cardiovascular: Negative for chest pain, dyspnea on exertion, near-syncope, palpitations, paroxysmal nocturnal dyspnea and syncope.   Respiratory: Positive for cough and shortness of breath. Negative for hemoptysis.    Endocrine: Negative for cold intolerance and heat intolerance.   Hematologic/Lymphatic: Negative for bleeding problem. Bruises/bleeds easily.   Skin: Negative for itching and rash.   Musculoskeletal: Negative for back pain, joint pain and myalgias.   Gastrointestinal: Negative for abdominal pain, hematemesis, hematochezia, nausea and vomiting.   Genitourinary: Negative for dysuria and hematuria.   Neurological: Negative for dizziness, headaches and vertigo.   Psychiatric/Behavioral: Negative for depression and hallucinations. The patient is not nervous/anxious.    Allergic/Immunologic: Negative for hives and persistent  infections.   All other systems reviewed and are negative.      Health Status  Review of patient's allergies indicates:   Allergen Reactions    Codeine Itching    Doxycycline Itching and Rash    Levofloxacin Nausea Only     Other reaction(s): Acid reflux       Past Medical History:   Diagnosis Date    Atrial fibrillation     Cancer     skin cancer    Deep vein thrombosis     Diabetes mellitus     GERD (gastroesophageal reflux disease)     Hyperlipidemia     Hypertension     Hypothyroidism     Interstitial lung disease        Current Facility-Administered Medications   Medication Dose Route Frequency Provider Last Rate Last Admin    acetaminophen tablet 650 mg  650 mg Oral Q8H PRN Reinaldo Mckay MD        acetaminophen tablet 650 mg  650 mg Oral Q8H PRN Reinaldo Mckay MD        aspirin chewable tablet 81 mg  81 mg Oral Daily Reinaldo Mckay MD   81 mg at 07/25/22 1033    cefTRIAXone (ROCEPHIN) 1 g in dextrose 5 % in water (D5W) 5 % 50 mL IVPB (MB+)  1 g Intravenous Q24H Reinaldo Mckay MD        docusate calcium capsule 240 mg  240 mg Oral Daily Reinaldo Mckay MD        famotidine tablet 20 mg  20 mg Oral BID Reinaldo Mckay MD   20 mg at 07/25/22 1033    flecainide tablet 100 mg  100 mg Oral BID Reinaldo Mckay MD        gabapentin capsule 300 mg  300 mg Oral BID Reinaldo Mckay MD   300 mg at 07/25/22 1033    HYDROcodone-acetaminophen 5-325 mg per tablet 1 tablet  1 tablet Oral Q4H PRN Reinaldo Mckay MD        hydrOXYzine pamoate capsule 25 mg  25 mg Oral QHS Reinaldo Mckay MD        Lactobacillus acidophilus capsule 1 capsule  1 capsule Oral TID  Reinaldo Mckay MD   1 capsule at 07/25/22 1347    levothyroxine tablet 50 mcg  50 mcg Oral Daily Reinaldo Mckay MD   50 mcg at 07/25/22 1033    losartan tablet 50 mg  50 mg Oral Daily Reinaldo Mckay MD   50 mg at 07/25/22 1033    melatonin tablet 6 mg  6 mg Oral Nightly  PRN Reinaldo Mckay MD        methylPREDNISolone sodium succinate injection 80 mg  80 mg Intravenous Q8H JEFERSON Pride   80 mg at 22 1034    metoprolol succinate (TOPROL-XL) 24 hr tablet 25 mg  25 mg Oral Daily Reinaldo Mckay MD   25 mg at 22 1032    mycophenolate capsule 500 mg  500 mg Oral BID Reinaldo Mckay MD   500 mg at 22 1033    ondansetron injection 4 mg  4 mg Intravenous Q6H PRN Reinaldo Mckay MD        pantoprazole EC tablet 40 mg  40 mg Oral Daily Reinaldo Mckay MD   40 mg at 22 1033    phytonadione (vitamin K1) tablet 5 mg  5 mg Oral Once Reinaldo Mckay MD        polyethylene glycol packet 17 g  17 g Oral Daily Reinaldo Mckay MD   17 g at 22 1034    pravastatin tablet 20 mg  20 mg Oral QHS Reinaldo Mckay MD        psyllium husk (with sugar) 3.4 gram packet 1 packet  1 packet Oral Daily Reinaldo Mckay MD        sodium chloride 0.9% flush 10 mL  10 mL Intravenous PRN Reinaldo Mckay MD        spironolactone tablet 25 mg  25 mg Oral Daily Reinaldo Mckay MD   25 mg at 22 1034    sulfamethoxazole-trimethoprim 400-80 mg/5 mL (BACTRIM) 419.2 mg in dextrose 5 % 655 mL IVPB  20 mg/kg/day Intravenous Q6H JEFERSON Ramirez        vitamin D 1000 units tablet 5,000 Units  5,000 Units Oral Daily Reinaldo Mckay MD   5,000 Units at 22 1033    zolpidem tablet 5 mg  5 mg Oral Nightly Reinaldo Mckay MD           Family History   Problem Relation Age of Onset    Diabetes Mellitus Mother     Heart failure Mother     Hypertension Mother     Heart attack Father     Hypertension Father     Hypertension Sister        Past Surgical History:   Procedure Laterality Date    APPENDECTOMY      BIOPSY OF ADENOIDS      BIOPSY OF TEMPORAL ARTERY       SECTION      FOOT SURGERY      FUNCTIONAL ENDOSCOPIC SINUS SURGERY (FESS)      JOINT REPLACEMENT      hip    RIGHT HEART  "CATHETERIZATION Right 2/25/2021    Procedure: INSERTION, CATHETER, RIGHT HEART;  Surgeon: Bridgette Cheung MD;  Location: [facility] CATH LAB;  Service: Cardiology;  Laterality: Right;    THORACOSCOPY      TONSILLECTOMY         Social History     Socioeconomic History    Marital status:    Tobacco Use    Smoking status: Never Smoker    Smokeless tobacco: Never Used   Substance and Sexual Activity    Alcohol use: Never    Drug use: Never           Patient Vitals for the past 8 hrs:   BP Temp Temp src Pulse Resp SpO2   07/25/22 1636 (!) 146/59 99.7 °F (37.6 °C) Oral 77 -- (!) 93 %   07/25/22 1616 -- -- -- 80 (!) 22 95 %   07/25/22 1347 133/66 99.2 °F (37.3 °C) Axillary 80 (!) 28 --   07/25/22 1034 (!) 150/71 -- -- -- -- --   07/25/22 1033 (!) 150/71 -- -- -- -- --   07/25/22 1032 (!) 150/71 -- -- 69 -- --        Estimated body surface area is 1.93 meters squared as calculated from the following:    Height as of this encounter: 5' 3" (1.6 m).    Weight as of this encounter: 83.9 kg (185 lb).  Physical Exam  Constitutional:       General: She is not in acute distress.     Appearance: She is underweight. She is ill-appearing.   HENT:      Head: Normocephalic and atraumatic.      Nose: Nose normal. No congestion or rhinorrhea.      Mouth/Throat:      Mouth: Mucous membranes are moist.      Pharynx: Oropharynx is clear.   Eyes:      Extraocular Movements: Extraocular movements intact.      Conjunctiva/sclera: Conjunctivae normal.   Neck:      Vascular: No carotid bruit or JVD.   Cardiovascular:      Rate and Rhythm: Normal rate and regular rhythm.      Heart sounds: Normal heart sounds. No murmur heard.    No friction rub. No gallop.   Pulmonary:      Effort: Pulmonary effort is normal. No respiratory distress.      Breath sounds: Examination of the right-lower field reveals rales. Examination of the left-lower field reveals rales. Decreased breath sounds and rales present. No wheezing or rhonchi.   Abdominal:      " General: Abdomen is flat. Bowel sounds are normal.      Palpations: Abdomen is soft.      Tenderness: There is no abdominal tenderness.   Musculoskeletal:         General: No swelling or tenderness.      Cervical back: Neck supple.      Right lower leg: No edema.      Left lower leg: No edema.   Skin:     Coloration: Skin is not jaundiced or pale.      Findings: No erythema or rash.   Neurological:      General: No focal deficit present.      Mental Status: She is alert and oriented to person, place, and time.   Psychiatric:         Mood and Affect: Mood normal.         Behavior: Behavior normal.         Thought Content: Thought content normal.         Judgment: Judgment normal.         Recent Results (from the past 48 hour(s))   Comprehensive metabolic panel    Collection Time: 07/24/22  5:57 PM   Result Value Ref Range    Sodium Level 133 (L) 136 - 145 mmol/L    Potassium Level 4.1 3.5 - 5.1 mmol/L    Chloride 100 98 - 107 mmol/L    Carbon Dioxide 22 (L) 23 - 31 mmol/L    Glucose Level 103 82 - 115 mg/dL    Blood Urea Nitrogen 19.0 9.8 - 20.1 mg/dL    Creatinine 0.83 0.55 - 1.02 mg/dL    Calcium Level Total 9.2 8.4 - 10.2 mg/dL    Protein Total 6.9 5.8 - 7.6 gm/dL    Albumin Level 3.4 3.4 - 4.8 gm/dL    Globulin 3.5 2.4 - 3.5 gm/dL    Albumin/Globulin Ratio 1.0 (L) 1.1 - 2.0 ratio    Bilirubin Total 0.5 <=1.5 mg/dL    Alkaline Phosphatase 92 40 - 150 unit/L    Alanine Aminotransferase 21 0 - 55 unit/L    Aspartate Aminotransferase 45 (H) 5 - 34 unit/L    Estimated GFR-Non  >60 mls/min/1.73/m2   Troponin I    Collection Time: 07/24/22  5:57 PM   Result Value Ref Range    Troponin-I 0.029 0.000 - 0.045 ng/mL   Brain natriuretic peptide    Collection Time: 07/24/22  5:57 PM   Result Value Ref Range    Natriuretic Peptide 175.5 (H) <=100.0 pg/mL   CBC with Differential    Collection Time: 07/24/22  5:58 PM   Result Value Ref Range    WBC 12.4 (H) 4.5 - 11.5 x10(3)/mcL    RBC 3.16 (L) 4.20 - 5.40  x10(6)/mcL    Hgb 10.6 (L) 12.0 - 16.0 gm/dL    Hct 32.7 (L) 37.0 - 47.0 %    .5 (H) 80.0 - 94.0 fL    MCH 33.5 (H) 27.0 - 31.0 pg    MCHC 32.4 (L) 33.0 - 36.0 mg/dL    RDW 12.8 11.5 - 17.0 %    Platelet 195 130 - 400 x10(3)/mcL    MPV 9.7 7.4 - 10.4 fL    Neut % 86.7 %    Lymph % 6.3 %    Mono % 4.3 %    Eos % 1.9 %    Basophil % 0.2 %    Lymph # 0.78 0.6 - 4.6 x10(3)/mcL    Neut # 10.8 (H) 2.1 - 9.2 x10(3)/mcL    Mono # 0.53 0.1 - 1.3 x10(3)/mcL    Eos # 0.23 0 - 0.9 x10(3)/mcL    Baso # 0.02 0 - 0.2 x10(3)/mcL    IG# 0.08 (H) 0 - 0.04 x10(3)/mcL    IG% 0.6 %    NRBC% 0.0 %   COVID/FLU A&B PCR    Collection Time: 07/24/22  6:09 PM   Result Value Ref Range    Influenza A PCR Not Detected Not Detected    Influenza B PCR Not Detected Not Detected    SARS-CoV-2 PCR Not Detected Not Detected   Protime-INR    Collection Time: 07/24/22  6:27 PM   Result Value Ref Range    PT 61.8 (H) 12.5 - 14.5 seconds    INR 7.52 (HH) 0.00 - 1.30   APTT    Collection Time: 07/24/22  6:27 PM   Result Value Ref Range    PTT 62.2 (H) 23.2 - 33.7 seconds   POCT ARTERIAL BLOOD GAS    Collection Time: 07/24/22  7:33 PM   Result Value Ref Range    POC PH 7.44 7.35 - 7.45    POC PCO2 38 35 - 45 mmHg    POC PO2 61 (A) 80 - 100 mmHg    POC HEMOGLOBIN 10.2 (A) 12.0 - 16.0 g/dL    POC SATURATED O2 91.9 %    POC O2Hb 90.6 (A) 94.0 - 97.0 %    POC COHb 2.1 %    POC MetHb 0.60 0.40 - 1.5 %    POC Potassium 4.2 3.5 - 5.0 mmol/l    POC Sodium 127 (A) 137 - 145 mmol/l    POC Ionized Calcium 1.20 1.12 - 1.23 mmol/l    Correct Temperature (PH) 7.44 7.35 - 7.45    Corrected Temperature (pCO2) 38 35 - 45 mmHg    Corrected Temperature (pO2) 61 (A) 80 - 100 mmHg    POC HCO3 25.8 mmol/l    Base Deficit 1.6 -2.0 - 2.0 mmol/l    POC Temp 37.0 °C    Specimen source Arterial sample    MRSA PCR    Collection Time: 07/25/22  2:24 PM   Result Value Ref Range    MRSA PCR SCRN ([facility]) Not Detected Not Detected             Assesment/Plan:  1.  Acute hypoxic  respiratory failure in the setting of pulmonary fibrosis.  CXR significantly abnormal.  Suspect primary pulmonary process without much contribution from CHF noting no JVP or LE edema and BNP only mildly elevated.  Last echo showed normal LV systolic function with normal PA systolic pressure.  OK for gentle diuresis but would not recommend aggressive diuresis at this point.  Pulmonary and ID following; on antibiotic therapy.    2.  PAF.  Continue flecainide and metoprolol as tolerated.  3.  History of PAF, DVT, Factor V Leiden.  Hold coumadin given supratherapeutic INR.  Resume when INR < 2.5.  4.  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      Washington Sheehan  [facility]

## 2022-07-25 NOTE — H&P
[facility]                       [address] [place] [address]    PATIENT NAME:       NICOLE LANDEROS  YOB: 1945  CSN:                [id number]   MRN:                [id number]  ADMIT DATE:         07/24/2022 17:17:00  PHYSICIAN:          Reinaldo Mckay MD                        HISTORY AND PHYSICAL      CHIEF COMPLAINT:  Worsening shortness of breath.    HISTORY OF PRESENT ILLNESS:  The patient is a 77-year-old woman with a very   complicated medical history including pulmonary fibrosis, antisynthetase   syndrome, hypertension, atrial fibrillation, hypercoagulable state, and numerous   other problems who has been getting gradually more and more short of breath   with exertion.  I saw her in the office a couple of weeks ago, at which time we   recommended the addition of home oxygen.  Later, she was seen by the   pulmonologist nurse practitioner.  Increasing amounts of home oxygen were added.    Also, workup revealed progression of her infiltrates, and she was started on   Bactrim and higher doses of prednisone.  It was felt she may have Pneumocystis   carinii pneumonia.  Sputums, I think, have been unremarkable.  Despite these   measures, her symptoms progressed and became extremely severe yesterday   afternoon.  Her shortness of breath at rest was at the point that she could not   walk to the car, and she was brought to the hospital by ambulance.  She was   given some intravenous antibiotics and IV steroids and perhaps a dose of   diuretic.  Overnight, she has improved significantly.    She has not had fever or chills.  She has not had chest pain.  She does have   some mild dependent edema, but that has not changed.  The dry cough had worsened   but has improved overnight.    MEDICATIONS:  Reviewed.  These include:  1. Aspirin.  2. Celebrex.  3. Vitamin D.  4. Colace.  5. Tambocor.  6. Lasix daily.  7.  Gabapentin.  8. Vistaril.  9. Synthroid.  10. Cozaar.  11. Toprol.  12. CellCept.  13. Omeprazole.  14. Pravastatin.  15. Deltasone, which was recently bumped up to 40 mg daily.  16. Coumadin.  17. Multivitamins.  18. Aldactone.    ALLERGIES:  CODEINE, DOXYCYCLINE, AND LEVAQUIN.     PAST MEDICAL HISTORY:  Extensive and includes the pulmonary fibrosis, followed   by Dr. Sheriff.  She also has been diagnosed with an antisynthetase syndrome,   followed by Dr. Murcia.  She is on immunosuppressant therapy.  Other problems   include polymyalgia rheumatica several years ago.  She has also been plagued   with hypertension and has had atrial fibrillation, status post ablation.  She   has a history of factor V Leiden mutation with DVT.    SOCIAL HISTORY:  She does not smoke or use alcohol in excess.    She is a retired nurse.  She was active until this lung condition hit.    REVIEW OF SYSTEMS:  CONSTITUTIONAL:  Weight is stable, and as noted in HPI, no fever or chills.  HEENT:  No unusual headaches and no dysphagia.  ENDOCRINE:  She has had hypothyroidism, but no diabetes.  CARDIOVASCULAR:  No recent chest pain.  No orthopnea, PND, pedal edema.  RESPIRATORY:  See HPI.  GI:  No nausea or vomiting except some nausea yesterday.  No change in bowel   habits.  No blood in the stools.  :  No urgency, frequency, dysuria.    PHYSICAL EXAMINATION:  VITAL SIGNS:  She is afebrile.  Blood pressure today was 145/68, heart rate 72,   respiratory rate 20, O2 sat 92%.  She is on a Vapotherm device.  HEENT:  Grossly unremarkable.  NECK:  Supple without thyromegaly or adenopathy.  No obvious jugular venous   distention noted.  HEART:  Has a regular rate and rhythm.  LUNGS:  With rales fairly diffusely bilaterally, which is chronic.  ABDOMEN:  Nontender.  EXTREMITIES:  Without clubbing, cyanosis, or edema.    LABORATORY STUDIES:  Included an elevated BNP at 175.  ProTime was elevated at   62 with an INR of 7.5. CBC  revealed a white count of  12,000 and H and H of 10.6   and 32.  Chemistries are essentially unremarkable.  Chest x-ray reveals diffuse   infiltrates.    IMPRESSION:    1. Respiratory distress.  She has had a progression of her pulmonary fibrosis.    Perhaps there is a secondary infection or atypical infection.  There may be an   element of congestive heart failure.  Doubt thrombotic event given her excess   anticoagulation.  2. History of pulmonary fibrosis.  3. History of antisynthetase syndrome.  4. History of atrial fibrillation, status post ablation.  She remains in sinus.  5. Hypertension.  6. History of deep vein thrombosis and factor V Leiden mutation.  7. Corrected hypothyroidism.    PLAN:    1. She has been admitted for further evaluation and treatment.  2. Pulmonary has been consulted, and I will also consult her cardiologist.  3. In the meantime, I will keep her on increased doses of steroids as well as a   p.o. Bactrim and empiric Rocephin.  4. I will also give her a one-time dose of IV Lasix.  5. I will also give her a small dose of vitamin K to help correct the Coumadin   toxicity.        ______________________________  Reinaldo Mckay MD MSA/KARINA  DD:  07/25/2022  Time:  08:31AM  DT:  07/25/2022  Time:  09:21AM  Job #:  [id number]      HISTORY AND PHYSICAL

## 2022-07-26 NOTE — PROGRESS NOTES
Cardiology Daily Progress Note    Patient Name: Ria Holman  Age: 77 y.o.  : 1945  MRN: [id number]  Admission Date: 2022      Subjective: No acute cardiac events overnight. Remains on high flow oxygen therapy. Complaints of SOB. No chest discomfort or dizziness. No palpitations.       Review of Systems - General ROS: negative.  Respiratory ROS: no cough, + shortness of breath, + wheezing.  Cardiovascular ROS: no chest pain, + dyspnea on exertion.  Gastrointestinal ROS: no abdominal pain, change in bowel habits, or black or bloody stools.  Genito-Urinary ROS: no dysuria, trouble voiding, or hematuria.  Musculoskeletal ROS: negative.  Neurological ROS: negative.      Health Status:  Review of patient's allergies indicates:   Allergen Reactions    Codeine Itching    Doxycycline Itching and Rash    Levofloxacin Nausea Only     Other reaction(s): Acid reflux       Current Facility-Administered Medications   Medication Dose Route Frequency Provider Last Rate Last Admin    acetaminophen tablet 650 mg  650 mg Oral Q8H PRN Reinaldo Mckay MD        acetaminophen tablet 650 mg  650 mg Oral Q8H PRN Reinaldo Mckay MD        ALPRAZolam tablet 0.25 mg  0.25 mg Oral TID PRN Jeanna Araiza, FNP   0.25 mg at 22 1412    aspirin chewable tablet 81 mg  81 mg Oral Daily Reinaldo Mckay MD   81 mg at 22 0908    cefTRIAXone (ROCEPHIN) 1 g in dextrose 5 % in water (D5W) 5 % 50 mL IVPB (MB+)  1 g Intravenous Q24H Reinaldo Mckay MD   Stopped at 22 2214    docusate calcium capsule 240 mg  240 mg Oral Daily Reinaldo Mckay MD   240 mg at 22 0908    famotidine tablet 20 mg  20 mg Oral BID Reinaldo Mckay MD   20 mg at 22 2145    flecainide tablet 100 mg  100 mg Oral BID Reinaldo Mckay MD   100 mg at 22 0909    furosemide tablet 40 mg  40 mg Oral Daily Reinaldo Mckay MD   40 mg at 22 0911    gabapentin capsule 300 mg  300 mg Oral  BID Reinaldo Mckay MD   300 mg at 07/26/22 0908    HYDROcodone-acetaminophen 5-325 mg per tablet 1 tablet  1 tablet Oral Q4H PRN Reinaldo Mckay MD        hydrOXYzine pamoate capsule 25 mg  25 mg Oral QHS Reinaldo Mckay MD   25 mg at 07/25/22 2147    Lactobacillus acidophilus capsule 1 capsule  1 capsule Oral TID  Reinaldo Mckay MD   1 capsule at 07/26/22 1111    [START ON 7/27/2022] levothyroxine tablet 50 mcg  50 mcg Oral Before breakfast Reinaldo Mckay MD        losartan tablet 50 mg  50 mg Oral Daily Reinaldo Mckay MD   50 mg at 07/26/22 0907    melatonin tablet 6 mg  6 mg Oral Nightly PRN Reinaldo Mckay MD        methylPREDNISolone sodium succinate injection 80 mg  80 mg Intravenous Q8H JEFERSON Pride   80 mg at 07/26/22 1111    metoprolol succinate (TOPROL-XL) 24 hr tablet 25 mg  25 mg Oral Daily Reinaldo Mckay MD   25 mg at 07/26/22 0908    mycophenolate capsule 1,000 mg  1,000 mg Oral BID Reinaldo Mckay MD        ondansetron injection 4 mg  4 mg Intravenous Q6H PRN Reinaldo Mckay MD   4 mg at 07/26/22 0636    pantoprazole EC tablet 40 mg  40 mg Oral Daily Reinaldo Mckay MD   40 mg at 07/26/22 0908    polyethylene glycol packet 17 g  17 g Oral Daily Reinaldo Mckay MD   17 g at 07/25/22 1034    pravastatin tablet 20 mg  20 mg Oral QHS Reinaldo Mckay MD   20 mg at 07/25/22 2148    psyllium husk (with sugar) 3.4 gram packet 1 packet  1 packet Oral Daily Reinaldo Mckay MD   1 packet at 07/26/22 0910    sodium chloride 0.9% flush 10 mL  10 mL Intravenous PRN Reinaldo Mckay MD        spironolactone tablet 25 mg  25 mg Oral Daily Reinaldo Mckay MD   25 mg at 07/26/22 0907    sulfamethoxazole-trimethoprim 400-80 mg/5 mL (BACTRIM) 419.2 mg in dextrose 5 % 655 mL IVPB  20 mg/kg/day Intravenous Q6H JEFERSON Ramirez   Stopped at 07/26/22 1240    vitamin D 1000 units tablet 5,000 Units  5,000  Units Oral Daily Reinaldo Mckay MD   5,000 Units at 07/26/22 1110    zolpidem tablet 5 mg  5 mg Oral Nightly Reinaldo Mckay MD   5 mg at 07/25/22 8977       Objective:  Patient Vitals for the past 24 hrs:   BP Temp Temp src Pulse Resp SpO2   07/26/22 1129 126/65 98.3 °F (36.8 °C) Axillary 69 -- --   07/26/22 0911 133/68 -- -- -- -- --   07/26/22 0908 133/68 -- -- 71 -- --   07/26/22 0907 133/68 -- -- -- -- --   07/26/22 0854 133/68 98 °F (36.7 °C) Axillary 71 20 --   07/26/22 0453 102/65 97.6 °F (36.4 °C) Oral (!) 53 (!) 22 (!) 94 %   07/26/22 0023 -- -- -- 63 17 (!) 89 %   07/25/22 2301 (!) 114/57 98.6 °F (37 °C) Oral 64 18 96 %   07/25/22 1933 -- -- -- 72 (!) 21 (!) 94 %   07/25/22 1903 (!) 143/61 98 °F (36.7 °C) Oral 78 19 (!) 91 %   07/25/22 1636 (!) 146/59 99.7 °F (37.6 °C) Oral 77 -- (!) 93 %   07/25/22 1616 -- -- -- 80 (!) 22 95 %     Recent Results (from the past 24 hour(s))   BNP    Collection Time: 07/26/22  3:21 AM   Result Value Ref Range    Natriuretic Peptide 171.4 (H) <=100.0 pg/mL   Comprehensive Metabolic Panel    Collection Time: 07/26/22  3:21 AM   Result Value Ref Range    Sodium Level 129 (L) 136 - 145 mmol/L    Potassium Level 4.1 3.5 - 5.1 mmol/L    Chloride 95 (L) 98 - 107 mmol/L    Carbon Dioxide 21 (L) 23 - 31 mmol/L    Glucose Level 144 (H) 82 - 115 mg/dL    Blood Urea Nitrogen 22.8 (H) 9.8 - 20.1 mg/dL    Creatinine 1.16 (H) 0.55 - 1.02 mg/dL    Calcium Level Total 8.8 8.4 - 10.2 mg/dL    Protein Total 6.1 5.8 - 7.6 gm/dL    Albumin Level 2.8 (L) 3.4 - 4.8 gm/dL    Globulin 3.3 2.4 - 3.5 gm/dL    Albumin/Globulin Ratio 0.8 (L) 1.1 - 2.0 ratio    Bilirubin Total 0.3 <=1.5 mg/dL    Alkaline Phosphatase 91 40 - 150 unit/L    Alanine Aminotransferase 17 0 - 55 unit/L    Aspartate Aminotransferase 40 (H) 5 - 34 unit/L    Estimated GFR-Non  48 mls/min/1.73/m2   Protime-INR    Collection Time: 07/26/22  3:21 AM   Result Value Ref Range    PT 64.7 (H) 12.5 - 14.5  seconds    INR 7.98 (HH) 0.00 - 1.30   Sedimentation rate    Collection Time: 07/26/22  3:21 AM   Result Value Ref Range    Sed Rate 58 (H) 0 - 20 mm/hr   CBC with Differential    Collection Time: 07/26/22  3:21 AM   Result Value Ref Range    WBC 16.3 (H) 4.5 - 11.5 x10(3)/mcL    RBC 2.68 (L) 4.20 - 5.40 x10(6)/mcL    Hgb 8.9 (L) 12.0 - 16.0 gm/dL    Hct 27.7 (L) 37.0 - 47.0 %    .4 (H) 80.0 - 94.0 fL    MCH 33.2 (H) 27.0 - 31.0 pg    MCHC 32.1 (L) 33.0 - 36.0 mg/dL    RDW 12.7 11.5 - 17.0 %    Platelet 225 130 - 400 x10(3)/mcL    MPV 9.8 7.4 - 10.4 fL    Neut % 95.1 %    Lymph % 2.0 %    Mono % 2.2 %    Eos % 0.0 %    Basophil % 0.1 %    Lymph # 0.33 (L) 0.6 - 4.6 x10(3)/mcL    Neut # 15.5 (H) 2.1 - 9.2 x10(3)/mcL    Mono # 0.36 0.1 - 1.3 x10(3)/mcL    Eos # 0.00 0 - 0.9 x10(3)/mcL    Baso # 0.01 0 - 0.2 x10(3)/mcL    IG# 0.10 (H) 0 - 0.04 x10(3)/mcL    IG% 0.6 %    NRBC% 0.0 %   Protime-INR    Collection Time: 07/26/22  9:02 AM   Result Value Ref Range    PT 65.1 (H) 12.5 - 14.5 seconds    INR 8.03 (HH) 0.00 - 1.30   APTT    Collection Time: 07/26/22  9:02 AM   Result Value Ref Range    PTT 75.6 (H) 23.2 - 33.7 seconds     [unfilled]  Wt Readings from Last 3 Encounters:   07/25/22 83.9 kg (185 lb)   07/18/22 87.1 kg (192 lb)   07/13/22 84.4 kg (186 lb)       Physical Exam:  General: Alert and oriented, mild acute distress noted due to respiratory status noted.  Neck: No carotid bruit, no jugular venous distention.  Respiratory: Breath sounds are equal, symmetrical chest wall expansion. Breath sounds are diminished, coarse. On Vapotherm.  Cardiovascular: Normal rate, regular rhythm. No murmur. No gallop. No edema noted. Patient is NSR on tele.  Integumentary: Clean, warm, dry, and intact.  Neurologic: Alert and oriented.   Psychiatric: Cooperative, appropriate mood and affect.        Assessment/Plan:    1.  Acute hypoxic respiratory failure in the setting of pulmonary fibrosis.    -CXR significantly  abnormal.    -Suspect primary pulmonary process without much contribution from CHF noting no JVP or LE edema and BNP only mildly elevated.    -Last echo showed normal LV systolic function with normal PA systolic pressure.    -OK for gentle diuresis but would not recommend aggressive diuresis at this point -> continue Lasix 40 mg PO daily.  -Pulmonary and ID following; on antibiotic therapy.      2.  PAF.    -Continue flecainide and metoprolol as tolerated.    3.  History of PAF, DVT, Factor V Leiden.    -Hold coumadin given supratherapeutic INR.    -INR 8.0 today  -Resume Coumadin when INR < 2.5.    4.  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                FILIPPO Nelson, FNP-C  [facility]

## 2022-07-26 NOTE — PROGRESS NOTES
[facility] - 9th Floor Med Surg  Pulmonary Critical Care Note    Patient Name: Ria Holman  MRN: [id number]  Admission Date: 2022  Hospital Length of Stay: 2 days  Code Status: DNR  Attending Provider: Reinaldo Mckay MD  Primary Care Provider: Reinaldo Mckay MD     Subjective:     HPI:   This is a 77-year-old female patient of Dr. 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 She also has a history of DVT in her right lower extremity and factor V Leiden mutation as well as atrial fibrillation, on coumadin. Most recently had been on Cellcept and prednisone by Dr Branch. She had been experiencing increasing dyspnea and cough and was seen in our clinic last week. O2 levels were low and she was initiated on oxygen, started on bactrim and azithromyin and increased her prednisone. A CT scan was obtained at outside hospital which revealed no PE but showed patchy areas of groundglass increasing compared to 2022 per report. She had low oxygen levels that persisted at home despite nasal cannula and she was admitted on 22. Workup revealed anemia, elevated INR, and hypoxemia. Her respiratory culture from last week is with normal respiratory katie and COVID and flu swabs are negative.     Interval Hx:  ID consulted and added bactrim for possible PJP. PJP PCR and fungitell still pending. Slightly more anemic today, sputum with some dark brown streaks. Currently on Vapotherm 90%FIO2 and 40L with sats in upper 80s.        Past Medical History:   Diagnosis Date    Atrial fibrillation     Cancer     skin cancer    Deep vein thrombosis     Diabetes mellitus     GERD (gastroesophageal reflux disease)     Hyperlipidemia     Hypertension     Hypothyroidism     Interstitial lung disease        Past Surgical History:   Procedure Laterality Date    APPENDECTOMY      BIOPSY OF ADENOIDS      BIOPSY OF TEMPORAL ARTERY       SECTION       FOOT SURGERY      FUNCTIONAL ENDOSCOPIC SINUS SURGERY (FESS)      JOINT REPLACEMENT      hip    RIGHT HEART CATHETERIZATION Right 2/25/2021    Procedure: INSERTION, CATHETER, RIGHT HEART;  Surgeon: Brigdette Cheung MD;  [facility] CATH LAB;  Service: Cardiology;  Laterality: Right;    THORACOSCOPY      TONSILLECTOMY         Social History     Socioeconomic History    Marital status:    Tobacco Use    Smoking status: Never Smoker    Smokeless tobacco: Never Used   Substance and Sexual Activity    Alcohol use: Never    Drug use: Never           Objective:     Current Outpatient Medications   Medication Instructions    cholecalciferol, vitamin D3, 125 mcg (5,000 unit) Tab No dose, route, or frequency recorded.    DOCUSATE CALCIUM ORAL No dose, route, or frequency recorded.    flecainide (TAMBOCOR) 100 mg, Oral, 2 times daily    gabapentin (NEURONTIN) 300 MG capsule TAKE ONE CAPSULE TWICE DAILY    gabapentin 300 mg, Oral, Nightly    hydrOXYzine pamoate (VISTARIL) 25 mg, Oral, Nightly    levothyroxine (SYNTHROID) 50 MCG tablet TAKE ONE TABLET ONCE DAILY    loratadine (CLARITIN) 10 mg, Oral    losartan (COZAAR) 50 mg, Oral, Daily    metoprolol succinate (TOPROL-XL) 25 mg, Oral, Daily    mycophenolate (CELLCEPT) 500 mg Tab Oral, 2 times daily    omeprazole (PRILOSEC) 20 mg, Oral, Daily    pravastatin (PRAVACHOL) 20 mg, Oral, Nightly    psyllium 0.52 g, Oral, 2 times daily    Saccharomyces boulardii (FLORASTOR) 250 mg, Oral, 2 times daily    spironolactone (ALDACTONE) 25 MG tablet TAKE ONE TABLET ONCE DAILY    sulfamethoxazole-trimethoprim 800-160mg (BACTRIM DS) 800-160 mg Tab 1 tablet, Oral, 2 times daily    zolpidem (AMBIEN) 5 MG Tab TAKE ONE TABLET AT BEDTIME       Current Inpatient Medications   aspirin  81 mg Oral Daily    cefTRIAXone (ROCEPHIN) IVPB  1 g Intravenous Q24H    docusate calcium  240 mg Oral Daily    famotidine  20 mg Oral BID    flecainide  100 mg Oral BID     gabapentin  300 mg Oral BID    hydrOXYzine pamoate  25 mg Oral QHS    Lactobacillus acidophilus  1 capsule Oral TID WM    levothyroxine  50 mcg Oral Daily    losartan  50 mg Oral Daily    methylPREDNISolone sodium succinate injection  80 mg Intravenous Q8H    metoprolol succinate  25 mg Oral Daily    mycophenolate  500 mg Oral BID    pantoprazole  40 mg Oral Daily    phytonadione (vitamin K1)  5 mg Oral Once    polyethylene glycol  17 g Oral Daily    pravastatin  20 mg Oral QHS    psyllium husk (with sugar)  1 packet Oral Daily    spironolactone  25 mg Oral Daily    trimethoprim-sulfamethoxasole (BACTRIM) IVPB (for non fluid restricted pts) (fixed ratio)  20 mg/kg/day Intravenous Q6H    vitamin D  5,000 Units Oral Daily    zolpidem  5 mg Oral Nightly           Intake/Output Summary (Last 24 hours) at 7/26/2022 0816  Last data filed at 7/26/2022 0500  Gross per 24 hour   Intake 600 ml   Output 1000 ml   Net -400 ml       Review of Systems   Respiratory: Positive for cough and shortness of breath.         Vital Signs (Most Recent):  Temp: 97.6 °F (36.4 °C) (07/26/22 0453)  Pulse: (!) 53 (07/26/22 0453)  Resp: (!) 22 (07/26/22 0453)  BP: 102/65 (07/26/22 0453)  SpO2: (!) 94 % (07/26/22 0453)  Body mass index is 32.77 kg/m².  Weight: 83.9 kg (185 lb) Vital Signs (24h Range):  Temp:  [97.5 °F (36.4 °C)-99.7 °F (37.6 °C)] 97.6 °F (36.4 °C)  Pulse:  [53-80] 53  Resp:  [17-28] 22  SpO2:  [89 %-96 %] 94 %  BP: (102-150)/(57-71) 102/65     Physical Exam  Vitals reviewed.   Constitutional:       Appearance: Normal appearance.   HENT:      Head: Normocephalic and atraumatic.   Cardiovascular:      Rate and Rhythm: Normal rate.      Heart sounds: Normal heart sounds.   Pulmonary:      Comments: Scattered crackles  Abdominal:      Palpations: Abdomen is soft.   Musculoskeletal:      Cervical back: Neck supple.   Neurological:      Mental Status: She is alert.           Lines/Drains/Airways     Drain  Duration                 Urethral Catheter 07/25/22 1000 Straight-tip 16 Fr. <1 day          Peripheral Intravenous Line  Duration                Peripheral IV - Single Lumen 07/25/22 1000 Anterior;Right Upper Arm <1 day                Significant Labs:    Lab Results   Component Value Date    WBC 16.3 (H) 07/26/2022    HGB 8.9 (L) 07/26/2022    HCT 27.7 (L) 07/26/2022    .4 (H) 07/26/2022     07/26/2022         BMP  Lab Results   Component Value Date     (L) 07/26/2022    K 4.1 07/26/2022    CL 98 02/25/2021    CO2 21 (L) 07/26/2022    BUN 22.8 (H) 07/26/2022    CREATININE 1.16 (H) 07/26/2022    CALCIUM 8.8 07/26/2022    ANIONGAP 5 (L) 02/25/2021    ESTGFRAFRICA >60.0 02/25/2021    EGFRNONAA 48 07/26/2022       ABG  Recent Labs   Lab 07/24/22 1933   PH 7.44   PO2 61*   PCO2 38   HCO3 25.8           Significant Imaging:  I have reviewed all pertinent imaging within the past 24 hours.        Assessment/Plan:     Assessment  1. Pulmonary fibrosis s/t  antisynthetase syndrome, on Cellcept and prednisone per rheumatology  2. Worsening bilateral infiltrates on imaging -- possible progression of ILD vs infectious vs inflammatory vs DAH  3. Acute hypoxemic respiratory failure s/t above  4. Anemia and elevated INR  5. Atrial fibrillation on coumadin  6. Pulmonary HTN, mild by RHC  7. History of DVT and factor V Leiden mutation, on coumadin  8. EWA  9. Hyponatremia      Plan  Continue broad spectrum antibiotics with Bactrim and Rocephin   Fungitell, Resp PCR, PJP PCR all pending  Holding anticoagulation cannot rule out alveolar hemorrhage with elevated INR and pulmonary infiltrates with anemia, too unstable for bronchoscopy at this time  Continue IV steroids  Will attempt weaning oxygen if possible  Patient remains DNR         JEFERSON Pride  Pulmonary Critical Care Medicine  [facility] - 9th Floor Med Surg

## 2022-07-26 NOTE — PROGRESS NOTES
SUBJECTIVE:  AF.  Now on 40L / 100% via Vapotherm.  Feels more dyspneic today.           REVIEW OF SYSTEMS: Negative unless stated above         MEDICATIONS:   Reviewed in EMR        PHYSICAL EXAM:   Vitals:    07/26/22 1129   BP: 126/65   Pulse: 69   Resp:    Temp: 98.3 °F (36.8 °C)      GENERAL: In bed on Vapotherm; NAD; does not appear toxic  SKIN: no rash  HEENT: sclera non-icteric; PERRL  NECK: supple; no LAD  CHEST: Coarse w/crackles; mildly tachypneic, labored somewhat today; equal expansion  CARDIOVASCULAR: RRR, S1S2; no murmur although heart sounds difficult to auscultate; strong, equal peripheral pulses; no edema  ABDOMEN:  active bowel sounds; abdomen soft, nondistended, nontender to palpation  EXTREMITIES: no cyanosis or clubbing  NEURO: AAO x4; CN II-XII grossly intact  PSYCH: Mentation and affect appropriate          LABORATORY DATA: Reviewed         RADIOLOGICAL DATA:   Imaging Results          X-Ray Chest AP Portable (Final result)  Result time 07/25/22 06:31:27    Final result by Fletcher Champion MD (07/25/22 06:31:27)                 Impression:      Interval development of dense bilateral opacification is concerning for typical infectious process.  Recommend continued follow-up.      Electronically signed by: Fletcher Champion  Date:    07/25/2022  Time:    06:31             Narrative:    EXAMINATION:  XR CHEST AP PORTABLE    CLINICAL HISTORY:  Shortness of breath    TECHNIQUE:  Single view of the chest    COMPARISON:  01/20/2022    FINDINGS:  Interval development of dense bilateral opacifications.    The cardiomediastinal silhouette is within normal limits.    No acute osseous abnormality.                                      IMPRESSION:   She is a 77-year-old female with a past medical history of ILD on chronic CellCept and steroids who recently developed dyspnea on exertion as well as hypoxia.  Imaging concerning for atypical process.  Id consult for input.    1. Worsening bilateral pulmonary  infiltrates, concerning for atypical infection such as PJP vs progression of ILD vs other  2. 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 Acute hypoxemic respiratory failure  4. Atrial fibrillation / history of DVT / factor V Leiden mutation on Coumadin  5. EWA      PLAN:  Respiratory PCR panel negative.  F/u Fungitell and PCP PCR.  Will also send Aspergillus Ag.  Noted mild EWA today - ?s/t Bactrim vs over diuresis.  Repeat BMP today at 3pm - if renal function worse, may need to change Bactrim to atovaquone.   Continue ceftriaxone.  Discussed with patient, family, and nursing.

## 2022-07-26 NOTE — NURSING
Patients O2 sat dropped to the 30s on her telemetry monitor. We ran in the room and pt was found to be apneic, skin color grey. Bipap was turned up to 100%. RRT was called. By the time ICU nurse got to floor patient had come back up to the 90s- O2 sat. She was slow to wake up and talk to me but eventually did and said she was very nauseated and needed to take the mask off. Zofran was given, respiratory was called to come switch pts masks. O2 sat was good at 96%. Pt switched back to vapo-therm and held her O2 Sat at 92%. Blood tinged urine noted-this is new but pts INR has been critical and was treated with vitamin K this am. Md aware. Notified Dr. Long of episode. Pt remains stable condition on vapo-therm.

## 2022-07-26 NOTE — PROGRESS NOTES
Subjective:    The patient slept well last night but then she had a coughing spell this morning.  Her O2 sats dropped afterwards and is slow to recover.  Otherwise no complaints.  She did however have some visual hallucinations last night seeing some small green bugs in her room.    Objective:    She is afebrile blood pressure 102/65 heart rate 53 respiratory 20 O2 sat 94 on Vapotherm    General she is in no acute distress she is alert and appropriate.    Heart has a regular rate and rhythm.  Lungs with diffuse crackles.  Abdomen nontender.  Extremities without edema.    Today's lab work shows a drop in her sodium to 129 BUN 22 creatinine 1.16 which is slightly higher  white count 00828 H&H 8.9 in 27 sed rate 58      Assessment:    1. Respiratory distress.  Mild improvement    2. Pulmonary fibrosis.  Progressive.  Infection being ruled out and treated empirically.    3. Agree with  Dom is a probably not much heart failure    4. Coumadin toxicity.  Repeat INR pending    5. History of atrial fib status post ablation    Plan:    Continue antibiotic therapy.  Will go back to her daily dose of oral Lasix.  Will follow-up on her INRs.  Coumadin currently on hold.

## 2022-07-27 PROBLEM — R06.03 SIGNS AND SYMPTOMS OF SEVERE RESPIRATORY DISTRESS: Status: ACTIVE | Noted: 2022-01-01

## 2022-07-27 NOTE — PROGRESS NOTES
[facility] - 9th Floor Med Surg  Pulmonary Critical Care Note    Patient Name: Ria Holman  MRN: [id number]  Admission Date: 2022  Hospital Length of Stay: 3 days  Code Status: DNR  Attending Provider: Reinaldo Mckay MD  Primary Care Provider: Reinaldo Mckay MD     Subjective:     HPI:   This is a 77-year-old female patient of Dr. 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 She also has a history of DVT in her right lower extremity and factor V Leiden mutation as well as atrial fibrillation, on coumadin. Most recently had been on Cellcept and prednisone by Dr Branch. She had been experiencing increasing dyspnea and cough and was seen in our clinic last week. O2 levels were low and she was initiated on oxygen, started on bactrim and azithromyin and increased her prednisone. A CT scan was obtained at outside hospital which revealed no PE but showed patchy areas of groundglass increasing compared to 2022 per report. She had low oxygen levels that persisted at home despite nasal cannula and she was admitted on 22. Workup revealed anemia, elevated INR, and hypoxemia. Her respiratory culture from last week is with normal respiratory katie and COVID and flu swabs are negative.     24 Hour Interval History:  Decreased work of breathing today but still requiring high-flow O2 with 40 liters/minute and 100% Vapotherm.  Did not require BiPAP overnight.  Maintained O2 saturations in the low 90s.  Not much cough this morning.    Past Medical History:   Diagnosis Date    Atrial fibrillation     Cancer     skin cancer    Deep vein thrombosis     Diabetes mellitus     GERD (gastroesophageal reflux disease)     Hyperlipidemia     Hypertension     Hypothyroidism     Interstitial lung disease        Past Surgical History:   Procedure Laterality Date    APPENDECTOMY      BIOPSY OF ADENOIDS      BIOPSY OF TEMPORAL ARTERY        SECTION      FOOT SURGERY      FUNCTIONAL ENDOSCOPIC SINUS SURGERY (FESS)      JOINT REPLACEMENT      hip    RIGHT HEART CATHETERIZATION Right 2/25/2021    Procedure: INSERTION, CATHETER, RIGHT HEART;  Surgeon: Bridgette Cheung MD;  Location: [facility] CATH LAB;  Service: Cardiology;  Laterality: Right;    THORACOSCOPY      TONSILLECTOMY         Social History     Socioeconomic History    Marital status:    Tobacco Use    Smoking status: Never Smoker    Smokeless tobacco: Never Used   Substance and Sexual Activity    Alcohol use: Never    Drug use: Never           Objective:     Current Outpatient Medications   Medication Instructions    cholecalciferol, vitamin D3, 125 mcg (5,000 unit) Tab No dose, route, or frequency recorded.    DOCUSATE CALCIUM ORAL No dose, route, or frequency recorded.    flecainide (TAMBOCOR) 100 mg, Oral, 2 times daily    gabapentin (NEURONTIN) 300 MG capsule TAKE ONE CAPSULE TWICE DAILY    gabapentin 300 mg, Oral, Nightly    hydrOXYzine pamoate (VISTARIL) 25 mg, Oral, Nightly    levothyroxine (SYNTHROID) 50 MCG tablet TAKE ONE TABLET ONCE DAILY    loratadine (CLARITIN) 10 mg, Oral    losartan (COZAAR) 50 mg, Oral, Daily    metoprolol succinate (TOPROL-XL) 25 mg, Oral, Daily    mycophenolate (CELLCEPT) 500 mg Tab Oral, 2 times daily    omeprazole (PRILOSEC) 20 mg, Oral, Daily    pravastatin (PRAVACHOL) 20 mg, Oral, Nightly    psyllium 0.52 g, Oral, 2 times daily    Saccharomyces boulardii (FLORASTOR) 250 mg, Oral, 2 times daily    spironolactone (ALDACTONE) 25 MG tablet TAKE ONE TABLET ONCE DAILY    sulfamethoxazole-trimethoprim 800-160mg (BACTRIM DS) 800-160 mg Tab 1 tablet, Oral, 2 times daily    zolpidem (AMBIEN) 5 MG Tab TAKE ONE TABLET AT BEDTIME       Current Inpatient Medications   aspirin  81 mg Oral Daily    cefTRIAXone (ROCEPHIN) IVPB  1 g Intravenous Q24H    clindamycin (CLEOCIN) IVPB  600 mg Intravenous Q8H    docusate calcium  240 mg Oral Daily     famotidine  20 mg Oral BID    flecainide  100 mg Oral BID    furosemide  40 mg Oral Daily    gabapentin  300 mg Oral BID    hydrOXYzine pamoate  25 mg Oral QHS    Lactobacillus acidophilus  1 capsule Oral TID WM    levothyroxine  50 mcg Oral Before breakfast    losartan  50 mg Oral Daily    methylPREDNISolone sodium succinate injection  80 mg Intravenous Q8H    metoprolol succinate  25 mg Oral Daily    mycophenolate  1,000 mg Oral BID    pantoprazole  40 mg Oral Daily    polyethylene glycol  17 g Oral Daily    pravastatin  20 mg Oral QHS    primaquine  26.3 mg Oral Q24H    psyllium husk (with sugar)  1 packet Oral Daily    spironolactone  25 mg Oral Daily    vitamin D  5,000 Units Oral Daily    zolpidem  5 mg Oral Nightly           Intake/Output Summary (Last 24 hours) at 7/27/2022 0717  Last data filed at 7/27/2022 0450  Gross per 24 hour   Intake 960 ml   Output 1300 ml   Net -340 ml       Vital Signs (Most Recent):  Temp: 98.4 °F (36.9 °C) (07/27/22 0211)  Pulse: 65 (07/27/22 0211)  Resp: 17 (07/27/22 0211)  BP: 127/63 (07/27/22 0211)  SpO2: 95 % (07/27/22 0300)  Body mass index is 32.77 kg/m².  Weight: 83.9 kg (185 lb) Vital Signs (24h Range):  Temp:  [97.5 °F (36.4 °C)-98.6 °F (37 °C)] 98.4 °F (36.9 °C)  Pulse:  [58-71] 65  Resp:  [17-20] 17  SpO2:  [89 %-96 %] 95 %  BP: (105-133)/(63-73) 127/63     Physical Exam  Constitutional:       Appearance: Normal appearance.   HENT:      Head: Normocephalic and atraumatic.   Cardiovascular:      Rate and Rhythm: Normal rate.      Heart sounds: Normal heart sounds.   Pulmonary:      Comments: Scattered crackles  Abdominal:      Palpations: Abdomen is soft.   Musculoskeletal:      Cervical back: Neck supple.   Neurological:      Mental Status: She is alert.     Mechanical ventilation support:  Oxygen Concentration (%): 100 (07/27/22 0300)    Lines/Drains/Airways     Drain  Duration                Urethral Catheter 07/25/22 1000 Straight-tip 16 Fr. 1  day          Peripheral Intravenous Line  Duration                Peripheral IV - Single Lumen 07/25/22 1000 Anterior;Right Upper Arm 1 day                Significant Labs:    Lab Results   Component Value Date    WBC 18.7 (H) 07/27/2022    HGB 8.8 (L) 07/27/2022    HCT 26.7 (L) 07/27/2022    .5 (H) 07/27/2022     07/27/2022         BMP  Lab Results   Component Value Date     (L) 07/27/2022    K 4.6 07/27/2022    CL 98 02/25/2021    CO2 22 (L) 07/27/2022    BUN 32.6 (H) 07/27/2022    CREATININE 1.07 (H) 07/27/2022    CALCIUM 8.6 07/27/2022    ANIONGAP 5 (L) 02/25/2021    ESTGFRAFRICA >60.0 02/25/2021    EGFRNONAA 53 07/27/2022       ABG  Recent Labs   Lab 07/24/22  1933   PH 7.44   PO2 61*   PCO2 38   HCO3 25.8           Significant Imaging:  I have reviewed all pertinent imaging within the past 24 hours.        Assessment/Plan:     Assessment  1. Pulmonary fibrosis s/t  antisynthetase syndrome, on Cellcept and prednisone per rheumatology  2. Worsening bilateral infiltrates on imaging -- possible progression of ILD vs infectious vs inflammatory vs DAH  3. Acute hypoxemic respiratory failure s/t above  4. Anemia and elevated INR  5. Atrial fibrillation on coumadin  6. Pulmonary HTN, mild by RHC  7. History of DVT and factor V Leiden mutation, on coumadin  8. EWA  9. Hyponatremia      Plan  Continue present antibiotic therapy.  Continue prednisone.  Wean FiO2 as tolerated.  Await results of studies per Dr. Sherman.  Will continue to follow with you.       KRAIG Sheriff MD  Pulmonary Critical Care Medicine  [facility] - 9th Floor Med Surg

## 2022-07-27 NOTE — PROGRESS NOTES
Cardiology Daily Progress Note    Patient Name: Ria Holman  Age: 77 y.o.  : 1945  MRN: [id number]  Admission Date: 2022      Subjective: No acute cardiac events overnight. Patient with respiratory events throughout the afternoon yesterday requiring bipap for a short period. She is back on vpaotherm at 100% fio2. She continues to note SOB even at rest. She states she is hypoxic to the 80s when she moves in bed.       Review of Systems - General ROS: negative.  Respiratory ROS: no cough, + shortness of breath, + wheezing.  Cardiovascular ROS: no chest pain, + dyspnea on exertion.  Gastrointestinal ROS: no abdominal pain, change in bowel habits, or black or bloody stools.  Genito-Urinary ROS: no dysuria, trouble voiding, or hematuria.  Musculoskeletal ROS: negative.  Neurological ROS: negative.      Health Status:  Review of patient's allergies indicates:   Allergen Reactions    Codeine Itching    Doxycycline Itching and Rash    Levofloxacin Nausea Only     Other reaction(s): Acid reflux       Current Facility-Administered Medications   Medication Dose Route Frequency Provider Last Rate Last Admin    acetaminophen tablet 650 mg  650 mg Oral Q8H PRN Reinaldo Mckay MD        acetaminophen tablet 650 mg  650 mg Oral Q8H PRN Reinaldo Mckay MD        ALPRAZolam tablet 0.25 mg  0.25 mg Oral TID PRN JEFERSON Pride   0.25 mg at 22 1412    aspirin chewable tablet 81 mg  81 mg Oral Daily Reinaldo Mckay MD   81 mg at 22 0908    cefTRIAXone (ROCEPHIN) 1 g in dextrose 5 % in water (D5W) 5 % 50 mL IVPB (MB+)  1 g Intravenous Q24H Reinaldo Mckay MD   Stopped at 22 2236    clindamycin in D5W 600 mg/50 mL IVPB 600 mg  600 mg Intravenous Q8H JEFERSON Ramirez   Stopped at 22 0520    docusate calcium capsule 240 mg  240 mg Oral Daily Reinaldo Mckay MD   240 mg at 22 0908    famotidine tablet 20 mg  20 mg Oral BID Reinaldo Mckay MD    20 mg at 07/26/22 2025    flecainide tablet 100 mg  100 mg Oral BID Reinaldo Mckay MD   100 mg at 07/26/22 0909    furosemide tablet 40 mg  40 mg Oral Daily Reinaldo Mckay MD   40 mg at 07/26/22 0911    gabapentin capsule 300 mg  300 mg Oral BID Reinaldo Mckay MD   300 mg at 07/26/22 2025    HYDROcodone-acetaminophen 5-325 mg per tablet 1 tablet  1 tablet Oral Q4H PRN Reinaldo Mckay MD        hydrOXYzine pamoate capsule 25 mg  25 mg Oral QHS Reinaldo Mckay MD   25 mg at 07/26/22 2025    Lactobacillus acidophilus capsule 1 capsule  1 capsule Oral TID  Reinaldo Mckay MD   1 capsule at 07/26/22 1111    levothyroxine tablet 50 mcg  50 mcg Oral Before breakfast Reinaldo Mckay MD   50 mcg at 07/27/22 0506    losartan tablet 50 mg  50 mg Oral Daily Reinaldo Mckay MD   50 mg at 07/26/22 0907    melatonin tablet 6 mg  6 mg Oral Nightly PRN Reinaldo Mckay MD        methylPREDNISolone sodium succinate injection 80 mg  80 mg Intravenous Q8H JEFERSON Pride   80 mg at 07/27/22 0229    metoprolol succinate (TOPROL-XL) 24 hr tablet 25 mg  25 mg Oral Daily Reinaldo Mckay MD   25 mg at 07/26/22 0908    mycophenolate capsule 1,000 mg  1,000 mg Oral BID Reinaldo Mckay MD   1,000 mg at 07/26/22 2025    ondansetron injection 4 mg  4 mg Intravenous Q6H PRN Reinaldo Mckay MD   4 mg at 07/26/22 2001    pantoprazole EC tablet 40 mg  40 mg Oral Daily Reinaldo Mckay MD   40 mg at 07/26/22 0908    polyethylene glycol packet 17 g  17 g Oral Daily Reinaldo Mckay MD   17 g at 07/25/22 1034    pravastatin tablet 20 mg  20 mg Oral QHS Reinaldo Mckay MD   20 mg at 07/26/22 2025    primaquine tablet 26.3 mg  26.3 mg Oral Q24H JEFERSON Ramirez   26.3 mg at 07/26/22 2025    psyllium husk (with sugar) 3.4 gram packet 1 packet  1 packet Oral Daily Reinaldo Mckay MD   1 packet at 07/26/22 0910    sodium chloride 0.9% flush 10  mL  10 mL Intravenous PRN Reinaldo Mckay MD        spironolactone tablet 25 mg  25 mg Oral Daily Reinaldo Mckay MD   25 mg at 07/26/22 0907    vitamin D 1000 units tablet 5,000 Units  5,000 Units Oral Daily Reinaldo Mckay MD   5,000 Units at 07/26/22 1110    warfarin (COUMADIN) tablet 5 mg  5 mg Oral Daily Reinaldo Mckay MD        zolpidem tablet 5 mg  5 mg Oral Nightly Reinaldo Mckay MD   5 mg at 07/26/22 2025       Objective:  Patient Vitals for the past 24 hrs:   BP Temp Temp src Pulse Resp SpO2   07/27/22 0724 129/68 97.8 °F (36.6 °C) Oral 68 -- (!) 90 %   07/27/22 0300 -- -- -- -- -- 95 %   07/27/22 0211 127/63 98.4 °F (36.9 °C) Oral 65 17 (!) 91 %   07/26/22 2231 -- 97.5 °F (36.4 °C) Oral (!) 58 -- 96 %   07/26/22 2054 -- -- -- -- -- (!) 93 %   07/26/22 1953 105/68 97.8 °F (36.6 °C) Oral 68 18 (!) 89 %   07/26/22 1440 131/73 98.6 °F (37 °C) Axillary 65 -- (!) 94 %   07/26/22 1129 126/65 98.3 °F (36.8 °C) Axillary 69 -- --   07/26/22 0911 133/68 -- -- -- -- --   07/26/22 0908 133/68 -- -- 71 -- --   07/26/22 0907 133/68 -- -- -- -- --   07/26/22 0854 133/68 98 °F (36.7 °C) Axillary 71 20 --     Recent Results (from the past 24 hour(s))   Protime-INR    Collection Time: 07/26/22  9:02 AM   Result Value Ref Range    PT 65.1 (H) 12.5 - 14.5 seconds    INR 8.03 (HH) 0.00 - 1.30   APTT    Collection Time: 07/26/22  9:02 AM   Result Value Ref Range    PTT 75.6 (H) 23.2 - 33.7 seconds   Basic Metabolic Panel    Collection Time: 07/26/22  2:19 PM   Result Value Ref Range    Sodium Level 126 (L) 136 - 145 mmol/L    Potassium Level 4.3 3.5 - 5.1 mmol/L    Chloride 93 (L) 98 - 107 mmol/L    Carbon Dioxide 21 (L) 23 - 31 mmol/L    Glucose Level 118 (H) 82 - 115 mg/dL    Blood Urea Nitrogen 29.7 (H) 9.8 - 20.1 mg/dL    Creatinine 1.34 (H) 0.55 - 1.02 mg/dL    BUN/Creatinine Ratio 22     Calcium Level Total 8.6 8.4 - 10.2 mg/dL    Estimated GFR-Non  41 mls/min/1.73/m2     Anion Gap 12.0 mEq/L   Lactate Dehydrogenase    Collection Time: 07/26/22  2:19 PM   Result Value Ref Range    Lactate Dehydrogenase 1,112 (H) 125 - 220 U/L   Basic Metabolic Panel    Collection Time: 07/27/22  4:05 AM   Result Value Ref Range    Sodium Level 127 (L) 136 - 145 mmol/L    Potassium Level 4.6 3.5 - 5.1 mmol/L    Chloride 95 (L) 98 - 107 mmol/L    Carbon Dioxide 22 (L) 23 - 31 mmol/L    Glucose Level 115 82 - 115 mg/dL    Blood Urea Nitrogen 32.6 (H) 9.8 - 20.1 mg/dL    Creatinine 1.07 (H) 0.55 - 1.02 mg/dL    BUN/Creatinine Ratio 30     Calcium Level Total 8.6 8.4 - 10.2 mg/dL    Estimated GFR-Non  53 mls/min/1.73/m2    Anion Gap 10.0 mEq/L   Protime-INR    Collection Time: 07/27/22  4:05 AM   Result Value Ref Range    PT 29.5 (H) 12.5 - 14.5 seconds    INR 2.87 (H) 0.00 - 1.30   CBC with Differential    Collection Time: 07/27/22  4:05 AM   Result Value Ref Range    WBC 18.7 (H) 4.5 - 11.5 x10(3)/mcL    RBC 2.63 (L) 4.20 - 5.40 x10(6)/mcL    Hgb 8.8 (L) 12.0 - 16.0 gm/dL    Hct 26.7 (L) 37.0 - 47.0 %    .5 (H) 80.0 - 94.0 fL    MCH 33.5 (H) 27.0 - 31.0 pg    MCHC 33.0 33.0 - 36.0 mg/dL    RDW 12.5 11.5 - 17.0 %    Platelet 203 130 - 400 x10(3)/mcL    MPV 10.0 7.4 - 10.4 fL    Neut % 92.3 %    Lymph % 3.1 %    Mono % 3.5 %    Eos % 0.0 %    Basophil % 0.1 %    Lymph # 0.57 (L) 0.6 - 4.6 x10(3)/mcL    Neut # 17.2 (H) 2.1 - 9.2 x10(3)/mcL    Mono # 0.66 0.1 - 1.3 x10(3)/mcL    Eos # 0.00 0 - 0.9 x10(3)/mcL    Baso # 0.02 0 - 0.2 x10(3)/mcL    IG# 0.19 (H) 0 - 0.04 x10(3)/mcL    IG% 1.0 %    NRBC% 0.0 %     [unfilled]  Wt Readings from Last 3 Encounters:   07/25/22 83.9 kg (185 lb)   07/18/22 87.1 kg (192 lb)   07/13/22 84.4 kg (186 lb)       Physical Exam:  General: Alert and oriented, no acute distress.  Neck: No carotid bruit, no jugular venous distention.  Respiratory: Breath sounds are equal, symmetrical chest wall expansion. Breath sounds are diminished.  Cardiovascular:  Normal rate, regular rhythm. No murmur. No gallop. No edema noted. Patient is NSR on tele.  Integumentary: Clean, warm, dry, and intact.  Neurologic: Alert and oriented.   Psychiatric: Cooperative, appropriate mood and affect.        Assessment/Plan:    1.  Acute hypoxic respiratory failure in the setting of pulmonary fibrosis.    -CXR significantly abnormal.    -Suspect primary pulmonary process without much contribution from CHF noting no JVP or LE edema and BNP only mildly elevated.    -Last echo showed normal LV systolic function with normal PA systolic pressure.    -Continue Lasix 40 mg PO daily.  -Pulmonary and ID following; on antibiotic therapy.       2.  PAF.    -Continue flecainide and metoprolol as tolerated.     3.  History of PAF, DVT, Factor V Leiden.    -Hold coumadin given supratherapeutic INR.    -Coumadin resumed per primary care     4.  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        *Patient of Dr. Sheehan. Will need follow up with us in the clinic (unless palliative care/hospice is plan of care and the patient chooses not to follow up) when she is discharged.        FILIPPO Nelson, FNP-C  [facility]

## 2022-07-27 NOTE — PROGRESS NOTES
Subjective:  The patient feels about the same.  She did get some rest and use of BiPAP for about 4 hours early last night.  Then back on 100% Vapotherm.  She feels her cough is starting to loosen up a bit coughing up a small amount of bloody phlegm.    Objective:  She is afebrile blood pressure 129/68 heart rate 68 telemetry reveals sinus rhythm respiratory rate 24 and O2 sat 90% on 100% Vapotherm device.  Physical exam reveals regular rate and rhythm of the heart.  Lungs are clear anteriorly.  Abdomen nontender.  Extremities without clubbing cyanosis or edema.    Laboratory studies show a correction of the INR now down to 2.87.  BUN and creatinine have improved at 32 and 1.07.  Electrolytes okay.  CBC relatively stable with an H&H of 8.826.7 and [place] of 07079.    Assessment:  1. Respiratory distress.  More less stable but still very tenuous    2. Coumadin toxicity.  Corrected    3. History of atrial fib status post ablation    4. History of pulmonary fibrosis    5. History of anti synthetase syndrome     6. 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  I see that antibiotics have been adjusted per Infectious Disease.  I will go ahead resume Coumadin and follow pro times daily.

## 2022-07-28 NOTE — PROGRESS NOTES
Subjective: Patient had a rough night required BiPAP all night long.  She desaturates very quickly months she takes the mask off.    He is complaining of constipation early she has not moved her bowels in several days.  She does not feel like she needs to go and is really not interested in taking laxatives at the moment.  Will hold off and reassess tomorrow.    Objective:  Vital signs are stable.  O2 sats acceptable which she is on BiPAP with high oxygen content.  She is alert and in no acute distress.  Heart has a regular rate and rhythm.  Lungs with diffuse crackles.  Abdomen nontender.  Extremities without edema.    Laboratory studies show a stable H&H 9.4 and 28. White count remains elevated at 18,000 thousand INR stable at 2.54.  BMP shows a gradually worsening azotemia with a BUN 40 and creatinine of 1.16.    Assessment:    1. Respiratory failure.  She does seem to be slowly worsening.  Prognosis is worsening    2. Coumadin toxicity.  Corrected    3. Anti synthetase syndrome    4. Anticoagulated for history of DVT and factor 5 laden mutation    5. History of atrial fib status post ablation    Plan:    Continue current meds.  No code status continued.  Prognosis poor.  Pulmonary Medicine is recommend palliative  referral and we will proceed with that

## 2022-07-28 NOTE — CONSULTS
"Inpatient consult to Palliative Care  Consult performed by: JEFERSON Murguia  Consult ordered by: Reinaldo Mckay MD         Patient Name: Ria Holman   MRN: [id number]   Admission Date: 7/24/2022   Hospital Length of Stay: 4   Attending Provider: Reinaldo Mckay MD   Consulting Provider: Richa GOVEA  Reason for Consult: Goals of Care  Primary Care Physician: Reinaldo Mckay MD     Principal Problem: Signs and symptoms of severe respiratory distress     Patient information was obtained from past medical records and ER records.      Final diagnoses:  [R06.02] Shortness of breath  [J84.9] ILD (interstitial lung disease)  [J96.01] Acute hypoxemic respiratory failure (Primary)  [R79.1] Supratherapeutic INR     Assessment/Plan:     Met with patient and DIL in pt room .I introduced self and palliative care program. Pt lying in bed with BiPap in place. 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 States she is  and that she lives at home with her  who is "a spry 80 y/o". States that two of her doctors have visited her today. Reports she is "waiting to find out what her options are." Patient states she is a DNR. All patient questions were answered and plan formulated including recommendations for symptom management and how to best achieve goals of care.    Denies pain, N/V, D. Reports constipation with last BM 7/16. Placed order in EMR for Dulcolax suppository.     Recommendations:     Dulcolax suppository once today for constipation. Last BM 7/16.      History of Present Illness:   76 y/o female with H/O pulmonary fibrosis, DVT RLE and factor V Leiden mutation as well as atrial fibrillation.  She has been experiencing increasing dyspnea and cough and was seen in pulmonology clinic last week.  O2 levels were low and oxygen was initiated.  She was started on Bactrim and azithromycin and prednisone dose was increased. Despite " "oxygen and initiation at home via nasal cannula her oxygen level remained low. With worsening shortness of breath on exertion she was seen by the pulmonary nurse practitioner and increasing amounts of home oxygen were added.  Also workup revealed  progression of her infiltrates and she was started on Bactrim and higher doses of prednisone.  Despite the interventions her symptoms progressed and she became extremely short of breath at rest to the point that she could not walk to the car and was brought to the hospital by ambulance.  She was given IV antibiotics, IV steroids, and her symptoms improved significantly.  Workup after admission has revealed anemia, elevated INR, and hypoxemia.  Her respiratory culture from last week is negative for respiratory katie. COVID and flu swabs were negative.    Since admission she has required BiPAP which was then transitioned to Vapotherm.  She had significant coughing resulting in desaturations, was placed back on BiPAP.  She had intermittent blood streaking to her sputum Wednesday. Patient reports that while she was on Vapotherm she "didn't feel bad but the staff was concerned because her O2 sat dropped." Patient in bed with DIL present and denies distress at this time.  Palliative care consulted to discuss goals of care.      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    Atrial fibrillation 07/13/2022    Deep vein thrombosis (DVT) 07/13/2022    Factor V Leiden mutation 07/13/2022    Gastroesophageal reflux disease 07/13/2022    Hyperlipidemia 07/13/2022    Malignant neoplasm of skin 07/13/2022    Temporal arteritis 07/13/2022    Hypothyroidism 07/13/2022 "    Tremor 2022    Vitamin D deficiency 2022     Resolved Ambulatory Problems     Diagnosis Date Noted    No Resolved Ambulatory Problems     Past Medical History:   Diagnosis Date    Cancer     Deep vein thrombosis     Diabetes mellitus     GERD (gastroesophageal reflux disease)     Hypertension         Past Surgical History:   Procedure Laterality Date    APPENDECTOMY      BIOPSY OF ADENOIDS      BIOPSY OF TEMPORAL ARTERY       SECTION      FOOT SURGERY      FUNCTIONAL ENDOSCOPIC SINUS SURGERY (FESS)      JOINT REPLACEMENT      hip    RIGHT HEART CATHETERIZATION Right 2021    Procedure: INSERTION, CATHETER, RIGHT HEART;  Surgeon: Bridgette Cheung MD;  Location: [facility] CATH LAB;  Service: Cardiology;  Laterality: Right;    THORACOSCOPY      TONSILLECTOMY          Review of patient's allergies indicates:   Allergen Reactions    Codeine Itching    Doxycycline Itching and Rash    Levofloxacin Nausea Only     Other reaction(s): Acid reflux          Current Facility-Administered Medications:     acetaminophen tablet 650 mg, 650 mg, Oral, Q8H PRN, Reinaldo Mckay MD    acetaminophen tablet 650 mg, 650 mg, Oral, Q8H PRN, Reinaldo Mckay MD    ALPRAZolam tablet 0.25 mg, 0.25 mg, Oral, TID PRN, JEFERSON Pride, 0.25 mg at 2236    aspirin chewable tablet 81 mg, 81 mg, Oral, Daily, Reinaldo Mckay MD, 81 mg at 22    clindamycin in D5W 600 mg/50 mL IVPB 600 mg, 600 mg, Intravenous, Q8H, JEFERSON Ramirez, Stopped at 22 1217    docusate calcium capsule 240 mg, 240 mg, Oral, Daily, Reinaldo Mckay MD, 240 mg at 22    famotidine tablet 20 mg, 20 mg, Oral, BID, Reinaldo Mckay MD, 20 mg at 22    flecainide tablet 100 mg, 100 mg, Oral, BID, Reinaldo Mckay MD, 100 mg at 22    furosemide tablet 40 mg, 40 mg, Oral, Daily, Reinaldo Mckay MD, 40 mg at 22    gabapentin  capsule 300 mg, 300 mg, Oral, BID, Reinaldo Mckay MD, 300 mg at 07/27/22 2052    HYDROcodone-acetaminophen 5-325 mg per tablet 1 tablet, 1 tablet, Oral, Q4H PRN, Reinaldo Mckay MD, 1 tablet at 07/28/22 0737    hydrOXYzine pamoate capsule 25 mg, 25 mg, Oral, QHS, Reinaldo Mckay MD, 25 mg at 07/27/22 2052    Lactobacillus acidophilus capsule 1 capsule, 1 capsule, Oral, TID WM, Reinaldo Mckay MD, 1 capsule at 07/28/22 0737    levothyroxine tablet 50 mcg, 50 mcg, Oral, Before breakfast, Reinaldo Mckay MD, 50 mcg at 07/28/22 0737    losartan tablet 50 mg, 50 mg, Oral, Daily, Reinaldo Mckay MD, 50 mg at 07/27/22 0926    melatonin tablet 6 mg, 6 mg, Oral, Nightly PRN, Reinaldo Mckay MD    methylPREDNISolone sodium succinate injection 80 mg, 80 mg, Intravenous, Q8H, ELVIN PrideP, 80 mg at 07/28/22 1031    metoprolol succinate (TOPROL-XL) 24 hr tablet 25 mg, 25 mg, Oral, Daily, Reinaldo Mckay MD, 25 mg at 07/27/22 0925    mycophenolate capsule 1,000 mg, 1,000 mg, Oral, BID, Reinaldo Mckay MD, 1,000 mg at 07/27/22 2053    ondansetron injection 4 mg, 4 mg, Intravenous, Q6H PRN, Reinaldo Mckay MD, 4 mg at 07/28/22 0736    pantoprazole EC tablet 40 mg, 40 mg, Oral, Daily, Reinaldo Mckay MD, 40 mg at 07/27/22 0926    polyethylene glycol packet 17 g, 17 g, Oral, Daily, Reinaldo Mckay MD, 17 g at 07/27/22 0925    pravastatin tablet 20 mg, 20 mg, Oral, QHS, Reinaldo Mckay MD, 20 mg at 07/27/22 2052    primaquine tablet 26.3 mg, 26.3 mg, Oral, Q24H, ELVIN RamirezP, 26.3 mg at 07/27/22 1721    psyllium husk (with sugar) 3.4 gram packet 1 packet, 1 packet, Oral, Daily, Reinaldo Mckay MD, 1 packet at 07/27/22 0927    sodium chloride 0.9% flush 10 mL, 10 mL, Intravenous, PRN, Reinaldo Mckay MD    spironolactone tablet 25 mg, 25 mg, Oral, Daily, Reinaldo Mckay MD, 25 mg at 07/27/22 0926    vitamin D 1000  "units tablet 5,000 Units, 5,000 Units, Oral, Daily, Reinaldo Mckay MD, 5,000 Units at 07/27/22 0926    warfarin (COUMADIN) tablet 5 mg, 5 mg, Oral, Daily, Reinaldo Mckay MD, 5 mg at 07/27/22 1721    zolpidem tablet 5 mg, 5 mg, Oral, Nightly, Reinaldo Mckay MD, 5 mg at 07/27/22 2052     acetaminophen, acetaminophen, ALPRAZolam, HYDROcodone-acetaminophen, melatonin, ondansetron, sodium chloride 0.9%     Family History   Problem Relation Age of Onset    Diabetes Mellitus Mother     Heart failure Mother     Hypertension Mother     Heart attack Father     Hypertension Father     Hypertension Sister         Review of Systems   Constitutional: Positive for fatigue.   Respiratory: Positive for shortness of breath.    Cardiovascular:        H/O Afib.   Gastrointestinal: Positive for constipation.   Musculoskeletal: Negative.    Skin: Negative.    Allergic/Immunologic: Negative.    Hematological: Negative.    Psychiatric/Behavioral: Negative.             Objective:   BP (!) 122/55   Pulse 62   Temp 98 °F (36.7 °C) (Axillary)   Resp (!) 24   Ht 5' 3" (1.6 m)   Wt 83.9 kg (185 lb)   SpO2 97%   BMI 32.77 kg/m²      Physical Exam  Vitals and nursing note reviewed.   Constitutional:       Appearance: She is ill-appearing.   HENT:      Head: Normocephalic.      Mouth/Throat:      Mouth: Mucous membranes are dry.   Eyes:      Pupils: Pupils are equal, round, and reactive to light.   Cardiovascular:      Rate and Rhythm: Normal rate. Rhythm irregular.      Comments: H/O Afib.  Pulmonary:      Comments: On BiPap  Abdominal:      General: Bowel sounds are normal.      Palpations: Abdomen is soft.   Musculoskeletal:         General: Normal range of motion.   Skin:     General: Skin is warm and dry.      Coloration: Skin is pale.   Neurological:      Mental Status: She is alert and oriented to person, place, and time.   Psychiatric:         Mood and Affect: Mood normal.         Behavior: Behavior normal. "           FAMILY CONTACTS:     Review of Symptoms  Review of Symptoms    Symptom Assessment (ESAS 0-10 Scale)  Pain:  0  Dyspnea:  0  Anxiety:  0  Nausea:  0  Depression:  0  Anorexia:  0  Fatigue:  0  Insomnia:  0  Restlessness:  0  Agitation:  0         Performance Status:  50    Living Arrangements:  Lives with spouse and Lives in home    Psychosocial/Cultural: 76 y/o  female who lives at home with her . Retired nurse.     Spiritual:  F - Keisha and Belief:  Non Yazdanism  I - Importance:  Yes  C - Community:  Yes  A - Address in Care:  Yes     Time-Based Charting:  Yes    Total Time Spent: 0 minutes      Advance Care Planning   Advance Directives:   Do Not Resuscitate Status: Yes      Decision Making:  Patient answered questions and Family answered questions          PAINAD:     Caregiver burden formerly assessed: yes        > 50% of 60 min of encounter was spent in chart review, face to face discussion of goals of care, symptom assessment, coordination of care and emotional support.         Richa Martin FNP, ACHPN  Palliative Medicine  [facility] - Observation Unit

## 2022-07-28 NOTE — CARE UPDATE
Attempted; with help of RN to transition to Vapotherm for comfort.  Patient tolerated the night on BIPAP with sats maintained mid to upper 90's on 12/8 and 100%.  At this time patient c/o mouth dryness.  Sat dropped almost immediately with coughing and c/o nausea.  BIPAP mask held to help patient recover sats back to 90's.  Sips of water given and lips moistened. Replaced BIPAP on previous settings.

## 2022-07-28 NOTE — PROGRESS NOTES
[facility] - 9th Floor Med Surg  Pulmonary Critical Care Note    Patient Name: Ria Holman  MRN: [id number]  Admission Date: 7/24/2022  Hospital Length of Stay: 4 days  Code Status: DNR  Attending Provider: Reinaldo Mckay MD  Primary Care Provider: Reinaldo Mckay MD     Subjective:     HPI:   This is a 77-year-old female patient of Dr. 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 She also has a history of DVT in her right lower extremity and factor V Leiden mutation as well as atrial fibrillation, on coumadin. Most recently had been on Cellcept and prednisone by Dr Branch. She had been experiencing increasing dyspnea and cough and was seen in our clinic last week. O2 levels were low and she was initiated on oxygen, started on bactrim and azithromyin and increased her prednisone. A CT scan was obtained at outside hospital which revealed no PE but showed patchy areas of groundglass increasing compared to April 2022 per report. She had low oxygen levels that persisted at home despite nasal cannula and she was admitted on 7/24/22. Workup revealed anemia, elevated INR, and hypoxemia. Her respiratory culture from last week is with normal respiratory katie and COVID and flu swabs are negative.     24 Hour Interval History:  Required BiPAP overnight.  Upon transition to Vapotherm this morning she had significant coughing resulting in desaturation and was placed back on BiPAP.  She has had occasional sputum production yesterday with some intermittent blood streaking.    Past Medical History:   Diagnosis Date    Atrial fibrillation     Cancer     skin cancer    Deep vein thrombosis     Diabetes mellitus     GERD (gastroesophageal reflux disease)     Hyperlipidemia     Hypertension     Hypothyroidism     Interstitial lung disease        Past Surgical History:   Procedure Laterality Date    APPENDECTOMY      BIOPSY OF ADENOIDS      BIOPSY OF  TEMPORAL ARTERY       SECTION      FOOT SURGERY      FUNCTIONAL ENDOSCOPIC SINUS SURGERY (FESS)      JOINT REPLACEMENT      hip    RIGHT HEART CATHETERIZATION Right 2021    Procedure: INSERTION, CATHETER, RIGHT HEART;  Surgeon: Bridgette Cheung MD;  Location: [facility] CATH LAB;  Service: Cardiology;  Laterality: Right;    THORACOSCOPY      TONSILLECTOMY         Social History     Socioeconomic History    Marital status:    Tobacco Use    Smoking status: Never Smoker    Smokeless tobacco: Never Used   Substance and Sexual Activity    Alcohol use: Never    Drug use: Never           Objective:     Current Outpatient Medications   Medication Instructions    cholecalciferol, vitamin D3, 125 mcg (5,000 unit) Tab No dose, route, or frequency recorded.    DOCUSATE CALCIUM ORAL No dose, route, or frequency recorded.    flecainide (TAMBOCOR) 100 mg, Oral, 2 times daily    gabapentin (NEURONTIN) 300 MG capsule TAKE ONE CAPSULE TWICE DAILY    gabapentin 300 mg, Oral, Nightly    hydrOXYzine pamoate (VISTARIL) 25 mg, Oral, Nightly    levothyroxine (SYNTHROID) 50 MCG tablet TAKE ONE TABLET ONCE DAILY    loratadine (CLARITIN) 10 mg, Oral    losartan (COZAAR) 50 mg, Oral, Daily    metoprolol succinate (TOPROL-XL) 25 mg, Oral, Daily    mycophenolate (CELLCEPT) 500 mg Tab Oral, 2 times daily    omeprazole (PRILOSEC) 20 mg, Oral, Daily    pravastatin (PRAVACHOL) 20 mg, Oral, Nightly    psyllium 0.52 g, Oral, 2 times daily    Saccharomyces boulardii (FLORASTOR) 250 mg, Oral, 2 times daily    spironolactone (ALDACTONE) 25 MG tablet TAKE ONE TABLET ONCE DAILY    sulfamethoxazole-trimethoprim 800-160mg (BACTRIM DS) 800-160 mg Tab 1 tablet, Oral, 2 times daily    zolpidem (AMBIEN) 5 MG Tab TAKE ONE TABLET AT BEDTIME       Current Inpatient Medications   aspirin  81 mg Oral Daily    clindamycin (CLEOCIN) IVPB  600 mg Intravenous Q8H    docusate calcium  240 mg Oral Daily    famotidine  20 mg  Oral BID    flecainide  100 mg Oral BID    furosemide  40 mg Oral Daily    gabapentin  300 mg Oral BID    hydrOXYzine pamoate  25 mg Oral QHS    Lactobacillus acidophilus  1 capsule Oral TID WM    levothyroxine  50 mcg Oral Before breakfast    losartan  50 mg Oral Daily    methylPREDNISolone sodium succinate injection  80 mg Intravenous Q8H    metoprolol succinate  25 mg Oral Daily    mycophenolate  1,000 mg Oral BID    pantoprazole  40 mg Oral Daily    polyethylene glycol  17 g Oral Daily    pravastatin  20 mg Oral QHS    primaquine  26.3 mg Oral Q24H    psyllium husk (with sugar)  1 packet Oral Daily    spironolactone  25 mg Oral Daily    vitamin D  5,000 Units Oral Daily    warfarin  5 mg Oral Daily    zolpidem  5 mg Oral Nightly           Intake/Output Summary (Last 24 hours) at 7/28/2022 0827  Last data filed at 7/27/2022 1600  Gross per 24 hour   Intake 120 ml   Output 375 ml   Net -255 ml       Vital Signs (Most Recent):  Temp: 97.8 °F (36.6 °C) (07/28/22 0726)  Pulse: 68 (07/28/22 0726)  Resp: (!) 22 (07/28/22 0737)  BP: (!) 144/56 (07/28/22 0726)  SpO2: 99 % (07/28/22 0726)  Body mass index is 32.77 kg/m².  Weight: 83.9 kg (185 lb) Vital Signs (24h Range):  Temp:  [97.8 °F (36.6 °C)-98.5 °F (36.9 °C)] 97.8 °F (36.6 °C)  Pulse:  [66-74] 68  Resp:  [17-24] 22  SpO2:  [82 %-99 %] 99 %  BP: (113-146)/(54-83) 144/56     Physical Exam  Constitutional:       Appearance: Normal appearance.   HENT:      Head: Normocephalic and atraumatic.   Cardiovascular:      Rate and Rhythm: Normal rate.      Heart sounds: Normal heart sounds.   Pulmonary:      Comments: Scattered crackles  Abdominal:      Palpations: Abdomen is soft.   Musculoskeletal:      Cervical back: Neck supple.   Neurological:      Mental Status: She is alert.    Mechanical ventilation support:  Oxygen Concentration (%): 100 (07/27/22 1600)    Lines/Drains/Airways     Drain  Duration                Urethral Catheter 07/25/22 1000  Straight-tip 16 Fr. 2 days          Peripheral Intravenous Line  Duration                Peripheral IV - Single Lumen 07/25/22 1000 Anterior;Right Upper Arm 2 days                Significant Labs:    Lab Results   Component Value Date    WBC 18.2 (H) 07/28/2022    HGB 9.4 (L) 07/28/2022    HCT 28.8 (L) 07/28/2022    .7 (H) 07/28/2022     07/28/2022         BMP  Lab Results   Component Value Date     (L) 07/28/2022    K 4.8 07/28/2022    CL 98 02/25/2021    CO2 24 07/28/2022    BUN 39.8 (H) 07/28/2022    CREATININE 1.16 (H) 07/28/2022    CALCIUM 8.5 07/28/2022    ANIONGAP 5 (L) 02/25/2021    ESTGFRAFRICA >60.0 02/25/2021    EGFRNONAA 48 07/28/2022       ABG  Recent Labs   Lab 07/24/22 1933   PH 7.44   PO2 61*   PCO2 38   HCO3 25.8           Significant Imaging:  I have reviewed all pertinent imaging within the past 24 hours.        Assessment/Plan:     Assessment  1. Pulmonary fibrosis s/t  antisynthetase syndrome, on Cellcept and prednisone per rheumatology  2. Worsening bilateral infiltrates on imaging -- possible progression of ILD vs infectious vs inflammatory vs DAH  3. Acute hypoxemic respiratory failure s/t above  4. Anemia and elevated INR  5. Atrial fibrillation on coumadin  6. Pulmonary HTN, mild by RHC  7. History of DVT and factor V Leiden mutation, on coumadin  8. EWA-stable.  Increased BUN could be secondary to steroids.  9. Hyponatremia-stable      Plan  Continue present therapy for now.  Will try to transition back to Vapotherm later today if possible.  Unfortunately not much to add to present therapy.         KRAIG Sheriff MD  Pulmonary Critical Care Medicine  [facility] - 9th Floor Med Surg

## 2022-07-29 NOTE — CONSULTS
"Consults     Patient Name: Ria Holman   MRN: [id number]   Admission Date: 7/24/2022   Hospital Length of Stay: 5   Attending Provider: Reinaldo Mckay MD   Consulting Provider: Richa GOVEA  Reason for Consult: Goals of Care  Primary Care Physician:  Reinaldo Mckay MD     Principal Problem: Signs and symptoms of severe respiratory distress       Final diagnoses:  [R06.02] Shortness of breath  [J84.9] ILD (interstitial lung disease)  [J96.01] Acute hypoxemic respiratory failure (Primary)  [R79.1] Supratherapeutic INR      Assessment/Plan:     I reviewed the patient and family's understanding of the seriousness of the illness and its expected prognosis. We discussed the patient's goals of care and treatment preferences.     Discussed case with Dr. Sheriff, Dr. Sherman, and MELANIE Rodriguez NP.         Met with patient with son, DIL and other family members present.  Patient states that her SOB is tolerable at this time.  Introduced service to son who has just [place].  Patient states that she spoke with her physicians today and expressed that pulmonology NP had "negative" report.  When I asked her to expound she stated that the NP relayed that pulmonology is suspecting that she has disease progression.  States that they are currently awaiting more lab studies to evaluated possibility of infection. Discussed with family that it is beneficial that patient is able to be involved in her care.  Offered support and informed that palliative care will continue to follow.       Interval History:   Patient in bed with bipap in place and continuing to alternate between bipap and vapotherm.  ID following who is treating for suspicion of PJP.  Palliative medicine continuing to follow for assistance with plan of care.            Active Ambulatory Problems     Diagnosis Date Noted    Chronic cardiopulmonary disease     Pulmonary hypertension 01/19/2021    Interstitial lung disease 01/19/2021    " Shortness of breath 2021    Atrial fibrillation, currently in sinus rhythm 2021    Primary hypertension 2021    Anemia 2022    Antisynthetase syndrome 2022    Atrial fibrillation 2022    Deep vein thrombosis (DVT) 2022    Factor V Leiden mutation 2022    Gastroesophageal reflux disease 2022    Hyperlipidemia 2022    Malignant neoplasm of skin 2022    Temporal arteritis 2022    Hypothyroidism 2022    Tremor 2022    Vitamin D deficiency 2022     Resolved Ambulatory Problems     Diagnosis Date Noted    No Resolved Ambulatory Problems     Past Medical History:   Diagnosis Date    Cancer     Deep vein thrombosis     Diabetes mellitus     GERD (gastroesophageal reflux disease)     Hypertension         Past Surgical History:   Procedure Laterality Date    APPENDECTOMY      BIOPSY OF ADENOIDS      BIOPSY OF TEMPORAL ARTERY       SECTION      FOOT SURGERY      FUNCTIONAL ENDOSCOPIC SINUS SURGERY (FESS)      JOINT REPLACEMENT      hip    RIGHT HEART CATHETERIZATION Right 2021    Procedure: INSERTION, CATHETER, RIGHT HEART;  Surgeon: Bridgette Cheung MD;  Location: [facility] CATH LAB;  Service: Cardiology;  Laterality: Right;    THORACOSCOPY      TONSILLECTOMY          Review of patient's allergies indicates:   Allergen Reactions    Codeine Itching    Doxycycline Itching and Rash    Levofloxacin Nausea Only     Other reaction(s): Acid reflux          Current Facility-Administered Medications:     acetaminophen tablet 650 mg, 650 mg, Oral, Q8H PRN, Reinaldo Mckay MD    acetaminophen tablet 650 mg, 650 mg, Oral, Q8H PRN, Reinaldo Mckay MD    ALPRAZolam tablet 0.25 mg, 0.25 mg, Oral, TID PRN, JEFERSON Pride, 0.25 mg at 22 0849    aspirin chewable tablet 81 mg, 81 mg, Oral, Daily, Reinaldo Mckay MD, 81 mg at 22 0850    bisacodyL suppository 10 mg, 10 mg, Rectal,  Once PRN, JEFERSON Murguia    clindamycin in D5W 600 mg/50 mL IVPB 600 mg, 600 mg, Intravenous, Q8H, JEFERSON Ramirez, Last Rate: 100 mL/hr at 07/29/22 1359, 600 mg at 07/29/22 1359    docusate calcium capsule 240 mg, 240 mg, Oral, Daily, Reinaldo Mckay MD, 240 mg at 07/27/22 0927    famotidine tablet 20 mg, 20 mg, Oral, BID, Reinaldo Mckay MD, 20 mg at 07/29/22 0021    flecainide tablet 100 mg, 100 mg, Oral, BID, Reinaldo Mckay MD, 100 mg at 07/29/22 0851    furosemide tablet 40 mg, 40 mg, Oral, Daily, Reinaldo Mckay MD, 40 mg at 07/29/22 0850    gabapentin capsule 300 mg, 300 mg, Oral, BID, Reinaldo Mckay MD, 300 mg at 07/29/22 0850    HYDROcodone-acetaminophen 5-325 mg per tablet 1 tablet, 1 tablet, Oral, Q4H PRN, Reinaldo Mckay MD, 1 tablet at 07/28/22 0737    hydrOXYzine pamoate capsule 25 mg, 25 mg, Oral, QHS, Reinaldo Mckay MD, 25 mg at 07/29/22 0021    Lactobacillus acidophilus capsule 1 capsule, 1 capsule, Oral, TID WM, Reinaldo Mckay MD, 1 capsule at 07/29/22 1358    levothyroxine tablet 50 mcg, 50 mcg, Oral, Before breakfast, Reinaldo Mckay MD, 50 mcg at 07/29/22 0849    losartan tablet 50 mg, 50 mg, Oral, Daily, Reinaldo Mckay MD, 50 mg at 07/29/22 0850    melatonin tablet 6 mg, 6 mg, Oral, Nightly PRN, Reinaldo Mckay MD    methylPREDNISolone sodium succinate injection 80 mg, 80 mg, Intravenous, Q8H, JEFERSON Pride, 80 mg at 07/29/22 1358    metoprolol succinate (TOPROL-XL) 24 hr tablet 25 mg, 25 mg, Oral, Daily, Reinaldo Mckay MD, 25 mg at 07/29/22 0849    mycophenolate capsule 1,000 mg, 1,000 mg, Oral, BID, Reinaldo Mckay MD, 1,000 mg at 07/29/22 0848    ondansetron injection 4 mg, 4 mg, Intravenous, Q6H PRN, Reinaldo Mckay MD, 4 mg at 07/28/22 0736    pantoprazole EC tablet 40 mg, 40 mg, Oral, Daily, Reinaldo Mckay MD, 40 mg at 07/29/22 0848    polyethylene glycol packet 17  "g, 17 g, Oral, Daily, Reinaldo Mckay MD, 17 g at 07/27/22 0925    pravastatin tablet 20 mg, 20 mg, Oral, QHS, Reinaldo Mckay MD, 20 mg at 07/29/22 0021    primaquine tablet 26.3 mg, 26.3 mg, Oral, Q24H, Sally Vargas, FNP, 26.3 mg at 07/29/22 0850    psyllium husk (with sugar) 3.4 gram packet 1 packet, 1 packet, Oral, Daily, Reinaldo Mckay MD, 1 packet at 07/29/22 0850    sodium chloride 0.9% flush 10 mL, 10 mL, Intravenous, PRN, Reinaldo Mckay MD    spironolactone tablet 25 mg, 25 mg, Oral, Daily, Reinaldo Mckay MD, 25 mg at 07/29/22 0848    vitamin D 1000 units tablet 5,000 Units, 5,000 Units, Oral, Daily, Reinaldo Mckay MD, 5,000 Units at 07/29/22 0849    zolpidem tablet 5 mg, 5 mg, Oral, Nightly, Reinaldo Mckay MD, 5 mg at 07/29/22 0021     acetaminophen, acetaminophen, ALPRAZolam, bisacodyL, HYDROcodone-acetaminophen, melatonin, ondansetron, sodium chloride 0.9%     Family History   Problem Relation Age of Onset    Diabetes Mellitus Mother     Heart failure Mother     Hypertension Mother     Heart attack Father     Hypertension Father     Hypertension Sister           Review of Systems   Constitutional: Positive for activity change and unexpected weight change.   Respiratory: Positive for shortness of breath.             Objective:   /68   Pulse 77   Temp 98.4 °F (36.9 °C) (Oral)   Resp (!) 22   Ht 5' 3" (1.6 m)   Wt 83.9 kg (185 lb)   SpO2 (!) 87%   BMI 32.77 kg/m²      Physical Exam   Constitutional: She is oriented to person, place, and time. She appears ill.   Eyes: Pupils are equal, round, and reactive to light.   Cardiovascular: Normal rate. An irregular rhythm present.   Pulmonary/Chest: She is in respiratory distress. She has rhonchi.   Abdominal: Soft.   Neurological: She is oriented to person, place, and time.   Skin: There is pallor.          Review of Symptoms  Review of Symptoms    Symptom Assessment (ESAS 0-10 Scale)  Pain:  " 0  Dyspnea:  0  Anxiety:  0  Nausea:  0  Depression:  0  Anorexia:  0  Fatigue:  0  Insomnia:  0  Restlessness:  0  Agitation:  0         Bowel Management Plan (BMP):  Yes      Performance Status:  30      Advance Care Planning   Advance Directives:   Do Not Resuscitate Status: Yes      Decision Making:  Patient answered questions and Family answered questions          PAINAD: NA    Caregiver burden formerly assessed: yes      No results displayed because visit has over 200 results.               > 50% of 30 min of encounter was spent in chart review, face to face discussion of goals of care, symptom assessment, coordination of care and emotional support.    Richa OCONNORP, [facility]  Palliative Medicine  [facility] - Observation Unit

## 2022-07-29 NOTE — PROGRESS NOTES
[facility] - 9th Floor Med Surg  Pulmonary Critical Care Note    Patient Name: Ria Holman  MRN: [id number]  Admission Date: 2022  Hospital Length of Stay: 5 days  Code Status: DNR  Attending Provider: Reinaldo Mckay MD  Primary Care Provider: Reinaldo Mckay MD     Subjective:     HPI:   This is a 77-year-old female patient of Dr. 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 She also has a history of DVT in her right lower extremity and factor V Leiden mutation as well as atrial fibrillation, on coumadin. Most recently had been on Cellcept and prednisone by Dr Branch. She had been experiencing increasing dyspnea and cough and was seen in our clinic last week. O2 levels were low and she was initiated on oxygen, started on bactrim and azithromyin and increased her prednisone. A CT scan was obtained at outside hospital which revealed no PE but showed patchy areas of groundglass increasing compared to 2022 per report. She had low oxygen levels that persisted at home despite nasal cannula and she was admitted on 22. Workup revealed anemia, elevated INR, and hypoxemia. Her respiratory culture from last week is with normal respiratory katie and COVID and flu swabs are negative.     24 Hour Interval History:  She continues to alternate vapotherm/bipap. Currently on vapotherm 90% FIO2, 40L. Remains on antibiotics and steroids     Past Medical History:   Diagnosis Date    Atrial fibrillation     Cancer     skin cancer    Deep vein thrombosis     Diabetes mellitus     GERD (gastroesophageal reflux disease)     Hyperlipidemia     Hypertension     Hypothyroidism     Interstitial lung disease        Past Surgical History:   Procedure Laterality Date    APPENDECTOMY      BIOPSY OF ADENOIDS      BIOPSY OF TEMPORAL ARTERY       SECTION      FOOT SURGERY      FUNCTIONAL ENDOSCOPIC SINUS SURGERY (FESS)      JOINT  REPLACEMENT      hip    RIGHT HEART CATHETERIZATION Right 2/25/2021    Procedure: INSERTION, CATHETER, RIGHT HEART;  Surgeon: Bridgette Cheung MD;  Location: [facility] CATH LAB;  Service: Cardiology;  Laterality: Right;    THORACOSCOPY      TONSILLECTOMY         Social History     Socioeconomic History    Marital status:    Tobacco Use    Smoking status: Never Smoker    Smokeless tobacco: Never Used   Substance and Sexual Activity    Alcohol use: Never    Drug use: Never           Objective:     Current Outpatient Medications   Medication Instructions    cholecalciferol, vitamin D3, 125 mcg (5,000 unit) Tab No dose, route, or frequency recorded.    DOCUSATE CALCIUM ORAL No dose, route, or frequency recorded.    flecainide (TAMBOCOR) 100 mg, Oral, 2 times daily    gabapentin (NEURONTIN) 300 MG capsule TAKE ONE CAPSULE TWICE DAILY    gabapentin 300 mg, Oral, Nightly    hydrOXYzine pamoate (VISTARIL) 25 mg, Oral, Nightly    levothyroxine (SYNTHROID) 50 MCG tablet TAKE ONE TABLET ONCE DAILY    loratadine (CLARITIN) 10 mg, Oral    losartan (COZAAR) 50 mg, Oral, Daily    metoprolol succinate (TOPROL-XL) 25 mg, Oral, Daily    mycophenolate (CELLCEPT) 500 mg Tab Oral, 2 times daily    omeprazole (PRILOSEC) 20 mg, Oral, Daily    pravastatin (PRAVACHOL) 20 mg, Oral, Nightly    psyllium 0.52 g, Oral, 2 times daily    Saccharomyces boulardii (FLORASTOR) 250 mg, Oral, 2 times daily    spironolactone (ALDACTONE) 25 MG tablet TAKE ONE TABLET ONCE DAILY    sulfamethoxazole-trimethoprim 800-160mg (BACTRIM DS) 800-160 mg Tab 1 tablet, Oral, 2 times daily    zolpidem (AMBIEN) 5 MG Tab TAKE ONE TABLET AT BEDTIME       Current Inpatient Medications   aspirin  81 mg Oral Daily    clindamycin (CLEOCIN) IVPB  600 mg Intravenous Q8H    docusate calcium  240 mg Oral Daily    famotidine  20 mg Oral BID    flecainide  100 mg Oral BID    furosemide  40 mg Oral Daily    gabapentin  300 mg Oral BID     hydrOXYzine pamoate  25 mg Oral QHS    Lactobacillus acidophilus  1 capsule Oral TID WM    levothyroxine  50 mcg Oral Before breakfast    losartan  50 mg Oral Daily    methylPREDNISolone sodium succinate injection  80 mg Intravenous Q8H    metoprolol succinate  25 mg Oral Daily    mycophenolate  1,000 mg Oral BID    pantoprazole  40 mg Oral Daily    polyethylene glycol  17 g Oral Daily    pravastatin  20 mg Oral QHS    primaquine  26.3 mg Oral Q24H    psyllium husk (with sugar)  1 packet Oral Daily    spironolactone  25 mg Oral Daily    vitamin D  5,000 Units Oral Daily    zolpidem  5 mg Oral Nightly           Intake/Output Summary (Last 24 hours) at 7/29/2022 0922  Last data filed at 7/29/2022 0600  Gross per 24 hour   Intake 400 ml   Output 1300 ml   Net -900 ml       Vital Signs (Most Recent):  Temp: 96.3 °F (35.7 °C) (07/29/22 0700)  Pulse: 80 (07/29/22 0700)  Resp: (!) 26 (07/29/22 0700)  BP: 129/63 (07/29/22 0850)  SpO2: (!) 91 % (07/29/22 0700)  Body mass index is 32.77 kg/m².  Weight: 83.9 kg (185 lb) Vital Signs (24h Range):  Temp:  [96.3 °F (35.7 °C)-98.4 °F (36.9 °C)] 96.3 °F (35.7 °C)  Pulse:  [] 80  Resp:  [18-34] 26  SpO2:  [89 %-99 %] 91 %  BP: (122-136)/(51-63) 129/63     Physical Exam  Constitutional:       Appearance: Normal appearance.   HENT:      Head: Normocephalic and atraumatic.   Cardiovascular:      Rate and Rhythm: Normal rate.      Heart sounds: Normal heart sounds.   Pulmonary:      Comments: Scattered crackles  Abdominal:      Palpations: Abdomen is soft.   Musculoskeletal:      Cervical back: Neck supple.   Neurological:      Mental Status: She is alert.    Mechanical ventilation support:  Oxygen Concentration (%): 90 (07/29/22 0818)    Lines/Drains/Airways     Drain  Duration                Urethral Catheter 07/25/22 1000 Straight-tip 16 Fr. 3 days          Peripheral Intravenous Line  Duration                Peripheral IV - Single Lumen 07/25/22 1000  Anterior;Right Upper Arm 3 days                Significant Labs:    Lab Results   Component Value Date    WBC 18.2 (H) 07/29/2022    HGB 9.4 (L) 07/29/2022    HCT 29.4 (L) 07/29/2022    .9 (H) 07/29/2022     07/29/2022         BMP  Lab Results   Component Value Date     (L) 07/29/2022    K 5.1 07/29/2022    CL 98 02/25/2021    CO2 25 07/29/2022    BUN 35.7 (H) 07/29/2022    CREATININE 0.83 07/29/2022    CALCIUM 9.1 07/29/2022    ANIONGAP 5 (L) 02/25/2021    ESTGFRAFRICA >60.0 02/25/2021    EGFRNONAA >60 07/29/2022       ABG  Recent Labs   Lab 07/24/22 1933   PH 7.44   PO2 61*   PCO2 38   HCO3 25.8           Significant Imaging:  I have reviewed all pertinent imaging within the past 24 hours.        Assessment/Plan:     Assessment  1. Pulmonary fibrosis s/t  antisynthetase syndrome, on Cellcept and prednisone per rheumatology  2. Worsening bilateral infiltrates on imaging -- possible progression of ILD vs infectious vs inflammatory vs DAH  3. Acute hypoxemic respiratory failure s/t above  4. Anemia and elevated INR, improving  5. Atrial fibrillation on coumadin  6. Pulmonary HTN, mild by RHC  7. History of DVT and factor V Leiden mutation, on coumadin  8. EWA-stable.  Increased BUN could be secondary to steroids.  9. Hyponatremia-improving      Plan  Continue vapotherm and bipap if needed  Continue antibiotics per ID  Continue steroids at present dose for now  Will follow         JEFERSON Pride  Pulmonary Critical Care Medicine  [facility] - 9th Floor Med Surg

## 2022-07-29 NOTE — PROGRESS NOTES
Subjective:  The patient feels about the same.  She remains on BiPAP.  75% FiO2.  She did get some rest.    Objective:  She is afebrile blood pressure 136/63 heart rate 80 respiratory rate 20 O2 sat currently 95% she was down to an FiO2 of 90% but her O2 sats dropped to 8089 and so she was bump at    General appearance is unremarkable.  She is in no distress currently but is on BiPAP.  Heart has a regular rate and rhythm.  Lungs clear anteriorly.  Abdomen is nontender.  Extremities without clubbing cyanosis or edema.    Today's lab work reveals a stable H&H 9.4 and 29.4 white count remains high at 18,000 thousand electrolytes stable BUN creatinine have improved a bit to 35 and 0.83 respectively.  INR has gone up to 3.59.  Cultures remain negative    Assessment:  1. Respiratory failure.  More less stable compared to yesterday.    2. Coumadin toxicity.  She did get Coumadin at all yesterday but her INR did extend did overnight    3. Pulmonary fibrosis/anti synthetase syndrome.  Concerns for occult infection, on appropriate antibiotic therapy    4. History of atrial fib status post ablation.  She remains in sinus rhythm    5. History of prior DVT and factor 5 laden mutation    Plan:  Will resume her p.o. meds except for Coumadin.  Will try a full liquid diet and advance as tolerated.  Update blood work in the morning.  Continue supportive care.  Special studies for atypical infection pending

## 2022-07-29 NOTE — PROGRESS NOTES
Infectious Disease  Progress Note    Patient Name: Ria Holman   MRN: [id number]   Admission Date: 7/24/2022   Hospital Length of Stay: 5 days  Attending Physician: Reinaldo Mckay MD   Primary Care Provider: Reinaldo Mckay MD     Isolation Status: No active isolations       Subjective:     Principal Problem: Signs and symptoms of severe respiratory distress     Interval History:   Unfortunately unable to get off the BiPAP today due to desaturation. Otherwise no new symptoms    Review of Systems   Review of Systems   All other systems reviewed and are negative.       Objective:     Vital Signs (Most Recent):  Temp: 98.4 °F (36.9 °C) (07/28/22 2343)  Pulse: 69 (07/28/22 2343)  Resp: 18 (07/28/22 2343)  BP: (!) 130/51 (07/28/22 2343)  SpO2: (!) 92 % (07/28/22 2343)  Vital Signs (24h Range):  Temp:  [97.7 °F (36.5 °C)-98.4 °F (36.9 °C)] 98.4 °F (36.9 °C)  Pulse:  [62-69] 69  Resp:  [18-34] 18  SpO2:  [92 %-99 %] 92 %  BP: (122-144)/(51-56) 130/51      Weight:   Wt Readings from Last 1 Encounters:   07/25/22 83.9 kg (185 lb)      Body mass index is Body mass index is 32.77 kg/m².     Estimated Creatinine Clearance: Estimated Creatinine Clearance: 43 mL/min (A) (based on SCr of 1.16 mg/dL (H)).     Lines/Drains/Airways     Drain  Duration                Urethral Catheter 07/25/22 1000 Straight-tip 16 Fr. 3 days          Peripheral Intravenous Line  Duration                Peripheral IV - Single Lumen 07/25/22 1000 Anterior;Right Upper Arm 3 days                 Physical Exam  Physical Exam  Constitutional:       Appearance: She is ill-appearing.      Comments: On HHFNC with conversational dyspnea   HENT:      Head: Normocephalic and atraumatic.      Mouth/Throat:      Pharynx: No oropharyngeal exudate or posterior oropharyngeal erythema.   Eyes:      Extraocular Movements: Extraocular movements intact.      Pupils: Pupils are equal, round, and reactive to light.   Cardiovascular:      Rate and Rhythm: Normal  rate and regular rhythm.      Heart sounds: No murmur heard.  Pulmonary:      Effort: No respiratory distress.      Breath sounds: Rhonchi present. No wheezing or rales.   Abdominal:      General: Bowel sounds are normal. There is no distension.      Palpations: Abdomen is soft.      Tenderness: There is no abdominal tenderness. There is no right CVA tenderness or left CVA tenderness.   Musculoskeletal:         General: No swelling or tenderness.      Cervical back: Neck supple. No rigidity or tenderness.   Lymphadenopathy:      Cervical: No cervical adenopathy.   Skin:     Findings: No lesion or rash.   Neurological:      General: No focal deficit present.      Mental Status: She is alert and oriented to person, place, and time. Mental status is at baseline.      Cranial Nerves: No cranial nerve deficit.      Motor: Weakness present.   Psychiatric:         Mood and Affect: Mood normal.         Behavior: Behavior normal.          Significant Labs:   CBC:   Recent Labs   Lab 07/27/22  0405 07/28/22  0543   WBC 18.7* 18.2*   HGB 8.8* 9.4*   HCT 26.7* 28.8*    168     CMP:   Recent Labs   Lab 07/27/22  0405 07/28/22  0543   * 129*   K 4.6 4.8   CO2 22* 24   BUN 32.6* 39.8*   CREATININE 1.07* 1.16*   CALCIUM 8.6 8.5   EGFRNONAA 53 48       Microbiology Results (last 7 days)     Procedure Component Value Units Date/Time    Blood Culture #1 **CANNOT BE ORDERED STAT** [[id number]]  (Normal) Collected: 07/24/22 2017    Order Status: Completed Specimen: Blood Updated: 07/28/22 2101     CULTURE, BLOOD ([facility]) No Growth At 96 Hours    Blood Culture #2 **CANNOT BE ORDERED STAT** [[id number]]  (Normal) Collected: 07/24/22 2017    Order Status: Completed Specimen: Blood Updated: 07/28/22 2101     CULTURE, BLOOD ([facility]) No Growth At 96 Hours           Significant Imaging: I have reviewed all pertinent imaging results/findings within the past 24 hours.    Assessment/Plan:      She is a 77-year-old female with a past medical  history of ILD on chronic CellCept and steroids who recently developed dyspnea on exertion as well as hypoxia.  Imaging concerning for atypical process.  Id consult for input.     1. Worsening bilateral pulmonary infiltrates, concerning for atypical infection such as PJP vs progression of ILD vs other  2. 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 Acute hypoxemic respiratory failure  4. Atrial fibrillation / history of DVT / factor V Leiden mutation on Coumadin  5. EWA    -Stable but unable to get off the BiPAP due to desaturation     PLAN:  -Fungitell and PCP PCR pending, added Aspergillus Ag.  -Continue Primaquine and Clindamycin for concern for severe PJP, continue Steroids dosing as recommended by pulmonary (this will help with managing underlying pulmonary disease as well as severe PJP)  -Discussed with patient, daughter in law, and nursing     ID will continue following. Please contact us with any questions or concerns.    Ronnie Sherman MD  Infectious Disease  [facility]

## 2022-07-30 NOTE — PROGRESS NOTES
[facility] - 9th Floor Med Surg  Pulmonary Critical Care Note    Patient Name: Ria Holman  MRN: [id number]  Admission Date: 7/24/2022  Hospital Length of Stay: 6 days  Code Status: DNR  Attending Provider: Reinaldo Mckay MD  Primary Care Provider: Reinaldo Mckay MD     Subjective:     HPI:   This is a 77-year-old female patient of Dr. 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 She also has a history of DVT in her right lower extremity and factor V Leiden mutation as well as atrial fibrillation, on coumadin. Most recently had been on Cellcept and prednisone by Dr Branch. She had been experiencing increasing dyspnea and cough and was seen in our clinic last week. O2 levels were low and she was initiated on oxygen, started on bactrim and azithromyin and increased her prednisone. A CT scan was obtained at outside hospital which revealed no PE but showed patchy areas of groundglass increasing compared to April 2022 per report. She had low oxygen levels that persisted at home despite nasal cannula and she was admitted on 7/24/22. Workup revealed anemia, elevated INR, and hypoxemia. Her respiratory culture from last week is with normal respiratory katie and COVID and flu swabs are negative.     24 Hour Interval History:  She continues to alternate vapotherm/bipap. Currently on vapotherm at 100% FIO2, 40L. Remains on antibiotics and steroids. Nursing reports oxygen desaturations into the 60-70s when being placed on bedpan. Patient has little to no respiratory reserve.  Beta-D-Glucan negative.    Past Medical History:   Diagnosis Date    Atrial fibrillation     Cancer     skin cancer    Deep vein thrombosis     Diabetes mellitus     GERD (gastroesophageal reflux disease)     Hyperlipidemia     Hypertension     Hypothyroidism     Interstitial lung disease        Past Surgical History:   Procedure Laterality Date    APPENDECTOMY      BIOPSY  OF ADENOIDS      BIOPSY OF TEMPORAL ARTERY       SECTION      FOOT SURGERY      FUNCTIONAL ENDOSCOPIC SINUS SURGERY (FESS)      JOINT REPLACEMENT      hip    RIGHT HEART CATHETERIZATION Right 2021    Procedure: INSERTION, CATHETER, RIGHT HEART;  Surgeon: Bridgette Cheung MD;  Location: [facility] CATH LAB;  Service: Cardiology;  Laterality: Right;    THORACOSCOPY      TONSILLECTOMY         Social History     Socioeconomic History    Marital status:    Tobacco Use    Smoking status: Never Smoker    Smokeless tobacco: Never Used   Substance and Sexual Activity    Alcohol use: Never    Drug use: Never           Objective:     Current Outpatient Medications   Medication Instructions    cholecalciferol, vitamin D3, 125 mcg (5,000 unit) Tab No dose, route, or frequency recorded.    DOCUSATE CALCIUM ORAL No dose, route, or frequency recorded.    flecainide (TAMBOCOR) 100 mg, Oral, 2 times daily    gabapentin (NEURONTIN) 300 MG capsule TAKE ONE CAPSULE TWICE DAILY    gabapentin 300 mg, Oral, Nightly    hydrOXYzine pamoate (VISTARIL) 25 mg, Oral, Nightly    levothyroxine (SYNTHROID) 50 MCG tablet TAKE ONE TABLET ONCE DAILY    loratadine (CLARITIN) 10 mg, Oral    losartan (COZAAR) 50 mg, Oral, Daily    metoprolol succinate (TOPROL-XL) 25 mg, Oral, Daily    mycophenolate (CELLCEPT) 500 mg Tab Oral, 2 times daily    omeprazole (PRILOSEC) 20 mg, Oral, Daily    pravastatin (PRAVACHOL) 20 mg, Oral, Nightly    psyllium 0.52 g, Oral, 2 times daily    Saccharomyces boulardii (FLORASTOR) 250 mg, Oral, 2 times daily    spironolactone (ALDACTONE) 25 MG tablet TAKE ONE TABLET ONCE DAILY    sulfamethoxazole-trimethoprim 800-160mg (BACTRIM DS) 800-160 mg Tab 1 tablet, Oral, 2 times daily    zolpidem (AMBIEN) 5 MG Tab TAKE ONE TABLET AT BEDTIME       Current Inpatient Medications   aspirin  81 mg Oral Daily    clindamycin (CLEOCIN) IVPB  600 mg Intravenous Q8H    docusate calcium  240 mg Oral  Daily    famotidine  20 mg Oral BID    flecainide  100 mg Oral BID    furosemide  40 mg Oral Daily    gabapentin  300 mg Oral BID    hydrOXYzine pamoate  25 mg Oral QHS    Lactobacillus acidophilus  1 capsule Oral TID WM    levothyroxine  50 mcg Oral Before breakfast    losartan  50 mg Oral Daily    methylPREDNISolone sodium succinate injection  80 mg Intravenous Q8H    metoprolol succinate  25 mg Oral Daily    mycophenolate  1,000 mg Oral BID    pantoprazole  40 mg Oral Daily    polyethylene glycol  17 g Oral Daily    pravastatin  20 mg Oral QHS    primaquine  26.3 mg Oral Q24H    psyllium husk (with sugar)  1 packet Oral Daily    spironolactone  25 mg Oral Daily    vitamin D  5,000 Units Oral Daily    zolpidem  5 mg Oral Nightly           Intake/Output Summary (Last 24 hours) at 7/30/2022 0955  Last data filed at 7/30/2022 0400  Gross per 24 hour   Intake 360 ml   Output 1350 ml   Net -990 ml       Vital Signs (Most Recent):  Temp: 97.7 °F (36.5 °C) (07/30/22 0700)  Pulse: 66 (07/30/22 0700)  Resp: 17 (07/30/22 0700)  BP: (!) 136/56 (07/30/22 0700)  SpO2: (!) 94 % (07/30/22 0920)  Body mass index is 32.77 kg/m².  Weight: 83.9 kg (185 lb) Vital Signs (24h Range):  Temp:  [97.4 °F (36.3 °C)-98.4 °F (36.9 °C)] 97.7 °F (36.5 °C)  Pulse:  [55-79] 66  Resp:  [17-26] 17  SpO2:  [87 %-95 %] 94 %  BP: (117-137)/(55-71) 136/56     Physical Exam  Constitutional:       Appearance: Normal appearance.   HENT:      Head: Normocephalic and atraumatic.   Cardiovascular:      Rate and Rhythm: Normal rate.      Heart sounds: Normal heart sounds.   Pulmonary:      Comments: Scattered crackles  Abdominal:      Palpations: Abdomen is soft.   Musculoskeletal:      Cervical back: Neck supple.   Neurological:      Mental Status: She is alert.    Mechanical ventilation support:  Oxygen Concentration (%): 80 (07/30/22 0920)    Lines/Drains/Airways     Drain  Duration                Urethral Catheter 07/25/22 1000  Straight-tip 16 Fr. 4 days          Peripheral Intravenous Line  Duration                Peripheral IV - Single Lumen 07/25/22 1000 Anterior;Right Upper Arm 4 days                Significant Labs:    Lab Results   Component Value Date    WBC 17.0 (H) 07/30/2022    HGB 9.5 (L) 07/30/2022    HCT 30.1 (L) 07/30/2022    .9 (H) 07/30/2022     07/30/2022         BMP  Lab Results   Component Value Date     (L) 07/30/2022    K 5.1 07/30/2022    CL 98 02/25/2021    CO2 28 07/30/2022    BUN 41.3 (H) 07/30/2022    CREATININE 0.89 07/30/2022    CALCIUM 8.9 07/30/2022    ANIONGAP 5 (L) 02/25/2021    ESTGFRAFRICA >60.0 02/25/2021    EGFRNONAA >60 07/30/2022       ABG  Recent Labs   Lab 07/24/22 1933   PH 7.44   PO2 61*   PCO2 38   HCO3 25.8           Significant Imaging:  I have reviewed all pertinent imaging within the past 24 hours.        Assessment/Plan:     Assessment  1. Pulmonary fibrosis s/t  antisynthetase syndrome, on Cellcept and prednisone per rheumatology  2. Worsening bilateral infiltrates on imaging -- possible progression of ILD vs infectious vs inflammatory vs DAH  3. Acute hypoxemic respiratory failure s/t above  4. Anemia and elevated INR, improving  5. Atrial fibrillation on coumadin  6. Pulmonary HTN, mild by RHC  7. History of DVT and factor V Leiden mutation, on coumadin  8. EWA-stable.  Increased BUN could be secondary to steroids.  9. Hyponatremia-improving      Plan  - Continue vapotherm as tolerated during the day and with meals. BiPAP at night and if needed for episodes of desaturation.  - Continue antibiotics per ID  - Continue steroids at present dose for now  - Beta-D-Glucan negative, PJP is less likely however ID will continue treatment given clinical picture.  - Will try to reorder PJP PCR, does not appear that this sample was ever collected and sent to micro. Called and informed nurse.  - Long and in depth discussion with patient and daughter who is at bedside about current  respiratory status.   - Patient is a DNR  - Will continue to follow       JEFERSON Sims  Pulmonary Critical Care Medicine  [facility] - 9th Floor Med Surg

## 2022-07-30 NOTE — PROGRESS NOTES
"Nutrition   Progress Note      Recommendations:  1. Advance diet as tolerated to diabetic  2. RD to discontinue boost order  3. RD to monitor po intake and weight changes      Reason for Evaluation:  Length of Stay    Diagnosis:    1. Acute hypoxemic respiratory failure    2. Shortness of breath    3. ILD (interstitial lung disease)    4. Supratherapeutic INR        Relevant Medical History:    Past Medical History:   Diagnosis Date    Atrial fibrillation     Cancer     skin cancer    Deep vein thrombosis     Diabetes mellitus     GERD (gastroesophageal reflux disease)     Hyperlipidemia     Hypertension     Hypothyroidism     Interstitial lung disease          Nutrition Diet History:    Factors affecting nutritional intake: decreased appetite and nausea / vomiting    Food / Christianity / Culture Preferences:  n/a      Nutrition Prescription Ordered:    Current Diet Order: full liquid    Appetite:  Fair (50% - 75% po intake)    PO intake: 50 - 75 %      Labs / Medications / Procedures:    Nutrition Related Medications: suppository PRN, pepcid BID, lasix daily, solumedrol, zofran PRN, protonix daily, miralax daily, vit D daily, NaCl PRN    Nutrition Related Labs:  7/30: WBC-17, RBC-2.87, H/H-9.5/30.1, Na-135, BUN-41.3, glu-139      Anthropometrics:  Height: 5' 3" (1.6 m)  Admit Weight:  Weight: 83.9 kg (185 lb)  Latest Weight:  83.9 kg (185 lb)    Wt Readings from Last 5 Encounters:   07/25/22 83.9 kg (185 lb)   07/18/22 87.1 kg (192 lb)   07/13/22 84.4 kg (186 lb)   12/28/21 85.4 kg (188 lb 4.4 oz)   02/25/21 81.1 kg (178 lb 10.9 oz)     IBW: 52.3 kg  %IBW: 160.4%  UBW: 83.9 kg  %Weight Change: 0%  Body mass index is 32.77 kg/m².  BMI classification:  Obese Grade I (BMI 30 - 34.9)      Nutrition Narrative:  7/30: pt reports fair appetite, eating 50% of meals since admission, stating decreased appetite past month. Pt is receiving boost, however does not like them and is requesting to d/c order. Pt getting " smoothies from outside, as well as 50% po intake of meals. Pt complained of occasional nausea and loose stools. UBW reported 83.9 kg (185 lb) with no weight loss prior to admission. Weight not taken at this time; pt on lasix, so likely fluid weight. Will continue to monitor weight.    Monitoring and Evaluation:    Nutrition Monitoring and Evaluation:  food and beverage intake and weight change    Nutrition Risk:  Level of Nutrition Risk:  Low  Frequency of Follow up:  Dietitian will f/up within 7 days.      Lilia Portillo, Registration Eligible, Provisional LDN

## 2022-07-30 NOTE — PROGRESS NOTES
Infectious Disease  Progress Note    Patient Name: Ria Holman   MRN: [id number]   Admission Date: 7/24/2022   Hospital Length of Stay: 5 days  Attending Physician: Reinaldo Mckay MD   Primary Care Provider: Reinaldo Mckay MD     Isolation Status: No active isolations       Subjective:     Principal Problem: Signs and symptoms of severe respiratory distress     Interval History:   Improving slightly today, family at bedside. No fevers, no chills. Tolerating time off the BiPAP today    Review of Systems   Review of Systems   All other systems reviewed and are negative.       Objective:     Vital Signs (Most Recent):  Temp: 97.4 °F (36.3 °C) (07/29/22 1927)  Pulse: 71 (07/29/22 1927)  Resp: 18 (07/29/22 1927)  BP: 137/71 (07/29/22 1927)  SpO2: (!) 89 % (07/29/22 1927)  Vital Signs (24h Range):  Temp:  [96.3 °F (35.7 °C)-98.4 °F (36.9 °C)] 97.4 °F (36.3 °C)  Pulse:  [] 71  Resp:  [18-31] 18  SpO2:  [87 %-99 %] 89 %  BP: (125-137)/(51-71) 137/71      Weight:   Wt Readings from Last 1 Encounters:   07/25/22 83.9 kg (185 lb)      Body mass index is Body mass index is 32.77 kg/m².     Estimated Creatinine Clearance: Estimated Creatinine Clearance: 60.1 mL/min (based on SCr of 0.83 mg/dL).     Lines/Drains/Airways     Drain  Duration                Urethral Catheter 07/25/22 1000 Straight-tip 16 Fr. 4 days          Peripheral Intravenous Line  Duration                Peripheral IV - Single Lumen 07/25/22 1000 Anterior;Right Upper Arm 4 days                 Physical Exam  Physical Exam  Constitutional:       Appearance: She is ill-appearing.      Comments: On HHFNC with conversational dyspnea   HENT:      Head: Normocephalic and atraumatic.      Mouth/Throat:      Pharynx: No oropharyngeal exudate or posterior oropharyngeal erythema.   Eyes:      Extraocular Movements: Extraocular movements intact.      Pupils: Pupils are equal, round, and reactive to light.   Cardiovascular:      Rate and Rhythm: Normal  rate and regular rhythm.      Heart sounds: No murmur heard.  Pulmonary:      Effort: No respiratory distress.      Breath sounds: Rhonchi present. No wheezing or rales.   Abdominal:      General: Bowel sounds are normal. There is no distension.      Palpations: Abdomen is soft.      Tenderness: There is no abdominal tenderness. There is no right CVA tenderness or left CVA tenderness.   Musculoskeletal:         General: No swelling or tenderness.      Cervical back: Neck supple. No rigidity or tenderness.   Lymphadenopathy:      Cervical: No cervical adenopathy.   Skin:     Findings: No lesion or rash.   Neurological:      General: No focal deficit present.      Mental Status: She is alert and oriented to person, place, and time. Mental status is at baseline.      Cranial Nerves: No cranial nerve deficit.      Motor: Weakness present.   Psychiatric:         Mood and Affect: Mood normal.         Behavior: Behavior normal.          Significant Labs:   CBC:   Recent Labs   Lab 07/28/22 0543 07/29/22 0357   WBC 18.2* 18.2*   HGB 9.4* 9.4*   HCT 28.8* 29.4*    162     CMP:   Recent Labs   Lab 07/28/22 0543 07/29/22 0357   * 134*   K 4.8 5.1   CO2 24 25   BUN 39.8* 35.7*   CREATININE 1.16* 0.83   CALCIUM 8.5 9.1   ALBUMIN  --  2.6*   BILITOT  --  0.5   ALKPHOS  --  95   AST  --  36*   ALT  --  29   EGFRNONAA 48 >60       Microbiology Results (last 7 days)     Procedure Component Value Units Date/Time    Blood Culture #1 **CANNOT BE ORDERED STAT** [[id number]]  (Normal) Collected: 07/24/22 2017    Order Status: Completed Specimen: Blood Updated: 07/28/22 2101     CULTURE, BLOOD ([facility]) No Growth At 96 Hours    Blood Culture #2 **CANNOT BE ORDERED STAT** [[id number]]  (Normal) Collected: 07/24/22 2017    Order Status: Completed Specimen: Blood Updated: 07/28/22 2101     CULTURE, BLOOD ([facility]) No Growth At 96 Hours           Significant Imaging: I have reviewed all pertinent imaging results/findings within the  past 24 hours.    Assessment/Plan:      She is a 77-year-old female with a past medical history of ILD on chronic CellCept and steroids who recently developed dyspnea on exertion as well as hypoxia.  Imaging concerning for atypical process.  Id consult for input.     1. Worsening bilateral pulmonary infiltrates, concerning for atypical infection such as PJP vs progression of ILD vs other  2. 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 Acute hypoxemic respiratory failure  4. Atrial fibrillation / history of DVT / factor V Leiden mutation on Coumadin  5. EWA    -on and off BiPAP today, spending more time on Vapotherm  -Beta-D-Glucan negative  -The negative Beta-D-Glucan decreases the likelihood of of this being active PJP however given clinical picture and elevated LDH, would prefer to continue with the current therapy     PLAN:  -PJP PCR pending, added Aspergillus Ag.  -Continue Primaquine and Clindamycin for concern for severe PJP, continue Steroids dosing as recommended by pulmonary (this will help with managing underlying pulmonary disease as well as severe PJP)  -Discussed with patient, daughter in law, and nursing     ID will continue following. Please contact us with any questions or concerns.    Ronnie Sherman MD  Infectious Disease  [facility]

## 2022-07-30 NOTE — HPI
Ria is a patient Dr. Mckay who was admitted with acute respiratory failure given her history of pulmonary fibrosis.  She does alternate between Vapotherm and BiPAP.  She is currently on Vapotherm and FiO2 100%.  SpO2 95%.  She is overall stable

## 2022-07-30 NOTE — SUBJECTIVE & OBJECTIVE
Review of Systems   Constitutional: Negative.  Negative for chills and fever.   HENT: Negative.     Eyes: Negative.    Respiratory: Negative.  Negative for cough and shortness of breath.    Cardiovascular: Negative.  Negative for chest pain, palpitations and leg swelling.   Gastrointestinal: Negative.  Negative for abdominal distention, abdominal pain, constipation, diarrhea, nausea and vomiting.   Endocrine: Negative.    Genitourinary: Negative.  Negative for dysuria and hematuria.   Musculoskeletal: Negative.  Negative for arthralgias, back pain and joint swelling.   Skin: Negative.  Negative for rash.   Allergic/Immunologic: Negative.    Neurological: Negative.  Negative for dizziness, seizures and headaches.   Hematological: Negative.    Psychiatric/Behavioral: Negative.     Objective:     Vital Signs (Most Recent):  Temp: 98.1 °F (36.7 °C) (07/30/22 1556)  Pulse: 69 (07/30/22 1556)  Resp: (!) 24 (07/30/22 1556)  BP: 125/64 (07/30/22 1556)  SpO2: (!) 93 % (07/30/22 1556)   Vital Signs (24h Range):  Temp:  [97.4 °F (36.3 °C)-98.1 °F (36.7 °C)] 98.1 °F (36.7 °C)  Pulse:  [55-71] 69  Resp:  [17-24] 24  SpO2:  [89 %-95 %] 93 %  BP: (117-137)/(55-71) 125/64     Weight: 83.9 kg (185 lb)  Body mass index is 32.77 kg/m².    Intake/Output Summary (Last 24 hours) at 7/30/2022 1601  Last data filed at 7/30/2022 0400  Gross per 24 hour   Intake 240 ml   Output 1350 ml   Net -1110 ml      Physical Exam  Eyes:      Pupils: Pupils are equal, round, and reactive to light.   Cardiovascular:      Rate and Rhythm: Normal rate.      Pulses: Normal pulses.      Heart sounds: No murmur heard.  Pulmonary:      Effort: Pulmonary effort is normal.      Breath sounds: Normal breath sounds.   Abdominal:      General: Abdomen is flat. Bowel sounds are normal. There is no distension.      Palpations: Abdomen is soft.      Tenderness: There is no guarding.   Musculoskeletal:         General: Normal range of motion.      Cervical back:  Normal range of motion and neck supple.   Skin:     General: Skin is warm.   Neurological:      General: No focal deficit present.      Mental Status: She is alert.   Psychiatric:         Mood and Affect: Mood normal.         Behavior: Behavior normal.       Significant Labs: All pertinent labs within the past 24 hours have been reviewed.  Recent Lab Results         07/30/22  0341        Anion Gap 8.0       Baso # 0.03       Basophil % 0.2       BUN 41.3       BUN/CREAT RATIO 46       Calcium 8.9       Chloride 99       CO2 28       Creatinine 0.89       eGFR if non African American >60       Eos # 0.00       Eosinophil % 0.0       Glucose 139       Hematocrit 30.1       Hemoglobin 9.5       Immature Grans (Abs) 0.47       Immature Granulocytes 2.8       INR 3.04       Lymph # 0.69       LYMPH % 4.1       MCH 33.1       MCHC 31.6       .9       Mono # 0.68       Mono % 4.0       MPV 10.1       Neut # 15.1       Neut % 88.9       nRBC 0.0       Platelets 146       Potassium 5.1       Protime 30.8       RBC 2.87       RDW 12.7       Sodium 135       WBC 17.0               Significant Imaging: I have reviewed all pertinent imaging results/findings within the past 24 hours.

## 2022-07-30 NOTE — PROGRESS NOTES
[facility] - 9th Floor [facility] Medicine  Progress Note    Patient Name: Ria Holman  MRN: [id number]  Patient Class: IP- Inpatient   Admission Date: 7/24/2022  Length of Stay: 6 days  Attending Physician: Reinaldo Mckay MD  Primary Care Provider: Reinaldo Mckay MD        Subjective:     Principal Problem:Signs and symptoms of severe respiratory distress        HPI:  Ria is a patient Dr. Mckay who was admitted with acute respiratory failure given her history of pulmonary fibrosis.  She does alternate between Vapotherm and BiPAP.  She is currently on Vapotherm and FiO2 100%.  SpO2 95%.  She is overall stable      Overview/Hospital Course:  No notes on file        Review of Systems   Constitutional: Negative.  Negative for chills and fever.   HENT: Negative.     Eyes: Negative.    Respiratory: Negative.  Negative for cough and shortness of breath.    Cardiovascular: Negative.  Negative for chest pain, palpitations and leg swelling.   Gastrointestinal: Negative.  Negative for abdominal distention, abdominal pain, constipation, diarrhea, nausea and vomiting.   Endocrine: Negative.    Genitourinary: Negative.  Negative for dysuria and hematuria.   Musculoskeletal: Negative.  Negative for arthralgias, back pain and joint swelling.   Skin: Negative.  Negative for rash.   Allergic/Immunologic: Negative.    Neurological: Negative.  Negative for dizziness, seizures and headaches.   Hematological: Negative.    Psychiatric/Behavioral: Negative.     Objective:     Vital Signs (Most Recent):  Temp: 98.1 °F (36.7 °C) (07/30/22 1556)  Pulse: 69 (07/30/22 1556)  Resp: (!) 24 (07/30/22 1556)  BP: 125/64 (07/30/22 1556)  SpO2: (!) 93 % (07/30/22 1556)   Vital Signs (24h Range):  Temp:  [97.4 °F (36.3 °C)-98.1 °F (36.7 °C)] 98.1 °F (36.7 °C)  Pulse:  [55-71] 69  Resp:  [17-24] 24  SpO2:  [89 %-95 %] 93 %  BP: (117-137)/(55-71) 125/64     Weight: 83.9 kg (185 lb)  Body mass index is 32.77  kg/m².    Intake/Output Summary (Last 24 hours) at 7/30/2022 1601  Last data filed at 7/30/2022 0400  Gross per 24 hour   Intake 240 ml   Output 1350 ml   Net -1110 ml      Physical Exam  Eyes:      Pupils: Pupils are equal, round, and reactive to light.   Cardiovascular:      Rate and Rhythm: Normal rate.      Pulses: Normal pulses.      Heart sounds: No murmur heard.  Pulmonary:      Effort: Pulmonary effort is normal.      Breath sounds: Normal breath sounds.   Abdominal:      General: Abdomen is flat. Bowel sounds are normal. There is no distension.      Palpations: Abdomen is soft.      Tenderness: There is no guarding.   Musculoskeletal:         General: Normal range of motion.      Cervical back: Normal range of motion and neck supple.   Skin:     General: Skin is warm.   Neurological:      General: No focal deficit present.      Mental Status: She is alert.   Psychiatric:         Mood and Affect: Mood normal.         Behavior: Behavior normal.       Significant Labs: All pertinent labs within the past 24 hours have been reviewed.  Recent Lab Results         07/30/22  0341        Anion Gap 8.0       Baso # 0.03       Basophil % 0.2       BUN 41.3       BUN/CREAT RATIO 46       Calcium 8.9       Chloride 99       CO2 28       Creatinine 0.89       eGFR if non African American >60       Eos # 0.00       Eosinophil % 0.0       Glucose 139       Hematocrit 30.1       Hemoglobin 9.5       Immature Grans (Abs) 0.47       Immature Granulocytes 2.8       INR 3.04       Lymph # 0.69       LYMPH % 4.1       MCH 33.1       MCHC 31.6       .9       Mono # 0.68       Mono % 4.0       MPV 10.1       Neut # 15.1       Neut % 88.9       nRBC 0.0       Platelets 146       Potassium 5.1       Protime 30.8       RBC 2.87       RDW 12.7       Sodium 135       WBC 17.0               Significant Imaging: I have reviewed all pertinent imaging results/findings within the past 24 hours.      Assessment/Plan:      No notes have  been filed under this hospital service.  Service: Hospital Medicine    VTE Risk Mitigation (From admission, onward)         Ordered     IP VTE HIGH RISK PATIENT  Once         07/25/22 0059                Discharge Planning   GILBERT: 7/30/2022     Code Status: DNR   Is the patient medically ready for discharge?:     Reason for patient still in hospital (select all that apply): Treatment         One.  Pulmonary fibrosis  2. Acute respiratory failure  3. Possible underlying atypical pneumonia  Four.  Coumadin toxicity  5. History of AFib  6. History of DVT    Plan:  One.  INR still slightly supratherapeutic will hold for another day  2. Advanced diet as tolerated  3. Continue supportive care  4. Follow-up with Pulmonary recommendations  5. Overall stable continue current therapy          MENA CONCEPCION MD  Department of Hospital Medicine   [facility] - 9th Floor Med Surg

## 2022-07-31 NOTE — PROGRESS NOTES
[facility] - 9th Floor [facility] Medicine  Progress Note    Patient Name: Ria Holman  MRN: [id number]  Patient Class: IP- Inpatient   Admission Date: 7/24/2022  Length of Stay: 7 days  Attending Physician: Reinaldo Mckay MD  Primary Care Provider: Reinaldo Mckay MD        Subjective:     Principal Problem:Signs and symptoms of severe respiratory distress        HPI:  Ria is a patient Dr. Mckay who was admitted with acute respiratory failure given her history of pulmonary fibrosis.  She does alternate between Vapotherm and BiPAP.  She is currently on Vapotherm and FiO2 100%.  SpO2 95%.  She is overall stable      Overview/Hospital Course:  No notes on file    Interval History:  Overall stable  She is currently on BiPAP during her nap  But during the day she tolerates Vapotherm maintaining SpO2 95% or so.  Family asking to increase the Metamucil and probiotics given her bowels  Overall stable  Vital signs is stable afebrile    Review of Systems   Constitutional: Negative.  Negative for chills and fever.   HENT: Negative.     Eyes: Negative.    Respiratory: Negative.  Negative for cough and shortness of breath.    Cardiovascular: Negative.  Negative for chest pain, palpitations and leg swelling.   Gastrointestinal: Negative.  Negative for abdominal distention, abdominal pain, constipation, diarrhea, nausea and vomiting.   Endocrine: Negative.    Genitourinary: Negative.  Negative for dysuria and hematuria.   Musculoskeletal: Negative.  Negative for arthralgias, back pain and joint swelling.   Skin: Negative.  Negative for rash.   Allergic/Immunologic: Negative.    Neurological: Negative.  Negative for dizziness, seizures and headaches.   Hematological: Negative.    Psychiatric/Behavioral: Negative.     Objective:     Vital Signs (Most Recent):  Temp: 98 °F (36.7 °C) (07/31/22 1130)  Pulse: 70 (07/31/22 1130)  Resp: 18 (07/31/22 0815)  BP: (!) 163/52 (07/31/22 1130)  SpO2:  (!) 84 % (07/31/22 1130)   Vital Signs (24h Range):  Temp:  [97.8 °F (36.6 °C)-98.3 °F (36.8 °C)] 98 °F (36.7 °C)  Pulse:  [60-74] 70  Resp:  [18-24] 18  SpO2:  [84 %-94 %] 84 %  BP: (111-163)/(52-74) 163/52     Weight: 83.9 kg (185 lb)  Body mass index is 32.77 kg/m².    Intake/Output Summary (Last 24 hours) at 7/31/2022 1554  Last data filed at 7/30/2022 1600  Gross per 24 hour   Intake 50 ml   Output 1000 ml   Net -950 ml      Physical Exam  Eyes:      Pupils: Pupils are equal, round, and reactive to light.   Cardiovascular:      Rate and Rhythm: Normal rate.      Pulses: Normal pulses.      Heart sounds: No murmur heard.  Pulmonary:      Effort: Pulmonary effort is normal.      Breath sounds: Normal breath sounds.   Abdominal:      General: Abdomen is flat. Bowel sounds are normal. There is no distension.      Palpations: Abdomen is soft.      Tenderness: There is no guarding.   Musculoskeletal:         General: Normal range of motion.      Cervical back: Normal range of motion and neck supple.   Skin:     General: Skin is warm.   Neurological:      General: No focal deficit present.      Mental Status: She is alert.   Psychiatric:         Mood and Affect: Mood normal.         Behavior: Behavior normal.       Significant Labs: All pertinent labs within the past 24 hours have been reviewed.  Recent Lab Results       None            Significant Imaging: I have reviewed all pertinent imaging results/findings within the past 24 hours.      Assessment/Plan:      No notes have been filed under this hospital service.  Service: Hospital Medicine    VTE Risk Mitigation (From admission, onward)         Ordered     IP VTE HIGH RISK PATIENT  Once         07/25/22 0059                Discharge Planning   GILBERT: 7/30/2022     Code Status: DNR   Is the patient medically ready for discharge?:     Reason for patient still in hospital (select all that apply): Treatment         One.  Pulmonary fibrosis  2. Acute respiratory  failure  3. Possible underlying atypical pneumonia  Four.  Coumadin toxicity  5. History of AFib  6. History of DVT    plan  1. Update labs, PT/INR, cmp, cbc  2. Advance diet as tolerated  3. abx per ID  4. Cont bipap/vapotherm  5. PT/OT          MENA CONCEPCION MD  Department of Hospital Medicine   [facility] - 9th Floor Med Surg

## 2022-07-31 NOTE — SUBJECTIVE & OBJECTIVE
Interval History:  Overall stable  She is currently on BiPAP during her nap  But during the day she tolerates Vapotherm maintaining SpO2 95% or so.  Family asking to increase the Metamucil and probiotics given her bowels  Overall stable  Vital signs is stable afebrile    Review of Systems   Constitutional: Negative.  Negative for chills and fever.   HENT: Negative.     Eyes: Negative.    Respiratory: Negative.  Negative for cough and shortness of breath.    Cardiovascular: Negative.  Negative for chest pain, palpitations and leg swelling.   Gastrointestinal: Negative.  Negative for abdominal distention, abdominal pain, constipation, diarrhea, nausea and vomiting.   Endocrine: Negative.    Genitourinary: Negative.  Negative for dysuria and hematuria.   Musculoskeletal: Negative.  Negative for arthralgias, back pain and joint swelling.   Skin: Negative.  Negative for rash.   Allergic/Immunologic: Negative.    Neurological: Negative.  Negative for dizziness, seizures and headaches.   Hematological: Negative.    Psychiatric/Behavioral: Negative.     Objective:     Vital Signs (Most Recent):  Temp: 98 °F (36.7 °C) (07/31/22 1130)  Pulse: 70 (07/31/22 1130)  Resp: 18 (07/31/22 0815)  BP: (!) 163/52 (07/31/22 1130)  SpO2: (!) 84 % (07/31/22 1130)   Vital Signs (24h Range):  Temp:  [97.8 °F (36.6 °C)-98.3 °F (36.8 °C)] 98 °F (36.7 °C)  Pulse:  [60-74] 70  Resp:  [18-24] 18  SpO2:  [84 %-94 %] 84 %  BP: (111-163)/(52-74) 163/52     Weight: 83.9 kg (185 lb)  Body mass index is 32.77 kg/m².    Intake/Output Summary (Last 24 hours) at 7/31/2022 1554  Last data filed at 7/30/2022 1600  Gross per 24 hour   Intake 50 ml   Output 1000 ml   Net -950 ml      Physical Exam  Eyes:      Pupils: Pupils are equal, round, and reactive to light.   Cardiovascular:      Rate and Rhythm: Normal rate.      Pulses: Normal pulses.      Heart sounds: No murmur heard.  Pulmonary:      Effort: Pulmonary effort is normal.      Breath sounds: Normal  breath sounds.   Abdominal:      General: Abdomen is flat. Bowel sounds are normal. There is no distension.      Palpations: Abdomen is soft.      Tenderness: There is no guarding.   Musculoskeletal:         General: Normal range of motion.      Cervical back: Normal range of motion and neck supple.   Skin:     General: Skin is warm.   Neurological:      General: No focal deficit present.      Mental Status: She is alert.   Psychiatric:         Mood and Affect: Mood normal.         Behavior: Behavior normal.       Significant Labs: All pertinent labs within the past 24 hours have been reviewed.  Recent Lab Results       None            Significant Imaging: I have reviewed all pertinent imaging results/findings within the past 24 hours.

## 2022-08-01 NOTE — PROGRESS NOTES
[facility] - 9th Floor Med Surg  Pulmonary Critical Care Note    Patient Name: Ria Holman  MRN: [id number]  Admission Date: 7/24/2022  Hospital Length of Stay: 8 days  Code Status: DNR  Attending Provider: Reinaldo Mckay MD  Primary Care Provider: Reinaldo Mckay MD     Subjective:     HPI:   This is a 77-year-old female patient of Dr. 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 She also has a history of DVT in her right lower extremity and factor V Leiden mutation as well as atrial fibrillation, on coumadin. Most recently had been on Cellcept and prednisone by Dr Branch. She had been experiencing increasing dyspnea and cough and was seen in our clinic last week. O2 levels were low and she was initiated on oxygen, started on bactrim and azithromyin and increased her prednisone. A CT scan was obtained at outside hospital which revealed no PE but showed patchy areas of groundglass increasing compared to April 2022 per report. She had low oxygen levels that persisted at home despite nasal cannula and she was admitted on 7/24/22. Workup revealed anemia, elevated INR, and hypoxemia. Her respiratory culture from last week is with normal respiratory katie and COVID and flu swabs are negative.     24 Hour Interval History:  NAEON. She continues to alternate vapotherm/bipap between days/nights respectively. Patient reports feeling better but has difficulty with deep inspiration. Currently on vapotherm at 100% FIO2, 40L. Remains on antibiotics and steroids. Per nurse report; patient able to move to bed-side chair however unfortunately desaturated to 72%, though recovered with time. Patient has little to no respiratory reserve. Beta-D-Glucan negative, pending PJP PCR.     Past Medical History:   Diagnosis Date    Atrial fibrillation     Cancer     skin cancer    Deep vein thrombosis     Diabetes mellitus     GERD (gastroesophageal reflux disease)      Hyperlipidemia     Hypertension     Hypothyroidism     Interstitial lung disease        Past Surgical History:   Procedure Laterality Date    APPENDECTOMY      BIOPSY OF ADENOIDS      BIOPSY OF TEMPORAL ARTERY       SECTION      FOOT SURGERY      FUNCTIONAL ENDOSCOPIC SINUS SURGERY (FESS)      JOINT REPLACEMENT      hip    RIGHT HEART CATHETERIZATION Right 2021    Procedure: INSERTION, CATHETER, RIGHT HEART;  Surgeon: Bridgette Cheung MD;  Location: [facility] CATH LAB;  Service: Cardiology;  Laterality: Right;    THORACOSCOPY      TONSILLECTOMY         Social History     Socioeconomic History    Marital status:    Tobacco Use    Smoking status: Never Smoker    Smokeless tobacco: Never Used   Substance and Sexual Activity    Alcohol use: Never    Drug use: Never           Objective:     Current Outpatient Medications   Medication Instructions    cholecalciferol, vitamin D3, 125 mcg (5,000 unit) Tab No dose, route, or frequency recorded.    DOCUSATE CALCIUM ORAL No dose, route, or frequency recorded.    flecainide (TAMBOCOR) 100 mg, Oral, 2 times daily    gabapentin (NEURONTIN) 300 MG capsule TAKE ONE CAPSULE TWICE DAILY    gabapentin 300 mg, Oral, Nightly    hydrOXYzine pamoate (VISTARIL) 25 mg, Oral, Nightly    levothyroxine (SYNTHROID) 50 MCG tablet TAKE ONE TABLET ONCE DAILY    loratadine (CLARITIN) 10 mg, Oral    losartan (COZAAR) 50 mg, Oral, Daily    metoprolol succinate (TOPROL-XL) 25 mg, Oral, Daily    mycophenolate (CELLCEPT) 500 mg Tab Oral, 2 times daily    omeprazole (PRILOSEC) 20 mg, Oral, Daily    pravastatin (PRAVACHOL) 20 mg, Oral, Nightly    psyllium 0.52 g, Oral, 2 times daily    Saccharomyces boulardii (FLORASTOR) 250 mg, Oral, 2 times daily    spironolactone (ALDACTONE) 25 MG tablet TAKE ONE TABLET ONCE DAILY    sulfamethoxazole-trimethoprim 800-160mg (BACTRIM DS) 800-160 mg Tab 1 tablet, Oral, 2 times daily    zolpidem (AMBIEN) 5 MG Tab TAKE  ONE TABLET AT BEDTIME       Current Inpatient Medications   aspirin  81 mg Oral Daily    clindamycin (CLEOCIN) IVPB  600 mg Intravenous Q8H    docusate calcium  240 mg Oral Daily    famotidine  20 mg Oral BID    flecainide  100 mg Oral BID    furosemide  40 mg Oral Daily    gabapentin  300 mg Oral BID    hydrOXYzine pamoate  25 mg Oral QHS    Lactobacillus acidophilus  1 capsule Oral TID WM    levothyroxine  50 mcg Oral Before breakfast    losartan  50 mg Oral Daily    methylPREDNISolone sodium succinate injection  80 mg Intravenous Q8H    metoprolol succinate  25 mg Oral Daily    mycophenolate  1,000 mg Oral BID    pantoprazole  40 mg Oral Daily    polyethylene glycol  17 g Oral Daily    pravastatin  20 mg Oral QHS    primaquine  26.3 mg Oral Q24H    psyllium husk (with sugar)  1 packet Oral BID    spironolactone  25 mg Oral Daily    vitamin D  5,000 Units Oral Daily    warfarin  2 mg Oral Daily    zolpidem  5 mg Oral Nightly           Intake/Output Summary (Last 24 hours) at 8/1/2022 1251  Last data filed at 7/31/2022 1400  Gross per 24 hour   Intake --   Output 1500 ml   Net -1500 ml       Vital Signs (Most Recent):  Temp: 97.7 °F (36.5 °C) (08/01/22 1207)  Pulse: 64 (08/01/22 1207)  Resp: 17 (08/01/22 1207)  BP: 129/67 (08/01/22 1207)  SpO2: (!) 93 % (08/01/22 1207)  Body mass index is 32.77 kg/m².  Weight: 83.9 kg (185 lb) Vital Signs (24h Range):  Temp:  [97.3 °F (36.3 °C)-98.1 °F (36.7 °C)] 97.7 °F (36.5 °C)  Pulse:  [58-74] 64  Resp:  [16-27] 17  SpO2:  [91 %-99 %] 93 %  BP: (126-151)/(53-70) 129/67     Physical Exam  Constitutional:       General: She is not in acute distress.  HENT:      Head: Normocephalic and atraumatic.      Mouth/Throat:      Mouth: Mucous membranes are dry.      Pharynx: No oropharyngeal exudate or posterior oropharyngeal erythema.   Cardiovascular:      Rate and Rhythm: Normal rate and regular rhythm.      Pulses: Normal pulses.      Heart sounds: Normal heart  sounds.   Pulmonary:      Breath sounds: No wheezing.      Comments: B/L posterior diffuse coarse breath sounds  Musculoskeletal:      Right lower leg: No edema.      Left lower leg: No edema.   Skin:     General: Skin is warm and dry.      Capillary Refill: Capillary refill takes less than 2 seconds.   Neurological:      Mental Status: She is alert.   Psychiatric:         Mood and Affect: Mood normal.         Behavior: Behavior normal.         Thought Content: Thought content normal.         Judgment: Judgment normal.         Mechanical ventilation support:  Oxygen Concentration (%): 80 (08/01/22 0943)    Lines/Drains/Airways     Drain  Duration                Urethral Catheter 07/25/22 1000 Straight-tip 16 Fr. 7 days          Peripheral Intravenous Line  Duration                Peripheral IV - Single Lumen 07/25/22 1000 Anterior;Right Upper Arm 7 days                Significant Labs:    Lab Results   Component Value Date    WBC 25.3 (H) 08/01/2022    HGB 10.4 (L) 08/01/2022    HCT 32.1 (L) 08/01/2022    .6 (H) 08/01/2022     08/01/2022         BMP  Lab Results   Component Value Date     (L) 08/01/2022    K 4.8 08/01/2022    CL 98 02/25/2021    CO2 28 08/01/2022    BUN 45.5 (H) 08/01/2022    CREATININE 0.81 08/01/2022    CALCIUM 9.1 08/01/2022    ANIONGAP 5 (L) 02/25/2021    ESTGFRAFRICA >60.0 02/25/2021    EGFRNONAA >60 08/01/2022       ABG  No results for input(s): PH, PO2, PCO2, HCO3, BE in the last 168 hours.        Significant Imaging:  I have reviewed all pertinent imaging within the past 24 hours.        Assessment/Plan:     Assessment  1. Pulmonary fibrosis s/t  antisynthetase syndrome, on Cellcept and prednisone per rheumatology  2. Worsening bilateral infiltrates on imaging -- possible progression of ILD vs infectious vs inflammatory vs DAH  3. Acute hypoxemic respiratory failure s/t above  4. Anemia and elevated INR, improving  5. Atrial fibrillation on coumadin  6. Pulmonary HTN,  mild by RHC  7. History of DVT and factor V Leiden mutation, on coumadin  8. EWA-stable.  Increased BUN could be secondary to steroids.  9. Hyponatremia-improving      Plan  - Continue vapotherm as tolerated during days & w/ meals. BiPAP at night & if needed for episodes of desaturation s/t to poor reserve  - Pending repeat CXR (8/1/22); as previous was (7/24)  - Continue antibiotics per ID  - Continue steroids at present dose for now  - Beta-D-Glucan negative, PJP is less likely however ID will continue treatment given clinical picture.  - PJP PCR re-ordered (7/31/22)  - Long and in depth discussion with patient and daughter who is at bedside about current respiratory status (7/31/22).   - Encouraged increase protein intake via nutritional supplementation s/t increase caloric expenditure w/ poor respiratory status (8/1/22)  - Patient is DNR  - Will continue to follow       Ponce Yu MD   Internal Medicine PGY-II    Pulmonary Critical Care Medicine  [facility] - 9th Floor Med Surg

## 2022-08-01 NOTE — CONSULTS
Consults   Patient Name: Ria Holman   MRN: [id number]   Admission Date: 7/24/2022   Hospital Length of Stay: 8   Attending Provider: Reinaldo Mckay MD   Consulting Provider: Abad Pederson M.D.  Reason for Consult: Goals of Care  Primary Care Physician: Reinaldo Mckay MD     Principal Problem: Signs and symptoms of severe respiratory distress     Patient information was obtained from patient, relative(s) and primary team.      Final diagnoses:  [R06.02] Shortness of breath  [J84.9] ILD (interstitial lung disease)  [J96.01] Acute hypoxemic respiratory failure (Primary)  [R79.1] Supratherapeutic INR     Assessment/Plan:     I reviewed the patient and family's understanding of the seriousness of the illness and its expected prognosis. We discussed the patient's goals of care and treatment preferences. We discussed the difference between palliative and curative medicine. I explained the differences between home health, palliative and hospice care. I clarified current code status. I identified the surrogate decision maker or health care POA. We discussed the patient's chosen code status as listed. I answered all questions and we formulated a plan including recommendations for symptom management and how to best achieve goals of care.     I reviewed the patient's current clinical status with the nurse. I reviewed clinical documentation, labs and imaging.     Richa (Palliative NP) and I met with patient who is sitting in the recliner in her room. Patient's  and daughter Aixa at bedside. Patient's daughter is a nurse (peds specialty) who retired and lives in [place] where her  is based in the oil and gas industry.     Patient reports she is only able to sleep about 3 hours at night between taking medicine, having vital signs checked, and having labs drawn she has someone coming in her room almost every hour. Aixa states she has spoken to the primary nurse and requested the medication schedule be  "adjusted so the patient isn't awakened at 3am for an antibiotic dose. When discussing her disease progression she stated that they are still waiting for the PCR    Patient reports she is still awaiting results of the PCR. She states ID doctor discussed changes to her treatment plan including stopping one antibiotic in favor of a different antibiotic. In discussing goals of care we talked about the plan for "after hospital". We discussed home health, palliative care, and hospice. She and her daughter are very much in favor of having additional help once she goes home. Patient states she has a supplemental insurance policy which will help pay for in home care by sitters/caregivers. When asked if she or her  are veterans she replied that her  is a . We explained that they may be eligible for  benefits to assist in home care as well. We discussed the use of low doses of Morphine or Dilaudid to help improve her air hunger. Patient and daughter stated they would feel comfortable with a low dose narcotic for symptom management. Patient's daughter states that her goal for her mother is for her to be a little more independent. She would like the patient to be able to transfer from bed to chair or BSC once at home. The patient stated that her goal is that, once home, she would be able to ambulate from her bedroom to the kitchen. Discussed capabilities of O2 in home setting and that treating her symptoms as opposed to treating the O2 sat.     Daughter became tearful when discussing morphine.  Offered support and discussed with her symptom management as a role of palliative team.  She expressed that she was under the impression that palliative team would speak with patient about end of life care.  Extensive education with daughter and patient that palliative team sees patients at all phases of their disease trajectory.  Aixa states that DIL was particularly shaken and surprised by palliative visit " because she just lost her mother a few months ago after having had 3 different types of cancer. We explained the misconception that palliative and hospice are the same and answered questions. Patient was asked if her diarrhea has improved to which she replied that it's better since the fiber supplement was increased and the probiotic was added. Daughter states the patient experienced a good bit of nausea on Sunday. Patient states she has not had any nausea today. Denies pain.     Palliative will continue to follow to assist in goals of care.    Code status: DNR    Recommendations:   Continue fiber and probiotics as ordered to decrease diarrhea.    History of Present Illness:   Since Friday the patient looks much better with more color to her skin, not as pale. She is out of bed in the recliner and has less work to breathe. Pt is able to tolerate the Vapotherm for longer periods during the day and is using BiPAP when she sleeps. Awaiting PCR results of PJP testing.      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    Atrial fibrillation 07/13/2022    Deep vein thrombosis (DVT) 07/13/2022    Factor V Leiden mutation 07/13/2022    Gastroesophageal reflux disease 07/13/2022    Hyperlipidemia 07/13/2022    Malignant neoplasm of skin 07/13/2022    Temporal arteritis 07/13/2022    Hypothyroidism 07/13/2022    Tremor 07/13/2022    Vitamin D deficiency 07/13/2022     Resolved Ambulatory Problems     Diagnosis Date Noted    No Resolved Ambulatory Problems     Past Medical History:   Diagnosis Date    Cancer     Deep vein thrombosis     Diabetes mellitus     GERD (gastroesophageal reflux disease)      Hypertension         Past Surgical History:   Procedure Laterality Date    APPENDECTOMY      BIOPSY OF ADENOIDS      BIOPSY OF TEMPORAL ARTERY       SECTION      FOOT SURGERY      FUNCTIONAL ENDOSCOPIC SINUS SURGERY (FESS)      JOINT REPLACEMENT      hip    RIGHT HEART CATHETERIZATION Right 2021    Procedure: INSERTION, CATHETER, RIGHT HEART;  Surgeon: Bridgette Cheung MD;  Location: [facility] CATH LAB;  Service: Cardiology;  Laterality: Right;    THORACOSCOPY      TONSILLECTOMY          Review of patient's allergies indicates:   Allergen Reactions    Codeine Itching    Doxycycline Itching and Rash    Levofloxacin Nausea Only     Other reaction(s): Acid reflux          Current Facility-Administered Medications:     acetaminophen tablet 650 mg, 650 mg, Oral, Q8H PRN, Reinaldo Mckay MD    acetaminophen tablet 650 mg, 650 mg, Oral, Q8H PRN, Reinaldo Mckay MD    ALPRAZolam tablet 0.25 mg, 0.25 mg, Oral, TID PRN, JEFERSON Pride, 0.25 mg at 22 1318    aspirin chewable tablet 81 mg, 81 mg, Oral, Daily, Reinaldo Mckay MD, 81 mg at 22 0938    bisacodyL suppository 10 mg, 10 mg, Rectal, Once PRN, JEFERSON Murguia    clindamycin in D5W 600 mg/50 mL IVPB 600 mg, 600 mg, Intravenous, Q8H, JEFERSON Ramirez, Stopped at 22 0540    docusate calcium capsule 240 mg, 240 mg, Oral, Daily, Reinaldo Mckay MD, 240 mg at 22 0942    famotidine tablet 20 mg, 20 mg, Oral, BID, Reinaldo Mckay MD, 20 mg at 22 0939    flecainide tablet 100 mg, 100 mg, Oral, BID, Reinaldo Mckay MD, 100 mg at 22 0941    furosemide tablet 40 mg, 40 mg, Oral, Daily, Reinaldo Mckay MD, 40 mg at 22 0939    gabapentin capsule 300 mg, 300 mg, Oral, BID, Reinaldo Mckay MD, 300 mg at 22 0939    HYDROcodone-acetaminophen 5-325 mg per tablet 1 tablet, 1 tablet, Oral, Q4H PRN, Reinaldo Mckay MD, 1 tablet at 22  0737    hydrOXYzine pamoate capsule 25 mg, 25 mg, Oral, QHS, Reinaldo Mckay MD, 25 mg at 07/31/22 2226    Lactobacillus acidophilus capsule 1 capsule, 1 capsule, Oral, TID WM, Reinaldo Mckay MD, 1 capsule at 08/01/22 1150    levothyroxine tablet 50 mcg, 50 mcg, Oral, Before breakfast, Reinaldo Mckay MD, 50 mcg at 08/01/22 0509    losartan tablet 50 mg, 50 mg, Oral, Daily, Reinaldo Mckay MD, 50 mg at 08/01/22 0938    melatonin tablet 6 mg, 6 mg, Oral, Nightly PRN, Reinaldo Mckay MD    methylPREDNISolone sodium succinate injection 80 mg, 80 mg, Intravenous, Q8H, JEFERSON Pride, 80 mg at 08/01/22 0940    metoprolol succinate (TOPROL-XL) 24 hr tablet 25 mg, 25 mg, Oral, Daily, Reinaldo Mckay MD, 25 mg at 08/01/22 0939    mycophenolate capsule 1,000 mg, 1,000 mg, Oral, BID, Reinaldo Mckay MD, 1,000 mg at 08/01/22 0939    ondansetron injection 4 mg, 4 mg, Intravenous, Q6H PRN, Reinaldo Mckay MD, 4 mg at 07/31/22 1200    pantoprazole EC tablet 40 mg, 40 mg, Oral, Daily, Reinaldo Mckay MD, 40 mg at 08/01/22 0939    polyethylene glycol packet 17 g, 17 g, Oral, Daily, Reinaldo Mckay MD, 17 g at 08/01/22 0938    pravastatin tablet 20 mg, 20 mg, Oral, QHS, Reinaldo Mckay MD, 20 mg at 07/31/22 2225    primaquine tablet 26.3 mg, 26.3 mg, Oral, Q24H, JEFERSON Ramirez, 26.3 mg at 07/31/22 1718    psyllium husk (with sugar) 3.4 gram packet 1 packet, 1 packet, Oral, BID, Yifan Glaser II, MD, 1 packet at 08/01/22 0941    sodium chloride 0.9% flush 10 mL, 10 mL, Intravenous, PRN, Reinaldo Mckay MD    spironolactone tablet 25 mg, 25 mg, Oral, Daily, Reinaldo Mckay MD, 25 mg at 08/01/22 0938    vitamin D 1000 units tablet 5,000 Units, 5,000 Units, Oral, Daily, Reinaldo Mckay MD, 5,000 Units at 08/01/22 0939    warfarin (COUMADIN) tablet 2 mg, 2 mg, Oral, Daily, Reinaldo Mckay MD    zolpidem tablet 5 mg, 5 mg,  "Oral, Nightly, Reinaldo Mckay MD, 5 mg at 07/31/22 9399     acetaminophen, acetaminophen, ALPRAZolam, bisacodyL, HYDROcodone-acetaminophen, melatonin, ondansetron, sodium chloride 0.9%     Family History   Problem Relation Age of Onset    Diabetes Mellitus Mother     Heart failure Mother     Hypertension Mother     Heart attack Father     Hypertension Father     Hypertension Sister         Review of Systems   Constitutional: Positive for activity change, appetite change and fatigue.   HENT: Positive for hearing loss.    Eyes:        Wears eyeglasses.   Respiratory: Positive for shortness of breath.         Alternating between Vapotherm and BiPAP.   Cardiovascular:        Irregular heartbeat.   Gastrointestinal: Positive for abdominal distention and diarrhea.   Genitourinary:        Nelson in place.   Skin:        WNL   Neurological: Positive for weakness.   Hematological: Bruises/bleeds easily.   Psychiatric/Behavioral: Negative.             Objective:   BP (!) 151/70   Pulse 64   Temp 97.7 °F (36.5 °C) (Axillary)   Resp 18   Ht 5' 3" (1.6 m)   Wt 83.9 kg (185 lb)   SpO2 (!) 93%   BMI 32.77 kg/m²      Physical Exam  Constitutional:       Appearance: She is ill-appearing.   HENT:      Head: Normocephalic.      Nose: Nose normal.      Mouth/Throat:      Mouth: Mucous membranes are moist.   Eyes:      Pupils: Pupils are equal, round, and reactive to light.   Cardiovascular:      Rate and Rhythm: Rhythm irregular.      Pulses: Normal pulses.   Pulmonary:      Effort: Respiratory distress present.      Comments: Vapotherm in place with nasal cannula.  Rhonchi and diminished to rLL and TC  Abdominal:      Palpations: Abdomen is soft.   Musculoskeletal:      Cervical back: Normal range of motion.      Right lower leg: No edema.      Left lower leg: No edema.   Skin:     General: Skin is warm and dry.   Neurological:      Mental Status: She is alert and oriented to person, place, and time.   Psychiatric:  "        Mood and Affect: Mood normal.         Behavior: Behavior normal.            Review of Symptoms  Review of Symptoms    Symptom Assessment (ESAS 0-10 Scale)  Pain:  0  Dyspnea:  0  Anxiety:  0  Nausea:  0  Depression:  0  Anorexia:  0  Fatigue:  0  Insomnia:  0  Restlessness:  0  Agitation:  0         Performance Status:  50    Living Arrangements:  Lives with spouse and Lives in home     Time-Based Charting:  No      Advance Care Planning   Advance Directives:   Do Not Resuscitate Status: Yes      Decision Making:  Patient answered questions and Family answered questions          Caregiver burden formerly assessed: yes

## 2022-08-01 NOTE — PLAN OF CARE
Problem: Physical Therapy  Goal: Physical Therapy Goal  Description: Goals to be met by: 22     Patient will increase functional independence with mobility by performin. Supine to sit with [place]  2. Sit to supine with [place]  3. Sit to stand transfer with Supervision  4. Bed to chair transfer with Supervision using Rolling Walker  5. Gait  x 25 feet with Supervision using Rolling Walker.   6. Increased functional strength to 4/5 in BLE's  Outcome: Ongoing, Progressing

## 2022-08-01 NOTE — PT/OT/SLP EVAL
Physical Therapy Evaluation    Patient Name:  Ria Holman   MRN:  [id number]    Recommendations:     Discharge Recommendations:  rehabilitation facility, nursing facility, skilled, home health PT   Discharge Equipment Recommendations: walker, rolling (TBD)   Barriers to discharge: Decreased activity tolerance    Assessment:     Ria Holman is a 77 y.o. female admitted with a medical diagnosis of Signs and symptoms of severe respiratory distress.  Patient reports living with spouse in single level home. She is independent and occasionally ambulates with 3 wheel rollator. She presents with the following impairments/functional limitations:  weakness, impaired endurance, impaired self care skills, impaired functional mobility, gait instability, impaired balance, decreased safety awareness, impaired cardiopulmonary response to activity. She required MIN A for bed mobility. Sitting balance considered good. Transferred to chair with HHA. Patient with very poor endurance. Required extended rest breaks after minimal movement. Therapy sessions will be limited by her endurance, but I do feel that should could benefit from PT services to maintain her strength and mobility. Progress patient as tolerated.     Rehab Prognosis: Fair and Poor; patient would benefit from acute skilled PT services to address these deficits and reach maximum level of function.    Recent Surgery: * No surgery found *      Plan:     During this hospitalization, patient to be seen 5 x/week to address the identified rehab impairments via gait training, therapeutic activities, therapeutic exercises, neuromuscular re-education and progress toward the following goals:    · Plan of Care Expires:  09/01/22    Subjective     Chief Complaint: SOB  Patient/Family Comments/goals: none  Pain/Comfort:  · Pain Rating 1: 0/10    Patients cultural, spiritual, Spiritism conflicts given the current situation: no    Living Environment:  Lives with spouse in single  level home.   Prior to admission, patients level of function was independent.  Equipment used at home:  (3 wheel rollator).  DME owned (not currently used): none.  Upon discharge, patient will have assistance from TBD.    Objective:     Communicated with nurse prior to session.  Patient found HOB elevated with telemetry, oxygen  upon PT entry to room.    General Precautions: Standard, fall, respiratory   Orthopedic Precautions:N/A   Braces: N/A  Respiratory Status: Vapotherm: 40 LPM, 100%. Sats 67%-92%    Exams:  · Cognitive Exam:  Patient is oriented to Person, Place, Time and Situation  · Sensation:    · -       Intact  · RLE ROM: WFL  · RLE Strength: 3/5 grossly  · LLE ROM: WFL  · LLE Strength: 3/5 grossly    Functional Mobility:  · Bed Mobility:     · Supine to Sit: minimum assistance  · Transfers:     · Sit to Stand:  minimum assistance with hand-held assist  · Bed to Chair: minimum assistance with  hand-held assist  using  Step Transfer  · Sit Balance: good    AM-PAC 6 CLICK MOBILITY  Total Score:16     Patient left up in chair with all lines intact, call button in reach and family present.    GOALS:   Multidisciplinary Problems     Physical Therapy Goals        Problem: Physical Therapy    Goal Priority Disciplines Outcome Goal Variances Interventions   Physical Therapy Goal     PT, PT/OT Ongoing, Progressing     Description: Goals to be met by: 22     Patient will increase functional independence with mobility by performin. Supine to sit with [place]  2. Sit to supine with [place]  3. Sit to stand transfer with Supervision  4. Bed to chair transfer with Supervision using Rolling Walker  5. Gait  x 25 feet with Supervision using Rolling Walker.   6. Increased functional strength to 4/5 in BLE's                   History:     Past Medical History:   Diagnosis Date    Atrial fibrillation     Cancer     skin cancer    Deep vein thrombosis     Diabetes mellitus     GERD  (gastroesophageal reflux disease)     Hyperlipidemia     Hypertension     Hypothyroidism     Interstitial lung disease        Past Surgical History:   Procedure Laterality Date    APPENDECTOMY      BIOPSY OF ADENOIDS      BIOPSY OF TEMPORAL ARTERY       SECTION      FOOT SURGERY      FUNCTIONAL ENDOSCOPIC SINUS SURGERY (FESS)      JOINT REPLACEMENT      hip    RIGHT HEART CATHETERIZATION Right 2021    Procedure: INSERTION, CATHETER, RIGHT HEART;  Surgeon: Bridgette Cheung MD;  Location: [facility] CATH LAB;  Service: Cardiology;  Laterality: Right;    THORACOSCOPY      TONSILLECTOMY         Time Tracking:     PT Received On: 22  PT Start Time: 832     PT Stop Time: 848  PT Total Time (min): 16 min     Billable Minutes: Moderate Complexity Evaluation 16 minutes      2022

## 2022-08-01 NOTE — PROGRESS NOTES
Subjective:  Her breathing situation has remained stable.  She does desaturate with increased activity.  She remains on a combination of BiPAP for the most part and occasionally on Vapotherm.  No worsening over the weekend    She also developed diarrhea over the weekend but this improved after increasing the dose of her fiber supplement.      She would like to try to get out of bed.  She is asking about possibly consulting Physical therapy.    She has trouble using the bedpan and requests that we keep the Nelson catheter in place    Objective:  She is afebrile.  Vital signs are stable.    Heart has a regular rate and rhythm.  Lungs bilateral crackles.  Abdomen is flat and soft.  Extremities without clubbing cyanosis or edema.    Laboratory studies reveal no new culture results.  CBC shows her white count has gone up to 25,000. Chemistry profile okay except for creeping up BUN.  INR is now therapeutic at 2.2.    Assessment:  1. Respiratory failure.  Progression of her chronic interstitial lung disease plus or minus acute infection.  She is covered with antibiotics in case there is infection.  Overall she is stable and has held her own over the weekend but she does remain critically ill.    2. History of DVT and factor 5 laden mutation    3. History of hypertension and atrial fib    4. Recent Coumadin toxicity.  Corrected    5. Severe generalized debilitation    Plan:  1. Increase activity as able.  Physical therapy will be consulted    2. Same meds for now.    3. Resume low-dose Coumadin and continue to monitor INRs

## 2022-08-01 NOTE — PLAN OF CARE
Problem: Adult Inpatient Plan of Care  Goal: Plan of Care Review  Outcome: Ongoing, Progressing  Goal: Patient-Specific Goal (Individualized)  Outcome: Ongoing, Progressing  Goal: Absence of Hospital-Acquired Illness or Injury  Outcome: Ongoing, Progressing  Goal: Optimal Comfort and Wellbeing  Outcome: Ongoing, Progressing  Goal: Readiness for Transition of Care  Outcome: Ongoing, Progressing     Problem: Infection  Goal: Absence of Infection Signs and Symptoms  Outcome: Ongoing, Progressing     Problem: Skin Injury Risk Increased  Goal: Skin Health and Integrity  Outcome: Ongoing, Progressing     Problem: Skin Injury Risk Increased  Goal: Skin Health and Integrity  Outcome: Ongoing, Progressing     Problem: Coping Ineffective  Goal: Effective Coping  Outcome: Ongoing, Progressing     Problem: Fall Injury Risk  Goal: Absence of Fall and Fall-Related Injury  Outcome: Ongoing, Progressing

## 2022-08-01 NOTE — PROGRESS NOTES
Infectious Disease  Progress Note    Patient Name: Ria Holman   MRN: [id number]   Admission Date: 7/24/2022   Hospital Length of Stay: 8 days  Attending Physician: Reinaldo Mckay MD   Primary Care Provider: Reinaldo Mckay MD     Isolation Status: No active isolations       Subjective:     Principal Problem: Signs and symptoms of severe respiratory distress     Interval History:   Improving slowly, no new  Concerns today, tolerating time off BiPAP better although remains with increased O2 requirements    Review of Systems   Review of Systems   All other systems reviewed and are negative.       Objective:     Vital Signs (Most Recent):  Temp: 97.7 °F (36.5 °C) (08/01/22 0738)  Pulse: 64 (08/01/22 0738)  Resp: 18 (08/01/22 0738)  BP: (!) 151/70 (08/01/22 0939)  SpO2: (!) 93 % (08/01/22 0943)  Vital Signs (24h Range):  Temp:  [97.3 °F (36.3 °C)-98.1 °F (36.7 °C)] 97.7 °F (36.5 °C)  Pulse:  [58-74] 64  Resp:  [16-27] 18  SpO2:  [91 %-99 %] 93 %  BP: (126-151)/(53-70) 151/70      Weight:   Wt Readings from Last 1 Encounters:   07/25/22 83.9 kg (185 lb)      Body mass index is Body mass index is 32.77 kg/m².     Estimated Creatinine Clearance: Estimated Creatinine Clearance: 61.6 mL/min (based on SCr of 0.81 mg/dL).     Lines/Drains/Airways     Drain  Duration                Urethral Catheter 07/25/22 1000 Straight-tip 16 Fr. 7 days          Peripheral Intravenous Line  Duration                Peripheral IV - Single Lumen 07/25/22 1000 Anterior;Right Upper Arm 7 days                 Physical Exam  Physical Exam  Constitutional:       Appearance: She is ill-appearing.      Comments: On HHFNC with conversational dyspnea   HENT:      Head: Normocephalic and atraumatic.      Mouth/Throat:      Pharynx: No oropharyngeal exudate or posterior oropharyngeal erythema.   Eyes:      Extraocular Movements: Extraocular movements intact.      Pupils: Pupils are equal, round, and reactive to light.   Cardiovascular:       Rate and Rhythm: Normal rate and regular rhythm.      Heart sounds: No murmur heard.  Pulmonary:      Effort: No respiratory distress.      Breath sounds: Rhonchi and rales (crackles significantly improved in upper lobes) present. No wheezing.   Abdominal:      General: Bowel sounds are normal. There is no distension.      Palpations: Abdomen is soft.      Tenderness: There is no abdominal tenderness. There is no right CVA tenderness or left CVA tenderness.   Musculoskeletal:         General: No swelling or tenderness.      Cervical back: Neck supple. No rigidity or tenderness.   Lymphadenopathy:      Cervical: No cervical adenopathy.   Skin:     Findings: No lesion or rash.   Neurological:      General: No focal deficit present.      Mental Status: She is alert and oriented to person, place, and time. Mental status is at baseline.      Cranial Nerves: No cranial nerve deficit.      Motor: Weakness present.   Psychiatric:         Mood and Affect: Mood normal.         Behavior: Behavior normal.          Significant Labs:   CBC:   Recent Labs   Lab 08/01/22  0521   WBC 25.3*   HGB 10.4*   HCT 32.1*        CMP:   Recent Labs   Lab 08/01/22  0521   *   K 4.8   CO2 28   BUN 45.5*   CREATININE 0.81   CALCIUM 9.1   ALBUMIN 2.9*   BILITOT 0.8   ALKPHOS 103   AST 26   ALT 30   EGFRNONAA >60       Microbiology Results (last 7 days)     Procedure Component Value Units Date/Time    Respiratory Culture [[id number]]     Order Status: Sent Specimen: Sputum     Blood Culture #1 **CANNOT BE ORDERED STAT** [[id number]]  (Normal) Collected: 07/24/22 2017    Order Status: Completed Specimen: Blood Updated: 07/29/22 2102     CULTURE, BLOOD ([facility]) No Growth at 5 days    Blood Culture #2 **CANNOT BE ORDERED STAT** [[id number]]  (Normal) Collected: 07/24/22 2017    Order Status: Completed Specimen: Blood Updated: 07/29/22 2102     CULTURE, BLOOD ([facility]) No Growth at 5 days           Significant Imaging: I have reviewed all pertinent  imaging results/findings within the past 24 hours.    Assessment/Plan:      She is a 77-year-old female with a past medical history of ILD on chronic CellCept and steroids who recently developed dyspnea on exertion as well as hypoxia.  Imaging concerning for atypical process.  Id consult for input.     1. Worsening bilateral pulmonary infiltrates, concerning for atypical infection such as PJP vs progression of ILD vs other  2. 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 Acute hypoxemic respiratory failure  4. Atrial fibrillation / history of DVT / factor V Leiden mutation on Coumadin  5. EWA    -Beta-D-Glucan negative  -The negative Beta-D-Glucan decreases the likelihood of of this being active PJP however given clinical picture and elevated LDH, would prefer to continue with the current therapy  -CXR significantly improved     PLAN:  -PJP PCR pending, added Aspergillus Ag, however this has not been collected  -Continue Primaquine and Clindamycin for concern for severe PJP, continue Steroids dosing as recommended by pulmonary   -Some loose stools  -If patient continues improving and stabilizes, may be randi to switch her course to Pills vs Atovaquone to complete the 21 day course  -Discussed with patient, , sister in law and grand-daughter at bedside    ID will continue following. Please contact us with any questions or concerns.    Ronnie Sherman MD  Infectious Disease  [facility]

## 2022-08-02 NOTE — PROGRESS NOTES
Subjective:  The patient had a good night.  She also sat in the chair for about 10 hours yesterday.  She is complaining of nasal congestion and hears a pop.  Her hearing seems muffled.    Objective:  She is afebrile.  Vital signs stable.    General appearance she is in no distress.  Heart has a regular rate and rhythm.  Telemetry reveals sinus  Rhythm with first-degree AV block.  Lungs clear anteriorly.  Abdomen nontender.  Extremities without clubbing cyanosis or edema.      Laboratory studies stable.  Platelet count down slightly.  INR therapeutic.  White count improved slightly      Chest x-ray yesterday shows marked improvement in diffuse infiltrates    Assessment:  1. Respiratory failure.  Improving    2. Hypertension and atrial fib.  Blood pressure okay and she remains in sinus rhythm    3. Recent Coumadin toxicity.  Corrected and now with therapeutic  Anticoagulation     4. Generalized debilitation secondary to acute illness     5. Eustachian tube dysfunction secondary to her current condition and treatment there of     Plan: Continue same meds.  Add Afrin nasal spray daily.  Continue physical therapy.

## 2022-08-02 NOTE — PLAN OF CARE
08/02/22 1530   Discharge Assessment   Assessment Type Discharge Planning Assessment   Confirmed/corrected address, phone number and insurance Yes   Confirmed Demographics Correct on Facesheet   Source of Information family   Communicated GILBERT with patient/caregiver Date not available/Unable to determine   Reason For Admission SOB   Lives With spouse   Do you expect to return to your current living situation? Yes   Do you have help at home or someone to help you manage your care at home? Yes   Who are your caregiver(s) and their phone number(s)? Spouse Dewayne Holman [phone], Daughter Aixa Pratt [phone], Son Mulugeta Holman [phone]   Prior to hospitilization cognitive status: Alert/Oriented   Current cognitive status: Alert/Oriented   Walking or Climbing Stairs Difficulty none   Dressing/Bathing Difficulty none   Equipment Currently Used at Home oxygen;walker, millicent   Readmission within 30 days? No   Patient currently being followed by outpatient case management? No   Do you currently have service(s) that help you manage your care at home? No   Do you take prescription medications? Yes   Do you have prescription coverage? Yes   Do you have any problems affording any of your prescribed medications? No   Is the patient taking medications as prescribed? yes   Who is going to help you get home at discharge? Spouse Dewayne Holman [phone], Daughter Aixa Pratt [phone], Son Mulugeta Holman [phone]   How do you get to doctors appointments? family or friend will provide   Are you on dialysis? No   Do you take coumadin? Yes   Who monitors your labs? Dr Mckay   Discharge Plan A Home with family   Discharge Plan B Home with family   DME Needed Upon Discharge  none   Discharge Plan discussed with: Spouse/sig other;Adult children   Name(s) and Number(s) Spouse Dewayne Holman [phone], Daughter Aixa Pratt [phone], Son Mulugeta Holman [phone]   Discharge Barriers Identified None    Relationship/Environment   Name(s) of Who Lives With Patient Spouse Dewayne Holman [phone], Daughter Aixa Pratt [phone]

## 2022-08-02 NOTE — PT/OT/SLP PROGRESS
Physical Therapy Treatment    Patient Name:  Ria Holman   MRN:  [id number]    Recommendations:     Discharge Recommendations:  nursing facility, skilled, rehabilitation facility, home health PT   Discharge Equipment Recommendations: walker, rolling (TBD)   Barriers to discharge: Medical status    Assessment:     Ria Holman is a 77 y.o. female admitted with a medical diagnosis of Signs and symptoms of severe respiratory distress.  She presents with the following impairments/functional limitations:  weakness, impaired endurance, impaired functional mobility, gait instability, impaired balance, decreased safety awareness, impaired cardiopulmonary response to activity. Patient continues to be limited by SOB. Desats to 60s-70s with minimal movement. Will continue to progress patient as tolerated.     Rehab Prognosis: Fair and Poor; patient would benefit from acute skilled PT services to address these deficits and reach maximum level of function.    Recent Surgery: * No surgery found *      Plan:     During this hospitalization, patient to be seen 5 x/week to address the identified rehab impairments via gait training, therapeutic activities, therapeutic exercises, neuromuscular re-education and progress toward the following goals:    · Plan of Care Expires:  09/01/22    Subjective     Chief Complaint: SOB  Patient/Family Comments/goals: none  Pain/Comfort:  · Pain Rating 1: 0/10      Objective:     Communicated with nurse prior to session.  Patient found HOB elevated with telemetry, oxygen upon PT entry to room.     General Precautions: Standard, fall, respiratory   Orthopedic Precautions:N/A   Braces: N/A  Respiratory Status: Vapotherm. 40L/min. 100%. Sats 67%-88% during session     Functional Mobility:  · Bed Mobility:     · Supine to Sit: minimum assistance  · Transfers:     · Sit to Stand:  minimum assistance with hand-held assist  · Bed to Chair: minimum assistance with  hand-held assist  using  Step  Transfer  · Balance: Fair/poor      AM-PAC 6 CLICK MOBILITY  Turning over in bed (including adjusting bedclothes, sheets and blankets)?: 3  Sitting down on and standing up from a chair with arms (e.g., wheelchair, bedside commode, etc.): 3  Moving from lying on back to sitting on the side of the bed?: 3  Moving to and from a bed to a chair (including a wheelchair)?: 3  Need to walk in hospital room?: 3  Climbing 3-5 steps with a railing?: 1  Basic Mobility Total Score: 16       Therapeutic Activities:   1)Bed mobility: Required MIN A. Requires increase in time 2/2 poor endurance.   2)Sitting: focus on upright posture to improve trunk strength. Required rest breaks in which she had to lean against daughter for support.   3)Sit<>stand: MIN A for sit<>stand.  4)Transfer: Bed->chair with MIN A via step transfer. Unsteady    **Satting at 84% at beginning of session. She required multiple extended rest breaks throughout session as she fatigues extremely fast. Sats got down to 64% after transferring to chair.  Slowly recovered to mid 80s with rest.     Patient left up in chair with all lines intact, call button in reach and daughter present..    GOALS:   Multidisciplinary Problems     Physical Therapy Goals        Problem: Physical Therapy    Goal Priority Disciplines Outcome Goal Variances Interventions   Physical Therapy Goal     PT, PT/OT Ongoing, Progressing     Description: Goals to be met by: 22     Patient will increase functional independence with mobility by performin. Supine to sit with [place]  2. Sit to supine with [place]  3. Sit to stand transfer with Supervision  4. Bed to chair transfer with Supervision using Rolling Walker  5. Gait  x 25 feet with Supervision using Rolling Walker.   6. Increased functional strength to 4/5 in BLE's                   Time Tracking:     PT Received On: 22  PT Start Time: 748     PT Stop Time: 812  PT Total Time (min): 24 min     Billable Minutes:  Therapeutic Activity 24 minutes    Treatment Type: Treatment  PT/PTA: PT     PTA Visit Number: 1     08/02/2022

## 2022-08-02 NOTE — PROGRESS NOTES
[facility]  Pulmonary Critical Care Note    Patient Name: Ria Holman  MRN: [id number]  Admission Date: 7/24/2022  Hospital Length of Stay: 9 days  Code Status: DNR  Attending Provider: Reinaldo Mckay MD  Primary Care Provider: Reinaldo Mckay MD     Subjective:     HPI:   This is a 77-year-old female patient of Dr. 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 She also has a history of DVT in her right lower extremity and factor V Leiden mutation as well as atrial fibrillation, on coumadin. Most recently had been on Cellcept and prednisone by Dr Branch. She had been experiencing increasing dyspnea and cough and was seen in our clinic last week. O2 levels were low and she was initiated on oxygen, started on bactrim and azithromyin and increased her prednisone. A CT scan was obtained at outside hospital which revealed no PE but showed patchy areas of groundglass increasing compared to April 2022 per report. She had low oxygen levels that persisted at home despite nasal cannula and she was admitted on 7/24/22. Workup revealed anemia, elevated INR, and hypoxemia. Her respiratory culture from last week is with normal respiratory katie and COVID and flu swabs are negative.     24 Hour Interval History:  NAEON. She continues to alternate vapotherm/bipap between days/nights respectively. Patient reports feeling better but continues difficulty with deep inspiration. Currently on vapotherm at 100% FIO2, 40L.  Patient was able to ambulate to nearby chair however saturation dropped to low 70s and required approximately 10 minutes for recovery to low 90s; consistent with pulmonary reserve yesterday (08/01/22) Remains on antibiotics and steroids; with plan of weaning steroids today. Beta-D-Glucan negative, pending PJP PCR.     Past Medical History:   Diagnosis Date    Atrial fibrillation     Cancer     skin cancer    Deep vein  thrombosis     Diabetes mellitus     GERD (gastroesophageal reflux disease)     Hyperlipidemia     Hypertension     Hypothyroidism     Interstitial lung disease        Past Surgical History:   Procedure Laterality Date    APPENDECTOMY      BIOPSY OF ADENOIDS      BIOPSY OF TEMPORAL ARTERY       SECTION      FOOT SURGERY      FUNCTIONAL ENDOSCOPIC SINUS SURGERY (FESS)      JOINT REPLACEMENT      hip    RIGHT HEART CATHETERIZATION Right 2021    Procedure: INSERTION, CATHETER, RIGHT HEART;  Surgeon: Bridgette Cheung MD;  Location: [facility] CATH LAB;  Service: Cardiology;  Laterality: Right;    THORACOSCOPY      TONSILLECTOMY         Social History     Socioeconomic History    Marital status:    Tobacco Use    Smoking status: Never Smoker    Smokeless tobacco: Never Used   Substance and Sexual Activity    Alcohol use: Never    Drug use: Never           Objective:     Current Outpatient Medications   Medication Instructions    cholecalciferol, vitamin D3, 125 mcg (5,000 unit) Tab No dose, route, or frequency recorded.    DOCUSATE CALCIUM ORAL No dose, route, or frequency recorded.    flecainide (TAMBOCOR) 100 mg, Oral, 2 times daily    gabapentin (NEURONTIN) 300 MG capsule TAKE ONE CAPSULE TWICE DAILY    gabapentin 300 mg, Oral, Nightly    hydrOXYzine pamoate (VISTARIL) 25 mg, Oral, Nightly    levothyroxine (SYNTHROID) 50 MCG tablet TAKE ONE TABLET ONCE DAILY    loratadine (CLARITIN) 10 mg, Oral    losartan (COZAAR) 50 mg, Oral, Daily    metoprolol succinate (TOPROL-XL) 25 mg, Oral, Daily    mycophenolate (CELLCEPT) 500 mg Tab Oral, 2 times daily    omeprazole (PRILOSEC) 20 mg, Oral, Daily    pravastatin (PRAVACHOL) 20 mg, Oral, Nightly    psyllium 0.52 g, Oral, 2 times daily    Saccharomyces boulardii (FLORASTOR) 250 mg, Oral, 2 times daily    spironolactone (ALDACTONE) 25 MG tablet TAKE ONE TABLET ONCE DAILY    sulfamethoxazole-trimethoprim 800-160mg (BACTRIM DS)  800-160 mg Tab 1 tablet, Oral, 2 times daily    zolpidem (AMBIEN) 5 MG Tab TAKE ONE TABLET AT BEDTIME       Current Inpatient Medications   aspirin  81 mg Oral Daily    clindamycin (CLEOCIN) IVPB  600 mg Intravenous Q8H    docusate calcium  240 mg Oral Daily    famotidine  20 mg Oral BID    flecainide  100 mg Oral BID    furosemide  40 mg Oral Daily    gabapentin  300 mg Oral BID    hydrOXYzine pamoate  25 mg Oral QHS    Lactobacillus acidophilus  1 capsule Oral TID WM    levothyroxine  50 mcg Oral Before breakfast    losartan  50 mg Oral Daily    methylPREDNISolone sodium succinate injection  80 mg Intravenous Q8H    metoprolol succinate  25 mg Oral Daily    mycophenolate  1,000 mg Oral BID    pantoprazole  40 mg Oral Daily    polyethylene glycol  17 g Oral Daily    pravastatin  20 mg Oral QHS    primaquine  26.3 mg Oral Q24H    psyllium husk (with sugar)  1 packet Oral BID    spironolactone  25 mg Oral Daily    vitamin D  5,000 Units Oral Daily    warfarin  2 mg Oral Daily    zolpidem  5 mg Oral Nightly           Intake/Output Summary (Last 24 hours) at 8/2/2022 0747  Last data filed at 8/2/2022 0500  Gross per 24 hour   Intake 1320 ml   Output 1400 ml   Net -80 ml       Vital Signs (Most Recent):  Temp: 97.8 °F (36.6 °C) (08/02/22 0725)  Pulse: 68 (08/02/22 0725)  Resp: 16 (08/02/22 0725)  BP: (!) 150/67 (08/02/22 0725)  SpO2: 96 % (08/02/22 0725)  Body mass index is 32.77 kg/m².  Weight: 83.9 kg (185 lb) Vital Signs (24h Range):  Temp:  [97.5 °F (36.4 °C)-98.2 °F (36.8 °C)] 97.8 °F (36.6 °C)  Pulse:  [56-78] 68  Resp:  [16-24] 16  SpO2:  [86 %-99 %] 96 %  BP: (111-151)/(55-70) 150/67     Physical Exam  Constitutional:       General: She is not in acute distress.  HENT:      Head: Normocephalic and atraumatic.      Mouth/Throat:      Mouth: Mucous membranes are dry.      Pharynx: No oropharyngeal exudate or posterior oropharyngeal erythema.   Cardiovascular:      Rate and Rhythm: Normal  rate and regular rhythm.      Pulses: Normal pulses.      Heart sounds: Normal heart sounds.   Pulmonary:      Breath sounds: No wheezing.      Comments: B/L posterior diffuse coarse breath sounds in middle/lower lung fields  Musculoskeletal:      Right lower leg: No edema.      Left lower leg: No edema.   Skin:     General: Skin is warm and dry.      Capillary Refill: Capillary refill takes less than 2 seconds.   Neurological:      Mental Status: She is alert.   Psychiatric:         Mood and Affect: Mood normal.         Behavior: Behavior normal.         Thought Content: Thought content normal.         Judgment: Judgment normal.         Mechanical ventilation support:  Oxygen Concentration (%): 100 (08/02/22 0030)    Lines/Drains/Airways     Drain  Duration                Urethral Catheter 07/25/22 1000 Straight-tip 16 Fr. 7 days          Peripheral Intravenous Line  Duration                Peripheral IV - Single Lumen 07/25/22 1000 Anterior;Right Upper Arm 7 days                Significant Labs:    Lab Results   Component Value Date    WBC 24.0 (H) 08/02/2022    HGB 9.6 (L) 08/02/2022    HCT 31.0 (L) 08/02/2022    .5 (H) 08/02/2022     (L) 08/02/2022         BMP  Lab Results   Component Value Date     08/02/2022    K 5.1 08/02/2022    CL 98 02/25/2021    CO2 30 08/02/2022    BUN 50.9 (H) 08/02/2022    CREATININE 0.83 08/02/2022    CALCIUM 8.8 08/02/2022    ANIONGAP 5 (L) 02/25/2021    ESTGFRAFRICA >60.0 02/25/2021    EGFRNONAA >60 08/02/2022       ABG  No results for input(s): PH, PO2, PCO2, HCO3, BE in the last 168 hours.        Significant Imaging:  I have reviewed all pertinent imaging within the past 24 hours.        Assessment/Plan:     Assessment  1. Pulmonary fibrosis s/t  antisynthetase syndrome, on Cellcept and prednisone per rheumatology  2. Worsening bilateral infiltrates on imaging -- possible progression of ILD vs infectious vs inflammatory vs DAH  3. Acute hypoxemic respiratory  failure s/t above  4. Anemia and elevated INR, improved  5. Atrial fibrillation on coumadin  6. Pulmonary HTN, mild by RHC  7. History of DVT and factor V Leiden mutation, on coumadin  8. EWA-improved.  Increased BUN could be secondary to steroids.  9. Hyponatremia-resolved      Plan  - Continue vapotherm as tolerated during days & w/ meals. BiPAP at night & if needed for episodes of desaturation s/t to poor reserve  - Repeat CXR (8/1/22): Considerable interval improvement of B/L lung aeration w/ nearly complete resolution of congestive opacities.  - With imaging improvement; will reduce IV Solumedrol to 40mg q8h  - Continue antibiotics per ID  - Beta-D-Glucan negative, PJP PCR pending; currently covered for PJP w/ Bactrim per ID  - Continued to encourage increase protein intake via nutritional supplementation s/t increase caloric expenditure w/ poor respiratory status  - Patient is DNR  - Will continue to follow       Ponce Yu MD   Internal Medicine PGY-II    Pulmonary Critical Care Medicine  [facility] - 9[facility] Med Surg    Attending Addendum to Follow

## 2022-08-03 NOTE — CONSULTS
"Nutrition   Progress Note      Recommendations:  1. Continue Regular diet as tolerated  2. Add Ashok BID to assist with wound healing  3. RD to monitor po intake and weight changes      Reason for Evaluation:  Length of Stay and RD follow up    Diagnosis:    1. Acute hypoxemic respiratory failure    2. Shortness of breath    3. ILD (interstitial lung disease)    4. Supratherapeutic INR    5. Signs and symptoms of severe respiratory distress        Relevant Medical History:    Past Medical History:   Diagnosis Date    Atrial fibrillation     Cancer     skin cancer    Deep vein thrombosis     Diabetes mellitus     GERD (gastroesophageal reflux disease)     Hyperlipidemia     Hypertension     Hypothyroidism     Interstitial lung disease          Nutrition Diet History:    Factors affecting nutritional intake: decreased appetite    Food / Christian / Culture Preferences:  n/a      Nutrition Prescription Ordered:    Current Diet Order: Regular    Appetite:  Fair (50% - 75% po intake)    PO intake: 50 - 75 %      Labs / Medications / Procedures:    Nutrition Related Medications: Clindamycin, Docusate, Flecainide, Furosemide, Harriet-q, solumedrol, nystatin, vitamin D, Coumadin    Nutrition Related Labs:  7/30: WBC-17, RBC-2.87, H/H-9.5/30.1, Na-135, BUN-41.3, glu-139  8/3: H/H-10.0/32.5, na-135, Bun-47.5, Gluc-148      Anthropometrics:  Height: 5' 3" (1.6 m)  Admit Weight:  Weight: 83.9 kg (185 lb)  Latest Weight:  83.9 kg (185 lb)    Wt Readings from Last 5 Encounters:   07/25/22 83.9 kg (185 lb)   07/18/22 87.1 kg (192 lb)   07/13/22 84.4 kg (186 lb)   12/28/21 85.4 kg (188 lb 4.4 oz)   02/25/21 81.1 kg (178 lb 10.9 oz)     IBW: 52.3 kg  %IBW: 160.4%  UBW: 83.9 kg  %Weight Change: 0%  Body mass index is 32.77 kg/m².  BMI classification:  Obese Grade I (BMI 30 - 34.9)      Nutrition Narrative:  7/30: pt reports fair appetite, eating 50% of meals since admission, stating decreased appetite past month. Pt is " receiving boost, however does not like them and is requesting to d/c order. Pt getting smoothies from outside, as well as 50% po intake of meals. Pt complained of occasional nausea and loose stools. UBW reported 83.9 kg (185 lb) with no weight loss prior to admission. Weight not taken at this time; pt on lasix, so likely fluid weight. Will continue to monitor weight.  8/3: Pt continues with fair intake. Does not like protein shakes. Will add Ashok to assist with Stage 1 sacral wound    Monitoring and Evaluation:    Nutrition Monitoring and Evaluation:  food and beverage intake and weight change    Nutrition Risk:  Level of Nutrition Risk:  Low  Frequency of Follow up:  Dietitian will f/up within 7 days.

## 2022-08-03 NOTE — PROGRESS NOTES
[facility] - 9th Floor Med Surg  Pulmonary Critical Care Note    Patient Name: Ria Holman  MRN: [id number]  Admission Date: 7/24/2022  Hospital Length of Stay: 10 days  Code Status: DNR  Attending Provider: Reinaldo Mckay MD  Primary Care Provider: Reinaldo Mckay MD     Subjective:     HPI:   This is a 77-year-old female patient of Dr. 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 She also has a history of DVT in her right lower extremity and factor V Leiden mutation as well as atrial fibrillation, on coumadin. Most recently had been on Cellcept and prednisone by Dr Branch. She had been experiencing increasing dyspnea and cough and was seen in our clinic last week. O2 levels were low and she was initiated on oxygen, started on bactrim and azithromyin and increased her prednisone. A CT scan was obtained at outside hospital which revealed no PE but showed patchy areas of groundglass increasing compared to April 2022 per report. She had low oxygen levels that persisted at home despite nasal cannula and she was admitted on 7/24/22. Workup revealed anemia, elevated INR, and hypoxemia. Her respiratory culture from last week is with normal respiratory katie and COVID and flu swabs are negative.     24 Hour Interval History:  This AM, prior to and during examination; patient was seated at bedside on max vapotherm trying to utilize incentive spirometry when her saturations fell to lower 70's. She became tachypneic w/ RR in 40's and maintaining sats in mid 70's. Myself and nursing staff assisted Ms. Holman back into bed where sats improved to 88-89% within approximately x2 minutes. She denied any LH/DZ or chest pain during this episode but did feel weak. After saturations improved; RR decreased to 20 resp/min & was no longer in distress.     Currently on vapotherm at 100% FIO2, 40L. Remains on antibiotics and steroids (reduced to 40mg IV T.I.D).  Beta-D-Glucan negative, pending PJP PCR.     Past Medical History:   Diagnosis Date    Atrial fibrillation     Cancer     skin cancer    Deep vein thrombosis     Diabetes mellitus     GERD (gastroesophageal reflux disease)     Hyperlipidemia     Hypertension     Hypothyroidism     Interstitial lung disease        Past Surgical History:   Procedure Laterality Date    APPENDECTOMY      BIOPSY OF ADENOIDS      BIOPSY OF TEMPORAL ARTERY       SECTION      FOOT SURGERY      FUNCTIONAL ENDOSCOPIC SINUS SURGERY (FESS)      JOINT REPLACEMENT      hip    RIGHT HEART CATHETERIZATION Right 2021    Procedure: INSERTION, CATHETER, RIGHT HEART;  Surgeon: Bridgette Cheung MD;  Location: [facility] CATH LAB;  Service: Cardiology;  Laterality: Right;    THORACOSCOPY      TONSILLECTOMY         Social History     Socioeconomic History    Marital status:    Tobacco Use    Smoking status: Never Smoker    Smokeless tobacco: Never Used   Substance and Sexual Activity    Alcohol use: Never    Drug use: Never           Objective:     Current Outpatient Medications   Medication Instructions    cholecalciferol, vitamin D3, 125 mcg (5,000 unit) Tab No dose, route, or frequency recorded.    DOCUSATE CALCIUM ORAL No dose, route, or frequency recorded.    flecainide (TAMBOCOR) 100 mg, Oral, 2 times daily    gabapentin (NEURONTIN) 300 MG capsule TAKE ONE CAPSULE TWICE DAILY    gabapentin 300 mg, Oral, Nightly    hydrOXYzine pamoate (VISTARIL) 25 mg, Oral, Nightly    levothyroxine (SYNTHROID) 50 MCG tablet TAKE ONE TABLET ONCE DAILY    loratadine (CLARITIN) 10 mg, Oral    losartan (COZAAR) 50 mg, Oral, Daily    metoprolol succinate (TOPROL-XL) 25 mg, Oral, Daily    mycophenolate (CELLCEPT) 500 mg Tab Oral, 2 times daily    omeprazole (PRILOSEC) 20 mg, Oral, Daily    pravastatin (PRAVACHOL) 20 mg, Oral, Nightly    psyllium 0.52 g, Oral, 2 times daily    Saccharomyces boulardii (FLORASTOR) 250 mg,  Oral, 2 times daily    spironolactone (ALDACTONE) 25 MG tablet TAKE ONE TABLET ONCE DAILY    zolpidem (AMBIEN) 5 MG Tab TAKE ONE TABLET AT BEDTIME       Current Inpatient Medications   aspirin  81 mg Oral Daily    clindamycin (CLEOCIN) IVPB  600 mg Intravenous Q8H    docusate calcium  240 mg Oral Daily    famotidine  20 mg Oral BID    flecainide  100 mg Oral BID    furosemide  40 mg Oral Daily    gabapentin  300 mg Oral BID    hydrOXYzine pamoate  25 mg Oral QHS    Lactobacillus acidophilus  1 capsule Oral TID WM    levothyroxine  50 mcg Oral Before breakfast    losartan  50 mg Oral Daily    methylPREDNISolone sodium succinate injection  40 mg Intravenous Q8H    metoprolol succinate  25 mg Oral Daily    mycophenolate  1,000 mg Oral BID    nystatin  500,000 Units Mouth/Throat QID (WM & HS)    oxymetazoline  2 spray Each Nostril Daily    pantoprazole  40 mg Oral Daily    polyethylene glycol  17 g Oral Daily    pravastatin  20 mg Oral QHS    primaquine  26.3 mg Oral Q24H    psyllium husk (with sugar)  1 packet Oral BID    spironolactone  25 mg Oral Daily    vitamin D  5,000 Units Oral Daily    warfarin  2 mg Oral Daily    zolpidem  5 mg Oral Nightly           Intake/Output Summary (Last 24 hours) at 8/3/2022 0751  Last data filed at 8/3/2022 0200  Gross per 24 hour   Intake 240 ml   Output 2100 ml   Net -1860 ml       Vital Signs (Most Recent):  Temp: 98.3 °F (36.8 °C) (08/03/22 0707)  Pulse: 66 (08/03/22 0707)  Resp: (!) 24 (08/03/22 0256)  BP: (!) 150/65 (08/03/22 0707)  SpO2: (!) 91 % (08/03/22 0707)  Body mass index is 32.77 kg/m².  Weight: 83.9 kg (185 lb) Vital Signs (24h Range):  Temp:  [97.4 °F (36.3 °C)-98.3 °F (36.8 °C)] 98.3 °F (36.8 °C)  Pulse:  [65-74] 66  Resp:  [16-28] 24  SpO2:  [81 %-97 %] 91 %  BP: (128-169)/(51-70) 150/65     Physical Exam  Constitutional:       General: She is not in acute distress.  HENT:      Head: Normocephalic and atraumatic.      Mouth/Throat:       Mouth: Mucous membranes are dry.      Pharynx: No oropharyngeal exudate or posterior oropharyngeal erythema.   Cardiovascular:      Rate and Rhythm: Normal rate and regular rhythm.      Pulses: Normal pulses.      Heart sounds: Normal heart sounds.   Pulmonary:      Breath sounds: No wheezing.      Comments: B/L posterior diffuse coarse breath sounds in middle/lower lung fields  Musculoskeletal:      Right lower leg: No edema.      Left lower leg: No edema.   Skin:     General: Skin is warm and dry.      Capillary Refill: Capillary refill takes less than 2 seconds.   Neurological:      Mental Status: She is alert.   Psychiatric:         Mood and Affect: Mood normal.         Behavior: Behavior normal.         Thought Content: Thought content normal.         Judgment: Judgment normal.         Mechanical ventilation support:  Oxygen Concentration (%): 100 (found on, tolerating well) (08/03/22 0459)    Lines/Drains/Airways     Drain  Duration                Urethral Catheter 07/25/22 1000 Straight-tip 16 Fr. 8 days          Peripheral Intravenous Line  Duration                Peripheral IV - Single Lumen 07/25/22 1000 Anterior;Right Upper Arm 8 days                Significant Labs:    Lab Results   Component Value Date    WBC 26.9 (H) 08/03/2022    HGB 10.0 (L) 08/03/2022    HCT 32.5 (L) 08/03/2022    .6 (H) 08/03/2022     (L) 08/03/2022         BMP  Lab Results   Component Value Date     (L) 08/03/2022    K 4.5 08/03/2022    CL 98 02/25/2021    CO2 32 (H) 08/03/2022    BUN 47.5 (H) 08/03/2022    CREATININE 0.81 08/03/2022    CALCIUM 8.7 08/03/2022    ANIONGAP 5 (L) 02/25/2021    ESTGFRAFRICA >60.0 02/25/2021    EGFRNONAA >60 08/03/2022       ABG  No results for input(s): PH, PO2, PCO2, HCO3, BE in the last 168 hours.        Significant Imaging:  I have reviewed all pertinent imaging within the past 24 hours.        Assessment/Plan:     Assessment  1. Pulmonary fibrosis s/t  antisynthetase syndrome,  on Cellcept and prednisone per rheumatology  2. Worsening bilateral infiltrates on imaging -- possible progression of ILD vs infectious vs inflammatory vs DAH  3. Acute hypoxemic respiratory failure s/t above  4. Anemia and elevated INR, improved  5. Atrial fibrillation on coumadin  6. Pulmonary HTN, mild by RHC  7. History of DVT and factor V Leiden mutation, on coumadin  8. EWA-improved.  Increased BUN could be secondary to steroids.  9. Hyponatremia-resolved      Plan  - Continue vapotherm as tolerated during days & w/ meals. BiPAP at night & if needed for episodes of desaturation s/t to poor reserve  - Repeat CXR (8/1/22): Considerable interval improvement of B/L lung aeration w/ nearly complete resolution of congestive opacities.  - Continue IV Solumedrol 40mg q8h  - Continue antibiotics per ID  - Beta-D-Glucan negative, PJP PCR pending; currently covered for PJP w/ Bactrim per ID  - Continued to encourage increase protein intake via nutritional supplementation s/t increase caloric expenditure w/ poor respiratory status  - Patient is DNR  - After above mentioned episode this AM; pulmonology attending presented shortly thereafter and discussed at great length goals of care regarding LTAC vs Hospice Care (home vs house) in which patient participated in conversation and appeared to be very understanding of current overall condition.  - Will continue to follow       Ponce Yu MD   Internal Medicine PGY-II    Pulmonary Critical Care Medicine  [facility] - 9th Floor Med Surg    Attending Addendum to Follow

## 2022-08-03 NOTE — PROGRESS NOTES
Infectious Disease  Progress Note    Patient Name: Ria Holman   MRN: [id number]   Admission Date: 7/24/2022   Hospital Length of Stay: 10 days  Attending Physician: Reinaldo Mckay MD   Primary Care Provider: Reinaldo Mckay MD     Isolation Status: No active isolations       Subjective:     Principal Problem: Signs and symptoms of severe respiratory distress     Interval History:   Improving slowly, no new  Concerns today, tolerating time off BiPAP better although remains with increased O2 requirements    Review of Systems   Review of Systems   All other systems reviewed and are negative.       Objective:     Vital Signs (Most Recent):  Temp: 98.2 °F (36.8 °C) (08/03/22 1459)  Pulse: 77 (08/03/22 1720)  Resp: (!) 33 (08/03/22 1720)  BP: (!) 154/65 (08/03/22 1459)  SpO2: (!) 89 % (08/03/22 1720)  Vital Signs (24h Range):  Temp:  [97.6 °F (36.4 °C)-98.3 °F (36.8 °C)] 98.2 °F (36.8 °C)  Pulse:  [66-82] 77  Resp:  [12-33] 33  SpO2:  [81 %-95 %] 89 %  BP: (128-169)/(51-70) 154/65      Weight:   Wt Readings from Last 1 Encounters:   07/25/22 83.9 kg (185 lb)      Body mass index is Body mass index is 32.77 kg/m².     Estimated Creatinine Clearance: Estimated Creatinine Clearance: 61.6 mL/min (based on SCr of 0.81 mg/dL).     Lines/Drains/Airways     Drain  Duration                Urethral Catheter 07/25/22 1000 Straight-tip 16 Fr. 9 days          Peripheral Intravenous Line  Duration                Peripheral IV - Single Lumen 07/25/22 1000 Anterior;Right Upper Arm 9 days                 Physical Exam  Physical Exam  Constitutional:       Appearance: She is ill-appearing.      Comments: On HHFNC with conversational dyspnea   HENT:      Head: Normocephalic and atraumatic.      Mouth/Throat:      Pharynx: No oropharyngeal exudate or posterior oropharyngeal erythema.   Eyes:      Extraocular Movements: Extraocular movements intact.      Pupils: Pupils are equal, round, and reactive to light.   Cardiovascular:       Rate and Rhythm: Normal rate and regular rhythm.      Heart sounds: No murmur heard.  Pulmonary:      Effort: No respiratory distress.      Breath sounds: Rhonchi and rales (crackles significantly improved in upper lobes) present. No wheezing.   Abdominal:      General: Bowel sounds are normal. There is no distension.      Palpations: Abdomen is soft.      Tenderness: There is no abdominal tenderness. There is no right CVA tenderness or left CVA tenderness.   Musculoskeletal:         General: No swelling or tenderness.      Cervical back: Neck supple. No rigidity or tenderness.   Lymphadenopathy:      Cervical: No cervical adenopathy.   Skin:     Findings: No lesion or rash.   Neurological:      General: No focal deficit present.      Mental Status: She is alert and oriented to person, place, and time. Mental status is at baseline.      Cranial Nerves: No cranial nerve deficit.      Motor: Weakness present.   Psychiatric:         Mood and Affect: Mood normal.         Behavior: Behavior normal.          Significant Labs:   CBC:   Recent Labs   Lab 08/02/22  0409 08/03/22  0414   WBC 24.0* 26.9*   HGB 9.6* 10.0*   HCT 31.0* 32.5*   * 110*     CMP:   Recent Labs   Lab 08/02/22  0409 08/03/22  0414    135*   K 5.1 4.5   CO2 30 32*   BUN 50.9* 47.5*   CREATININE 0.83 0.81   CALCIUM 8.8 8.7   EGFRNONAA >60 >60       Microbiology Results (last 7 days)     Procedure Component Value Units Date/Time    Respiratory Culture [[id number]] Collected: 08/02/22 2330    Order Status: Resulted Specimen: Sputum Updated: 08/02/22 2337    Blood Culture #1 **CANNOT BE ORDERED STAT** [[id number]]  (Normal) Collected: 07/24/22 2017    Order Status: Completed Specimen: Blood Updated: 07/29/22 2102     CULTURE, BLOOD ([facility]) No Growth at 5 days    Blood Culture #2 **CANNOT BE ORDERED STAT** [[id number]]  (Normal) Collected: 07/24/22 2017    Order Status: Completed Specimen: Blood Updated: 07/29/22 2102     CULTURE, BLOOD ([facility]) No  Growth at 5 days           Significant Imaging: I have reviewed all pertinent imaging results/findings within the past 24 hours.    Assessment/Plan:      She is a 77-year-old female with a past medical history of ILD on chronic CellCept and steroids who recently developed dyspnea on exertion as well as hypoxia.  Imaging concerning for atypical process.  Id consult for input.     1. Worsening bilateral pulmonary infiltrates, concerning for atypical infection such as PJP vs progression of ILD vs other  2. 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 Acute hypoxemic respiratory failure  4. Atrial fibrillation / history of DVT / factor V Leiden mutation on Coumadin  5. EWA    -After initial improvement, however slight it was, patient had some worsening today with desaturation and increased O2 requirements, she also reports having increased cough and sputum production   -Noted increased WBC which although could be related to steroids, would be concerned for new/superimposed infectious process  -CXR repeated today and after initial improvement on Monday, it appears to have some worsening consolidation on the R hilum area      PLAN:  -PJP PCR pending,   -Continue Primaquine and Clindamycin for concern for severe PJP, continue Steroids dosing as recommended by pulmonary   -Will also add Ceftriaxone while the repeat sputum culture is pending  -I had a long conversation with the patient and her son at bedside, unfortunately it appears that it is more likely we are dealing with progression of her disease which got us to the current situation rather than an active infection and it is likely that we will not improve much more given now she is about 10 days of appropriate therapy for PJP with no significant improvement  -Patient and family are contemplating placement and hospice at this stage which would be an appropriate next step to consider given advanced disease and poor  response to therapies attempted    ID will continue following. Please contact us with any questions or concerns.    Ronnie Sherman MD  Infectious Disease  [facility]

## 2022-08-03 NOTE — CONSULTS
"Consults   Patient Name: Ria Holman   MRN: [id number]   Admission Date: 7/24/2022   Hospital Length of Stay: 10   Attending Provider: Reinaldo Mckay MD   Consulting Provider: Richa GOVEA  Reason for Consult: Goals of Care  Primary Care Physician:  Reinaldo Mckay MD     Principal Problem: Signs and symptoms of severe respiratory distress       Final diagnoses:  [R06.02] Shortness of breath  [J84.9] ILD (interstitial lung disease)  [J96.01] Acute hypoxemic respiratory failure (Primary)  [R79.1] Supratherapeutic INR      Assessment/Plan:     I reviewed the patient and family's understanding of the seriousness of the illness and its expected prognosis. We discussed the patient's goals of care and treatment preferences.         Met with patient and family--spouse, daughter, son, DIL and granddaughter.  Extensive discussion about options.  Discussed LTAC--family concerned about visitation policy and being able to stay with patient.  Discussed rehab goals of LTAC.      Discussed hospice services at length.  Discussed however, that patient is receiving more oxygen support than can be delivered at home.  Discussed that if patient decides to go home with hospice, FIO2 would need to be weaned to 50%.  Discussed that we would start low dose oxygen at hospital for comfort.  Son asking about being able to get home on 50% FIO2.  Discussed that patient has been tolerating low saturations and it is possible that  she may tolerate better with assistance of morphine, but it is also possible that patient become somnolent and be too unstable to move.  Discussed that surprisingly enough, patient has continued to be awake and alert despite low saturations.      Patient verbalized that having her family close to her is important.  She states that she "doesn't want to beat a dead horse" and that if she is unlikely to recover then her time with family is more important to her.  Patient and family verbalized " understanding.  Offered extensive support and informed I would continue to follow.         Interval History:     Patient remains on vapotherm at 100% FIO2 and bipap at night.  Saturations have been in the 80's during today.  Pulmonology spoke with family and patient today concerning LTAC and hospice. Notified by staff nurse that family requesting to speak with palliative team.       Active Ambulatory Problems     Diagnosis Date Noted    Chronic cardiopulmonary disease     Pulmonary hypertension 2021    Interstitial lung disease 2021    Shortness of breath 2021    Atrial fibrillation, currently in sinus rhythm 2021    Primary hypertension 2021    Anemia 2022    Antisynthetase syndrome 2022    Atrial fibrillation 2022    Deep vein thrombosis (DVT) 2022    Factor V Leiden mutation 2022    Gastroesophageal reflux disease 2022    Hyperlipidemia 2022    Malignant neoplasm of skin 2022    Temporal arteritis 2022    Hypothyroidism 2022    Tremor 2022    Vitamin D deficiency 2022     Resolved Ambulatory Problems     Diagnosis Date Noted    No Resolved Ambulatory Problems     Past Medical History:   Diagnosis Date    Cancer     Deep vein thrombosis     Diabetes mellitus     GERD (gastroesophageal reflux disease)     Hypertension         Past Surgical History:   Procedure Laterality Date    APPENDECTOMY      BIOPSY OF ADENOIDS      BIOPSY OF TEMPORAL ARTERY       SECTION      FOOT SURGERY      FUNCTIONAL ENDOSCOPIC SINUS SURGERY (FESS)      JOINT REPLACEMENT      hip    RIGHT HEART CATHETERIZATION Right 2021    Procedure: INSERTION, CATHETER, RIGHT HEART;  Surgeon: Bridgette Cheung MD;  Location: [facility] CATH LAB;  Service: Cardiology;  Laterality: Right;    THORACOSCOPY      TONSILLECTOMY          Review of patient's allergies indicates:   Allergen Reactions    Codeine Itching     Doxycycline Itching and Rash    Levofloxacin Nausea Only     Other reaction(s): Acid reflux          Current Facility-Administered Medications:     acetaminophen tablet 650 mg, 650 mg, Oral, Q8H PRN, Reinaldo Mckay MD    acetaminophen tablet 650 mg, 650 mg, Oral, Q8H PRN, Reinaldo Mckay MD    ALPRAZolam tablet 0.25 mg, 0.25 mg, Oral, TID PRN, Jeanna Araiza, JEFERSON, 0.25 mg at 07/31/22 1318    aspirin chewable tablet 81 mg, 81 mg, Oral, Daily, Reinaldo Mckay MD, 81 mg at 08/03/22 0900    bisacodyL suppository 10 mg, 10 mg, Rectal, Once PRN, JEFERSON Murguia    clindamycin in D5W 600 mg/50 mL IVPB 600 mg, 600 mg, Intravenous, Q8H, JEFERSON Ramirez, Stopped at 08/03/22 0637    docusate calcium capsule 240 mg, 240 mg, Oral, Daily, Reinaldo Mckay MD, 240 mg at 08/01/22 0942    famotidine tablet 20 mg, 20 mg, Oral, BID, Reinaldo Mckay MD, 20 mg at 08/03/22 0900    flecainide tablet 100 mg, 100 mg, Oral, BID, Reinaldo Mckay MD, 100 mg at 08/03/22 0900    furosemide tablet 40 mg, 40 mg, Oral, Daily, Reinaldo Mckay MD, 40 mg at 08/03/22 0900    gabapentin capsule 300 mg, 300 mg, Oral, BID, Reinaldo Mckay MD, 300 mg at 08/03/22 0900    HYDROcodone-acetaminophen 5-325 mg per tablet 1 tablet, 1 tablet, Oral, Q4H PRN, Reinaldo Mckay MD, 1 tablet at 07/28/22 0737    hydrOXYzine pamoate capsule 25 mg, 25 mg, Oral, QHS, Reinaldo Mckay MD, 25 mg at 08/02/22 2137    Lactobacillus acidophilus capsule 1 capsule, 1 capsule, Oral, TID WM, Reinaldo Mckay MD, 1 capsule at 08/03/22 1157    levothyroxine tablet 50 mcg, 50 mcg, Oral, Before breakfast, Reinaldo Mckay MD, 50 mcg at 08/03/22 0607    losartan tablet 50 mg, 50 mg, Oral, Daily, Reinaldo Mckya MD, 50 mg at 08/03/22 0900    melatonin tablet 6 mg, 6 mg, Oral, Nightly PRN, Reinaldo Mckay MD    methylPREDNISolone sodium succinate injection 40 mg, 40 mg, Intravenous, Q8H,  Andi Franklin MD, 40 mg at 08/03/22 1039    metoprolol succinate (TOPROL-XL) 24 hr tablet 25 mg, 25 mg, Oral, Daily, Reinaldo Mckay MD, 25 mg at 08/03/22 0900    mycophenolate capsule 1,000 mg, 1,000 mg, Oral, BID, Reinaldo Mckay MD, 1,000 mg at 08/03/22 0900    nystatin 100,000 unit/mL suspension 500,000 Units, 500,000 Units, Mouth/Throat, QID (WM & HS), Reinaldo Mckay MD, 500,000 Units at 08/03/22 1041    ondansetron injection 4 mg, 4 mg, Intravenous, Q6H PRN, Reinaldo Mckay MD, 4 mg at 08/02/22 0805    oxymetazoline 0.05 % nasal spray 2 spray, 2 spray, Each Nostril, Daily, Reinaldo Mckay MD, 2 spray at 08/03/22 0607    pantoprazole EC tablet 40 mg, 40 mg, Oral, Daily, Reinaldo Mckay MD, 40 mg at 08/03/22 0900    polyethylene glycol packet 17 g, 17 g, Oral, Daily, Reinaldo Mckay MD, 17 g at 08/01/22 0938    pravastatin tablet 20 mg, 20 mg, Oral, QHS, Reinaldo Mckay MD, 20 mg at 08/02/22 2137    primaquine tablet 26.3 mg, 26.3 mg, Oral, Q24H, Sally Vargas, JEFERSON, 26.3 mg at 08/02/22 1753    psyllium husk (with sugar) 3.4 gram packet 1 packet, 1 packet, Oral, BID, Yifan Glaser II, MD, 1 packet at 08/03/22 0900    sodium chloride 0.9% flush 10 mL, 10 mL, Intravenous, PRN, Reinaldo Mckay MD    spironolactone tablet 25 mg, 25 mg, Oral, Daily, Reinaldo Mckay MD, 25 mg at 08/03/22 0900    vitamin D 1000 units tablet 5,000 Units, 5,000 Units, Oral, Daily, Reinaldo Mckay MD, 5,000 Units at 08/03/22 1157    warfarin (COUMADIN) tablet 2 mg, 2 mg, Oral, Daily, Reinaldo Mckay MD, 2 mg at 08/02/22 1753    zolpidem tablet 5 mg, 5 mg, Oral, Nightly, Reinaldo Mckay MD, 5 mg at 08/02/22 2100     acetaminophen, acetaminophen, ALPRAZolam, bisacodyL, HYDROcodone-acetaminophen, melatonin, ondansetron, sodium chloride 0.9%     Family History   Problem Relation Age of Onset    Diabetes Mellitus Mother     Heart failure Mother      "Hypertension Mother     Heart attack Father     Hypertension Father     Hypertension Sister           Review of Systems   Constitutional: Positive for activity change, appetite change and fatigue.   Respiratory: Positive for shortness of breath.             Objective:   BP (!) 156/62   Pulse 76   Temp 97.7 °F (36.5 °C) (Oral)   Resp 17   Ht 5' 3" (1.6 m)   Wt 83.9 kg (185 lb)   SpO2 (!) 85%   BMI 32.77 kg/m²      Physical Exam   Constitutional: She is oriented to person, place, and time. She appears ill.   Eyes: Pupils are equal, round, and reactive to light.   Cardiovascular: Normal rate and regular rhythm.   Pulmonary/Chest: She is in respiratory distress.   Abdominal: Soft.   Neurological: She is oriented to person, place, and time.   Skin: There is pallor.          Review of Symptoms  Review of Symptoms    Symptom Assessment (ESAS 0-10 Scale)  Pain:  0  Dyspnea:  0  Anxiety:  0  Nausea:  0  Depression:  0  Anorexia:  0  Fatigue:  0  Insomnia:  0  Restlessness:  0  Agitation:  0         Bowel Management Plan (BMP):  Yes      Performance Status:  30    Living Arrangements:  Lives with spouse      Advance Care Planning   Advance Directives:   Do Not Resuscitate Status: Yes      Decision Making:  Family answered questions and Patient answered questions          PAINAD: NA    Caregiver burden formerly assessed: yes      No results displayed because visit has over 200 results.               > 50% of 60 min of encounter was spent in chart review, face to face discussion of goals of care, symptom assessment, coordination of care and emotional support.    Richa Martin FNP, [facility]  Palliative Medicine  [facility] - Observation Unit  "

## 2022-08-03 NOTE — PT/OT/SLP PROGRESS
Physical Therapy      Patient Name:  Ria Holman   MRN:  [id number]    Discussed pt with NSG prior to entry, NSG ok'd PT to attempt but stated that pt was very fatigued and likely wouldn't be able to participate.    Attempted to see pt for PT at 0945, pt and family request PT return later today d/t low O2 sats at rest and pt very fatigued currently.    PT returned at 1310. Pt and family politely refused PT at this time d/t continued low O2 sats (87-88%) at rest on vapotherm and still feeling very fatigued.    Will reattempt to see pt for PT tomorrow.

## 2022-08-03 NOTE — PROGRESS NOTES
Subjective:  The patient had a fairly good night.  She did desaturate a couple times with activity.  Her day yesterday was well and was up in the chair will what 4 hours x2.  She is eating more.  Her bowels are doing okay.    Objective:  She is afebrile vital signs stable with mildly elevated blood pressure of 150/65.  Current O2 sat 91% she is on of a birth    General appearance is unremarkable.  She is in no distress.  Heart has a regular rate and rhythm.  Lungs clear anteriorly.  Abdomen nontender.  Extremities without clubbing cyanosis or edema.    Lab work reviewed.  [place] [address] of the numbers stable platelet count down to 110 INR is 2.6 a and chemistry profile is stable    Assessment:  1. Respiratory failure.  She is she is holding her own.  But she remains very tenuous and minimal activity causes her to desaturate she is using the BiPAP at night and the Vapotherm during the day    2. Hypertension.  Mildly elevated currently but overall okay    3. History of atrial fib.  She remains in sinus rhythm    4. Generalized debilitation secondary to acute illness    5. Recent Coumadin toxicity.  INR subtherapeutic on low-dose Coumadin    Plan:  Continue same meds.  Encourage increase nutrition.  Continue therapy.  Update lab work in the morning.

## 2022-08-04 PROBLEM — Z51.5 COMFORT MEASURES ONLY STATUS: Status: ACTIVE | Noted: 2022-01-01

## 2022-08-04 NOTE — PROGRESS NOTES
[facility] - 9th Floor Med Surg  Pulmonary Critical Care Note    Patient Name: Ria Holman  MRN: [id number]  Admission Date: 7/24/2022  Hospital Length of Stay: 11 days  Code Status: DNR  Attending Provider: Reinaldo Mckay MD  Primary Care Provider: Reinaldo Mckay MD     Subjective:     HPI:   This is a 77-year-old female patient of Dr. 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 She also has a history of DVT in her right lower extremity and factor V Leiden mutation as well as atrial fibrillation, on coumadin. Most recently had been on Cellcept and prednisone by Dr Branch. She had been experiencing increasing dyspnea and cough and was seen in our clinic last week. O2 levels were low and she was initiated on oxygen, started on bactrim and azithromyin and increased her prednisone. A CT scan was obtained at outside hospital which revealed no PE but showed patchy areas of groundglass increasing compared to April 2022 per report. She had low oxygen levels that persisted at home despite nasal cannula and she was admitted on 7/24/22. Workup revealed anemia, elevated INR, and hypoxemia. Her respiratory culture from last week is with normal respiratory katie and COVID and flu swabs are negative.     24 Hour Interval History:  I have extensively reviewed this patient's medical records and hospital stay, as being seen by me for the 1st time this a.m..  She is afebrile.  SaO2 85-97%, on 100% BiPAP.  No new culture growth, respiratory secretions from 08/02/2022 with many yeast.  Serum Fungitell 07/25/2022 noted negative, respiratory secretion PJP PCR is pending.  She is currently receiving IV ceftriaxone, IV clindamycin, PO primaquine, and Solu-Medrol 40 mg IV q.8 hours.  She is receiving full anticoagulation with Coumadin, current INR this a.m. 3.60.  She was reported as desaturating into the low 70s today on attempts at taking off BiPAP in placing back  on 100% Vapotherm at 40 L. She is now back on BiPAP.  She is now discussing transition to palliative care measures at this point with hospice.    Past Medical History:   Diagnosis Date    Atrial fibrillation     Cancer     skin cancer    Deep vein thrombosis     Diabetes mellitus     GERD (gastroesophageal reflux disease)     Hyperlipidemia     Hypertension     Hypothyroidism     Interstitial lung disease        Past Surgical History:   Procedure Laterality Date    APPENDECTOMY      BIOPSY OF ADENOIDS      BIOPSY OF TEMPORAL ARTERY       SECTION      FOOT SURGERY      FUNCTIONAL ENDOSCOPIC SINUS SURGERY (FESS)      JOINT REPLACEMENT      hip    RIGHT HEART CATHETERIZATION Right 2021    Procedure: INSERTION, CATHETER, RIGHT HEART;  Surgeon: Bridgette Cheung MD;  Location: [facility] CATH LAB;  Service: Cardiology;  Laterality: Right;    THORACOSCOPY      TONSILLECTOMY         Social History     Socioeconomic History    Marital status:    Tobacco Use    Smoking status: Never Smoker    Smokeless tobacco: Never Used   Substance and Sexual Activity    Alcohol use: Never    Drug use: Never     Objective:     Current Outpatient Medications   Medication Instructions    cholecalciferol, vitamin D3, 125 mcg (5,000 unit) Tab No dose, route, or frequency recorded.    DOCUSATE CALCIUM ORAL No dose, route, or frequency recorded.    flecainide (TAMBOCOR) 100 mg, Oral, 2 times daily    gabapentin (NEURONTIN) 300 MG capsule TAKE ONE CAPSULE TWICE DAILY    gabapentin 300 mg, Oral, Nightly    hydrOXYzine pamoate (VISTARIL) 25 mg, Oral, Nightly    levothyroxine (SYNTHROID) 50 MCG tablet TAKE ONE TABLET ONCE DAILY    loratadine (CLARITIN) 10 mg, Oral    losartan (COZAAR) 50 mg, Oral, Daily    metoprolol succinate (TOPROL-XL) 25 mg, Oral, Daily    mycophenolate (CELLCEPT) 500 mg Tab Oral, 2 times daily    omeprazole (PRILOSEC) 20 mg, Oral, Daily    pravastatin (PRAVACHOL) 20 mg, Oral,  Nightly    psyllium 0.52 g, Oral, 2 times daily    Saccharomyces boulardii (FLORASTOR) 250 mg, Oral, 2 times daily    spironolactone (ALDACTONE) 25 MG tablet TAKE ONE TABLET ONCE DAILY    zolpidem (AMBIEN) 5 MG Tab TAKE ONE TABLET AT BEDTIME       Current Inpatient Medications   aspirin  81 mg Oral Daily    cefTRIAXone (ROCEPHIN) IVPB  2 g Intravenous Q24H    clindamycin (CLEOCIN) IVPB  600 mg Intravenous Q8H    docusate calcium  240 mg Oral Daily    famotidine  20 mg Oral BID    flecainide  100 mg Oral BID    furosemide  40 mg Oral Daily    gabapentin  300 mg Oral BID    hydrOXYzine pamoate  25 mg Oral QHS    Lactobacillus acidophilus  1 capsule Oral TID WM    levothyroxine  50 mcg Oral Before breakfast    losartan  50 mg Oral Daily    methylPREDNISolone sodium succinate injection  40 mg Intravenous Q8H    metoprolol succinate  25 mg Oral Daily    mycophenolate  1,000 mg Oral BID    nystatin  500,000 Units Mouth/Throat QID (WM & HS)    pantoprazole  40 mg Oral Daily    polyethylene glycol  17 g Oral Daily    pravastatin  20 mg Oral QHS    primaquine  26.3 mg Oral Q24H    psyllium husk (with sugar)  1 packet Oral BID    spironolactone  25 mg Oral Daily    vitamin D  5,000 Units Oral Daily    warfarin  2 mg Oral Daily    zolpidem  5 mg Oral Nightly       Intake/Output Summary (Last 24 hours) at 8/4/2022 0830  Last data filed at 8/3/2022 1820  Gross per 24 hour   Intake --   Output 100 ml   Net -100 ml       Vital Signs (Most Recent):  Temp: 98.5 °F (36.9 °C) (08/04/22 0742)  Pulse: 67 (08/04/22 0742)  Resp: 18 (08/04/22 0400)  BP: 131/60 (08/04/22 0742)  SpO2: 97 % (08/04/22 0742)  Body mass index is 32.77 kg/m².  Weight: 83.9 kg (185 lb) Vital Signs (24h Range):  Temp:  [97.1 °F (36.2 °C)-98.6 °F (37 °C)] 98.5 °F (36.9 °C)  Pulse:  [66-82] 67  Resp:  [12-33] 18  SpO2:  [77 %-97 %] 97 %  BP: (117-156)/(52-67) 131/60     Physical Exam  Constitutional:       General: She is not in acute  distress.  HENT:      Head: Normocephalic and atraumatic.      Mouth/Throat:      Mouth: Mucous membranes are dry.      Pharynx: No oropharyngeal exudate or posterior oropharyngeal erythema.   Cardiovascular:      Rate and Rhythm: Normal rate and regular rhythm.      Pulses: Normal pulses.      Heart sounds: Normal heart sounds.   Pulmonary:      Breath sounds: Rales (Few posterior basilar dry rales bilateral) present. No wheezing or rhonchi.   Musculoskeletal:      Right lower leg: No edema.      Left lower leg: No edema.   Skin:     General: Skin is warm and dry.      Capillary Refill: Capillary refill takes less than 2 seconds.   Neurological:      Mental Status: She is alert.   Psychiatric:         Mood and Affect: Mood normal.         Behavior: Behavior normal.         Thought Content: Thought content normal.         Judgment: Judgment normal.         Mechanical ventilation support:  Oxygen Concentration (%): 100 (08/04/22 0424)    Lines/Drains/Airways     Drain  Duration                Urethral Catheter 07/25/22 1000 Straight-tip 16 Fr. 9 days          Peripheral Intravenous Line  Duration                Peripheral IV - Single Lumen 07/25/22 1000 Anterior;Right Upper Arm 9 days                Significant Labs:    Lab Results   Component Value Date    WBC 26.4 (H) 08/04/2022    HGB 9.7 (L) 08/04/2022    HCT 30.9 (L) 08/04/2022    .8 (H) 08/04/2022    PLT 99 (L) 08/04/2022     BMP  Lab Results   Component Value Date     (L) 08/04/2022    K 4.6 08/04/2022    CL 98 02/25/2021    CO2 32 (H) 08/04/2022    BUN 47.6 (H) 08/04/2022    CREATININE 0.79 08/04/2022    CALCIUM 8.7 08/04/2022    ANIONGAP 5 (L) 02/25/2021    ESTGFRAFRICA >60.0 02/25/2021    EGFRNONAA >60 08/04/2022       ABG  No results for input(s): PH, PO2, PCO2, HCO3, BE in the last 168 hours.        Significant Imaging:  Chest x-ray (08/03/2022, my reading):  There are extensive bilateral reticular infiltrates throughout all lung fields.   Costophrenic angles are sharp bilateral.        Assessment/Plan:     Assessment  1. Pulmonary fibrosis with antisynthetase syndrome, on chronic Cellcept and prednisone per rheumatology  2. Worsening bilateral infiltrates on imaging -- possible progression of ILD vs infectious vs inflammatory vs DAH.  Low suspicion for DAH at this point, infectious disease workup continues unremarkable.  Serum Fungitell negative, PJP PCR pending.  3. Acute hypoxemic respiratory failure s/t above  4. Anemia and elevated INR, improved  5. Atrial fibrillation on coumadin  6. Pulmonary HTN, mild by RHC  7. History of DVT and factor V Leiden mutation, on coumadin  8. WEA-improved.  Increased BUN could be secondary to steroids.  9. Hyponatremia-resolved    Plan  1. will continue current antibiotic regimen, infectious Disease service is closely following.  Lower suspicion for PJP with negative Fungitell, PJP PCR pending.  2. Will continue BiPAP alternating with high-flow Vapotherm nasal cannula as tolerated.  3. She is considering transitioning to hospice at this point with comfort measures only.  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  I have had a long discussion with patient and her family this a.m. concerning this.     Pam Fan MD, [facility]   Internal Medicine PGY-II    Pulmonary Critical Care Medicine  [facility] - 9th Floor Med Surg    Attending Addendum to Follow

## 2022-08-04 NOTE — CONSULTS
Consults     Patient Name: Ria Holman   MRN: [id number]   Admission Date: 7/24/2022   Hospital Length of Stay: 11   Attending Provider: Reinaldo Mckay MD   Consulting Provider: Richa GOVEA  Reason for Consult: Goals of Care  Primary Care Physician:  Reinaldo Mckay MD     Principal Problem: Signs and symptoms of severe respiratory distress       Final diagnoses:  [R06.02] Shortness of breath  [J84.9] ILD (interstitial lung disease)  [J96.01] Acute hypoxemic respiratory failure (Primary)  [R79.1] Supratherapeutic INR      Assessment/Plan:     I reviewed the patient and family's understanding of the seriousness of the illness and its expected prognosis. We discussed the patient's goals of care and treatment preferences.        Met with patient with  and daughter at bedside--daughter tearful and states that patient experienced desaturations necessitating increase in bipap setting which have made her uncomfortable.  Dr. Bañuelos made rounds and discussed not escalating bipap setting for comfort.  Patient informed that she is not always uncomfortable when she desaturates.     Discussed transitioning to comfort measures in hospital with cessation of labs, imaging and focus on patient's comfort alone.  Discussed that role of hospital team is to treat acute issues, unless comfort measures are instituted.  Patient reiterated that her goal is to return home with hospice.  Daughter asking that we meet about hospice services when her brother arrives at 2 PM.      Discussed plan with family and options of continued care in the hospital or transition to home with hospice services.  Discussed that patient is desaturating with maximum oxygen support and may or may not tolerate weaning to FIO2 of 50% which can be supported at home or inpatient hospice house.  Discussed that if patient's goal is to return home, she may not live very long with reduced oxygen support.  Discussed  However, that if patient  wants to go home, we will facilitate according to her wishes.  Discussed that she would be given comfort medications for any transfer.      Discussed that we could institute comfort measures in the hospital with discontinuation of labs and imaging.  Patient states that at this time, she would like to transition to comfort measures in the hospital and continue to consider plan.  States that she would like her medication regimen adjusted for maximum comfort and quality of life.  Discussed scheduled administration of morphine and PRN.  Patient asking if she can have ambien and xanax.  Discussed that she would benefit from PRN xanax and possibly could take ambien and xanax and hour apart.  Discussed that I would complete comfort orders.   Offered extensive support and informed I would continue to follow for assistance.      Updated staff and Dr. Mckay.     Recommendations:   Family and patient want morphine started in the hospital for transition for possible hospice services at home.      Comfort measures in place.       Interval History:     Patient saturations continue to decrease necessitating increase in bipap settings.  Pulmonology and ID continuing to follow.  Coumadin dc'd per PCP today.  Palliative care following for assistance with plan of care.         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    Atrial fibrillation 07/13/2022    Deep vein thrombosis (DVT) 07/13/2022    Factor V Leiden mutation 07/13/2022    Gastroesophageal reflux disease 07/13/2022    Hyperlipidemia 07/13/2022    Malignant neoplasm of skin 07/13/2022    Temporal arteritis 07/13/2022    Hypothyroidism 07/13/2022     Tremor 2022    Vitamin D deficiency 2022     Resolved Ambulatory Problems     Diagnosis Date Noted    No Resolved Ambulatory Problems     Past Medical History:   Diagnosis Date    Cancer     Deep vein thrombosis     Diabetes mellitus     GERD (gastroesophageal reflux disease)     Hypertension         Past Surgical History:   Procedure Laterality Date    APPENDECTOMY      BIOPSY OF ADENOIDS      BIOPSY OF TEMPORAL ARTERY       SECTION      FOOT SURGERY      FUNCTIONAL ENDOSCOPIC SINUS SURGERY (FESS)      JOINT REPLACEMENT      hip    RIGHT HEART CATHETERIZATION Right 2021    Procedure: INSERTION, CATHETER, RIGHT HEART;  Surgeon: Bridgette Cheung MD;  Location: [facility] CATH [facility];  Service: Cardiology;  Laterality: Right;    THORACOSCOPY      TONSILLECTOMY          Review of patient's allergies indicates:   Allergen Reactions    Codeine Itching    Doxycycline Itching and Rash    Levofloxacin Nausea Only     Other reaction(s): Acid reflux          Current Facility-Administered Medications:     acetaminophen tablet 650 mg, 650 mg, Oral, Q8H PRN, Reinaldo Mckay MD    acetaminophen tablet 650 mg, 650 mg, Oral, Q8H PRN, Reinaldo Mckay MD    ALPRAZolam tablet 0.25 mg, 0.25 mg, Oral, TID PRN, JEFERSON Pride, 0.25 mg at 22 1540    aspirin chewable tablet 81 mg, 81 mg, Oral, Daily, Reinaldo Mckay MD, 81 mg at 22 0900    bisacodyL suppository 10 mg, 10 mg, Rectal, Once PRN, JEFERSON Murguia    cefTRIAXone 2 gram/50 mL IVPB 2 g, 2 g, Intravenous, Q24H, Ronnie Sherman MD, Stopped at 22 1945    clindamycin in D5W 600 mg/50 mL IVPB 600 mg, 600 mg, Intravenous, Q8H, JEFERSON Ramirez, Stopped at 22 0638    docusate calcium capsule 240 mg, 240 mg, Oral, Daily, Reinaldo Mckay MD, 240 mg at 22 0942    famotidine tablet 20 mg, 20 mg, Oral, BID, Reinaldo Mckay MD, 20 mg at 221    flecainide tablet 100  mg, 100 mg, Oral, BID, Reinaldo Mckay MD, 100 mg at 08/03/22 2121    furosemide tablet 40 mg, 40 mg, Oral, Daily, Reinaldo Mckay MD, 40 mg at 08/03/22 0900    gabapentin capsule 300 mg, 300 mg, Oral, BID, Reinaldo Mckay MD, 300 mg at 08/03/22 2121    hydrOXYzine pamoate capsule 25 mg, 25 mg, Oral, QHS, Reinaldo Mckay MD, 25 mg at 08/03/22 2121    Lactobacillus acidophilus capsule 1 capsule, 1 capsule, Oral, TID WM, Reinaldo Mckay MD, 1 capsule at 08/03/22 1840    levothyroxine tablet 50 mcg, 50 mcg, Oral, Before breakfast, Reinaldo Mckay MD, 50 mcg at 08/04/22 0607    losartan tablet 50 mg, 50 mg, Oral, Daily, Reinaldo Mckay MD, 50 mg at 08/03/22 0900    melatonin tablet 6 mg, 6 mg, Oral, Nightly PRN, Reinaldo Mckay MD    methylPREDNISolone sodium succinate injection 40 mg, 40 mg, Intravenous, Q8H, Andi Franklin MD, 40 mg at 08/04/22 0135    metoprolol succinate (TOPROL-XL) 24 hr tablet 25 mg, 25 mg, Oral, Daily, Reinaldo Mckay MD, 25 mg at 08/03/22 0900    morphine 100 mg/5 mL (20 mg/mL) concentrated solution 5 mg, 5 mg, Oral, Q2H PRN, Reinaldo Mckay MD    morphine injection 2 mg, 2 mg, Intravenous, Q6H PRN, Richa Martin, JEFERSON    mycophenolate capsule 1,000 mg, 1,000 mg, Oral, BID, Reinaldo Mckay MD, 1,000 mg at 08/03/22 2120    nystatin 100,000 unit/mL suspension 500,000 Units, 500,000 Units, Mouth/Throat, QID (WM & HS), Reinaldo Mckay MD, 500,000 Units at 08/03/22 2100    ondansetron injection 4 mg, 4 mg, Intravenous, Q6H PRN, Reinaldo Mckay MD, 4 mg at 08/02/22 0805    pantoprazole EC tablet 40 mg, 40 mg, Oral, Daily, Reinaldo Mckay MD, 40 mg at 08/03/22 0900    polyethylene glycol packet 17 g, 17 g, Oral, Daily, Reinaldo Mckay MD, 17 g at 08/01/22 0938    pravastatin tablet 20 mg, 20 mg, Oral, QHS, Reinaldo Mckay MD, 20 mg at 08/03/22 2121    primaquine tablet 26.3 mg, 26.3 mg, Oral, Q24H,  "Sally ROSADO Alicia, FNP, 26.3 mg at 08/03/22 1840    psyllium husk (with sugar) 3.4 gram packet 1 packet, 1 packet, Oral, BID, Yifan Glaser II, MD, 1 packet at 08/03/22 2100    sodium chloride 0.9% flush 10 mL, 10 mL, Intravenous, PRN, Reinaldo Mckay MD    spironolactone tablet 25 mg, 25 mg, Oral, Daily, Reinaldo Mckay MD, 25 mg at 08/03/22 0900    vitamin D 1000 units tablet 5,000 Units, 5,000 Units, Oral, Daily, Reinaldo Mckay MD, 5,000 Units at 08/03/22 1157    zolpidem tablet 5 mg, 5 mg, Oral, Nightly, Reinaldo Mckay MD, 5 mg at 08/03/22 2100     acetaminophen, acetaminophen, ALPRAZolam, bisacodyL, melatonin, morphine, morphine, ondansetron, sodium chloride 0.9%     Family History   Problem Relation Age of Onset    Diabetes Mellitus Mother     Heart failure Mother     Hypertension Mother     Heart attack Father     Hypertension Father     Hypertension Sister           Review of Systems   Constitutional: Positive for activity change, appetite change and fatigue.   Respiratory: Positive for cough and shortness of breath.    Skin: Positive for color change.            Objective:   /60   Pulse 68   Temp 98.5 °F (36.9 °C) (Axillary)   Resp (!) 24   Ht 5' 3" (1.6 m)   Wt 83.9 kg (185 lb)   SpO2 97%   BMI 32.77 kg/m²      Physical Exam   Constitutional: She is oriented to person, place, and time. She appears ill.   Eyes: Pupils are equal, round, and reactive to light.   Cardiovascular: Normal rate and regular rhythm.   Pulmonary/Chest: Breath sounds normal.   Abdominal: Soft. Bowel sounds are normal.   Musculoskeletal:      Comments: sarcopenia   Neurological: She is oriented to person, place, and time.   Skin: There is pallor.   Psychiatric: Mood normal.          Review of Symptoms  Review of Symptoms    Symptom Assessment (ESAS 0-10 Scale)  Pain:  0  Dyspnea:  0  Anxiety:  0  Nausea:  0  Depression:  0  Anorexia:  0  Fatigue:  0  Insomnia:  0  Restlessness:  " 0  Agitation:  0         Bowel Management Plan (BMP):  Yes      Performance Status:  30      Advance Care Planning   Advance Directives:   Do Not Resuscitate Status: Yes      Decision Making:  Patient answered questions and Family answered questions          PAINAD: NA    Caregiver burden formerly assessed: yes      No results displayed because visit has over 200 results.               > 50% of 75 min of encounter was spent in chart review, face to face discussion of goals of care, symptom assessment, coordination of care and emotional support.    Richa OCONNORP, [facility]  Palliative Medicine  [facility] - Observ[facility]

## 2022-08-04 NOTE — PROGRESS NOTES
Infectious Disease  Progress Note    Patient Name: Ria Holman   MRN: [id number]   Admission Date: 7/24/2022   Hospital Length of Stay: 11 days  Attending Physician: Reinaldo Mckay MD   Primary Care Provider: Reinaldo Mckay MD     Isolation Status: No active isolations       Subjective:     Principal Problem: Signs and symptoms of severe respiratory distress     Interval History:   Appears she is leaning towards comfort measures and hospice care.  Reports feeling slightly improved today, she received a dose of morphine and reports that this helped her significantly.    Review of Systems   Review of Systems   All other systems reviewed and are negative.       Objective:     Vital Signs (Most Recent):  Temp: 98 °F (36.7 °C) (08/04/22 1517)  Pulse: 68 (08/04/22 0842)  Resp: (!) 22 (08/04/22 1517)  BP: (!) 157/64 (08/04/22 1517)  SpO2: 97 % (08/04/22 0742)  Vital Signs (24h Range):  Temp:  [97.1 °F (36.2 °C)-98.6 °F (37 °C)] 98 °F (36.7 °C)  Pulse:  [66-78] 68  Resp:  [16-33] 22  SpO2:  [77 %-97 %] 97 %  BP: (117-157)/(52-67) 157/64      Weight:   Wt Readings from Last 1 Encounters:   07/25/22 83.9 kg (185 lb)      Body mass index is Body mass index is 32.77 kg/m².     Estimated Creatinine Clearance: Estimated Creatinine Clearance: 63.1 mL/min (based on SCr of 0.79 mg/dL).     Lines/Drains/Airways     Drain  Duration                Urethral Catheter 07/25/22 1000 Straight-tip 16 Fr. 10 days          Peripheral Intravenous Line  Duration                Peripheral IV - Single Lumen 07/25/22 1000 Anterior;Right Upper Arm 10 days                 Physical Exam  Physical Exam  Constitutional:       Appearance: She is ill-appearing.      Comments: On HHFNC with conversational dyspnea   HENT:      Head: Normocephalic and atraumatic.      Mouth/Throat:      Pharynx: No oropharyngeal exudate or posterior oropharyngeal erythema.   Eyes:      Extraocular Movements: Extraocular movements intact.      Pupils: Pupils are  equal, round, and reactive to light.   Cardiovascular:      Rate and Rhythm: Normal rate and regular rhythm.      Heart sounds: No murmur heard.  Pulmonary:      Effort: No respiratory distress.      Breath sounds: Rhonchi and rales present. No wheezing.   Abdominal:      General: Bowel sounds are normal. There is no distension.      Palpations: Abdomen is soft.      Tenderness: There is no abdominal tenderness. There is no right CVA tenderness or left CVA tenderness.   Musculoskeletal:         General: No swelling or tenderness.      Cervical back: Neck supple. No rigidity or tenderness.   Lymphadenopathy:      Cervical: No cervical adenopathy.   Skin:     Findings: No lesion or rash.   Neurological:      General: No focal deficit present.      Mental Status: She is alert and oriented to person, place, and time. Mental status is at baseline.      Cranial Nerves: No cranial nerve deficit.      Motor: Weakness present.   Psychiatric:         Mood and Affect: Mood normal.         Behavior: Behavior normal.          Significant Labs:   CBC:   Recent Labs   Lab 08/03/22 0414 08/04/22  0517   WBC 26.9* 26.4*   HGB 10.0* 9.7*   HCT 32.5* 30.9*   * 99*     CMP:   Recent Labs   Lab 08/03/22 0414 08/04/22  0517   * 135*   K 4.5 4.6   CO2 32* 32*   BUN 47.5* 47.6*   CREATININE 0.81 0.79   CALCIUM 8.7 8.7   EGFRNONAA >60 >60       Microbiology Results (last 7 days)     Procedure Component Value Units Date/Time    Respiratory Culture [[id number]]  (Abnormal) Collected: 08/02/22 2330    Order Status: Completed Specimen: Sputum Updated: 08/04/22 0717     Respiratory Culture Many Yeast     Comment: with normal respiratory katie        GRAM STAIN Quality 1+       Many Yeast      Many Gram positive cocci      Many Gram Positive Rods    Blood Culture #1 **CANNOT BE ORDERED STAT** [[id number]]  (Normal) Collected: 07/24/22 2017    Order Status: Completed Specimen: Blood Updated: 07/29/22 2102     CULTURE, BLOOD ([facility]) No  Growth at 5 days    Blood Culture #2 **CANNOT BE ORDERED STAT** [[id number]]  (Normal) Collected: 07/24/22 2017    Order Status: Completed Specimen: Blood Updated: 07/29/22 2102     CULTURE, BLOOD ([facility]) No Growth at 5 days           Significant Imaging: I have reviewed all pertinent imaging results/findings within the past 24 hours.    Assessment/Plan:      She is a 77-year-old female with a past medical history of ILD on chronic CellCept and steroids who recently developed dyspnea on exertion as well as hypoxia.  Imaging concerning for atypical process.  Id consult for input.     1. Worsening bilateral pulmonary infiltrates, concerning for atypical infection such as PJP vs progression of ILD vs other  2. 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 Acute hypoxemic respiratory failure  4. Atrial fibrillation / history of DVT / factor V Leiden mutation on Coumadin  5. EWA    -restarted ceftriaxone 08/03/2022 given worsening chest x-ray and leukocytosis  -patient has opted for comfort measures and palliative care, she was started on low-dose morphine and appears to be much more comfortable my evaluation today  -she seems to be willing to continue with antibiotics at this time     PLAN:  -PJP PCR pending,   -Continue Primaquine and Clindamycin for concern for severe PJP, continue Steroids dosing as recommended by pulmonary   -continue ceftriaxone 2 g IV Q 24 hours as long as this is in line with patient's goals of care  -I discussed the case with the patient and her son at bedside, including appropriateness of decision to move into Comfort Measures goals of care given the highly likely scenario that her current condition is driven by her underlying disease progressing  -would be completely appropriate to discontinue her antibiotics should this be in line with the patient's goals moving forward  -will follow-up with her and her family tomorrow and continue this  conversation    ID will continue following. Please contact us with any questions or concerns.    Ronnie Sherman MD  Infectious Disease  [facility]

## 2022-08-04 NOTE — PROGRESS NOTES
Subjective:  The patient at that night.  Her shortness of breath has worsened.  She has not tolerated the Vapotherm but has acceptable O2 sats on 100% via BiPAP.  She had discussion with palliative care doctors and her pulmonologist yesterday.  The use of morphine was discussed.  She is ready to start it now.  We discussed frankly her very poor prognosis    Objective:  She is afebrile.  Vital signs stable.  She is somewhat anxious but in no distress.  Heart has a regular rate and rhythm.  lungs mild rhonchi bilaterally.  Abdomen nontender.  Extremities without clubbing cyanosis or edema.  She does have a significant weakness in both lower extremities with the right leg worse than the left.      Laboratory studies include a [place] [address] INR is 3.6 electrolytes okay.  Azotemia bit worse.      Assessment:  1. Respiratory failure.  Worsening.  Likely progression of underlying disease    2. Increasing weakness lower extremities.  Suspect polyneuropathy of critical illness    3. History of atrial fib    4. Generalized debilitation     Plan:  Add oral morphine p.r.n. shortness of breath.  Will discontinue Coumadin.  Will decrease frequency of blood draws

## 2022-08-04 NOTE — PT/OT/SLP PROGRESS
Physical Therapy      Patient Name:  Ria Holman   MRN:  [id number]    Spoke to pt and family. Confirmed that at this time they have elected from palliative/comfort care and do not wish for therapy services. Informed they to notify MD/RN if their decision changes and PT can be re-consulted. PT to sign off.

## 2022-08-04 NOTE — PLAN OF CARE
Problem: Adult Inpatient Plan of Care  Goal: Plan of Care Review  Outcome: Ongoing, Progressing  Goal: Patient-Specific Goal (Individualized)  Outcome: Ongoing, Progressing  Goal: Absence of Hospital-Acquired Illness or Injury  Outcome: Ongoing, Progressing  Goal: Optimal Comfort and Wellbeing  Outcome: Ongoing, Progressing  Goal: Readiness for Transition of Care  Outcome: Ongoing, Progressing     Problem: Infection  Goal: Absence of Infection Signs and Symptoms  Outcome: Ongoing, Progressing     Problem: Skin Injury Risk Increased  Goal: Skin Health and Integrity  Outcome: Ongoing, Progressing     Problem: Coping Ineffective  Goal: Effective Coping  Outcome: Ongoing, Progressing     Problem: Fall Injury Risk  Goal: Absence of Fall and Fall-Related Injury  Outcome: Ongoing, Progressing     Problem: Impaired Wound Healing  Goal: Optimal Wound Healing  Outcome: Ongoing, Progressing

## 2022-08-05 ENCOUNTER — TELEPHONE (OUTPATIENT)
Dept: HEPATOLOGY | Facility: HOSPITAL | Age: 77
End: 2022-08-05
Payer: MEDICARE

## 2022-08-05 LAB — MAYO GENERIC ORDERABLE RESULT: NORMAL

## 2022-08-05 NOTE — PROVIDER PROGRESS NOTES - EMERGENCY DEPT.
Encounter Date: 2022    ED Physician Progress Notes        Physician Note:   I was called by Dr. Painting who requested that I pronounce patient.  On exam, patient has no respirations, no heartbeat audible, is pulseless.  Negative corneal reflex.  Patient was pronounced  on 2022 at 10:40 p.m.

## 2022-08-05 NOTE — DISCHARGE SUMMARY
This is a death summary on the patient.  She  early this morning.  I last saw her yesterday morning on rounds and was informed of her demise early this morning.  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  Workup was unrevealing except for worsening pulmonary infiltrates and worsening hypoxemia.  Etiology of her worsening was unclear.  She was seen by numerous specialists including Pulmonary Medicine, Cardiology, Infectious Disease, and later palliative medicine.  She was treated for possible infection with broad-spectrum antibiotics.  She was also treated for possible atypical infection such as Pneumocystis carinii pneumonia.  She was also given increasing doses of steroids.  Unfortunately her clinical course took an overall downward spiral.  She had already decided on no code status.  Despite our best efforts she got worse and eventually was transitioned to comfort care.  She  early morning hours of .    The cause of death was respiratory failure secondary to progressive interstitial lung disease.  This lung disease was result of the anti synthetase syndrome.  Other medical problems included hypertension, atrial fibrillation status post ablation, history of DVT and factor 5 laden mutation, and corrected hypothyroidism.    The date of death as noted above was not early morning hours of , it was in fact late night hours of .

## 2022-08-05 NOTE — DISCHARGE SUMMARY
This note has been moved to another encounter. If you have any questions, please contact HIM Chart Correction at [phone].

## 2022-08-06 LAB
BACTERIA SPEC CULT: ABNORMAL
BACTERIA SPEC CULT: ABNORMAL
GRAM STN SPEC: ABNORMAL

## 2022-09-13 ENCOUNTER — TELEPHONE (OUTPATIENT)
Dept: INTERNAL MEDICINE | Facility: CLINIC | Age: 77
End: 2022-09-13
Payer: MEDICARE
